# Patient Record
Sex: FEMALE | Race: WHITE | Employment: OTHER | URBAN - METROPOLITAN AREA
[De-identification: names, ages, dates, MRNs, and addresses within clinical notes are randomized per-mention and may not be internally consistent; named-entity substitution may affect disease eponyms.]

---

## 2020-02-24 NOTE — PROGRESS NOTES
Called pt very [place] asked me to call her daughter, Jenna Jett [phone]  To answer medical info. Left message for Patient to call pat department for interview.

## 2020-02-25 ENCOUNTER — ANESTHESIA EVENT (OUTPATIENT)
Dept: ENDOSCOPY | Age: 80
End: 2020-02-25
Payer: MEDICARE

## 2020-02-25 RX ORDER — OXYCODONE HYDROCHLORIDE 30 MG/1
30 TABLET ORAL EVERY 4 HOURS PRN
COMMUNITY

## 2020-02-25 RX ORDER — CLOTRIMAZOLE 1 %
CREAM (GRAM) TOPICAL 2 TIMES DAILY
COMMUNITY

## 2020-02-25 RX ORDER — FLUOXETINE HYDROCHLORIDE 20 MG/1
20 CAPSULE ORAL DAILY
COMMUNITY

## 2020-02-25 RX ORDER — ALPRAZOLAM 0.25 MG/1
0.25 TABLET ORAL 2 TIMES DAILY PRN
COMMUNITY

## 2020-02-25 RX ORDER — ALBUTEROL SULFATE 90 UG/1
2 AEROSOL, METERED RESPIRATORY (INHALATION) EVERY 4 HOURS PRN
COMMUNITY

## 2020-02-25 RX ORDER — FLUTICASONE PROPIONATE 50 MCG
2 SPRAY, SUSPENSION (ML) NASAL DAILY
COMMUNITY

## 2020-02-25 SDOH — HEALTH STABILITY: MENTAL HEALTH: HOW OFTEN DO YOU HAVE A DRINK CONTAINING ALCOHOL?: NEVER

## 2020-02-25 NOTE — PROGRESS NOTES
[address]  time_1115 per Rashid rasmussen___________        Surgery time____1245________    Take the following medications with a sip of water: Follow your MD/Surgeons pre-procedure instructions regarding your medications    Do not eat or drink anything after 12:00 midnight prior to your surgery. This includes water chewing gum, mints and ice chips. You may brush your teeth and gargle the morning of your surgery, but do not swallow the water     Please see your family doctor/pediatrician for a history and physical and/or concerning medications. Bring any test results/reports from your physicians office. If you are under the care of a heart doctor or specialist doctor, please be aware that you may be asked to them for clearance    You may be asked to stop blood thinners such as Coumadin, Plavix, Fragmin, Lovenox, etc., or any anti-inflammatories such as:  Aspirin, Ibuprofen, Advil, Naproxen prior to your surgery. We also ask that you stop any OTC medications such as fish oil, vitamin E, glucosamine, garlic, Multivitamins, COQ 10, etc.    We ask that you do not smoke 24 hours prior to surgery  We ask that you do not  drink any alcoholic beverages 24 hours prior to surgery     You must make arrangements for a responsible adult to take you home after your surgery. For your safety you will not be allowed to leave alone or drive yourself home. Your surgery will be cancelled if you do not have a ride home. Also for your safety, it is strongly suggested that someone stay with you the first 24 hours after your surgery. A parent or legal guardian must accompany a child scheduled for surgery and plan to stay at the hospital until the child is discharged. Please do not bring other children with you. For your comfort, please wear simple loose fitting clothing to the hospital.  Please do not bring valuables.     Do not wear any make-up or nail polish

## 2020-02-25 NOTE — PROGRESS NOTES
Phone message left with Yadira Zamorano as requested per patient as noted from yesterday, to call PAT dept at [phone]  for history review and surgery instructions.

## 2020-02-25 NOTE — PROGRESS NOTES
C-Difficile admission screening and protocol:     * Admitted with diarrhea? no     *Prior history of C-Diff. In last 3 months? no     *Antibiotic use in the past 6-8 weeks? no     *Prior hospitalization or nursing home in the last month?  no

## 2020-02-26 ENCOUNTER — HOSPITAL ENCOUNTER (OUTPATIENT)
Age: 80
Setting detail: OUTPATIENT SURGERY
Discharge: HOME | End: 2020-02-26
Attending: INTERNAL MEDICINE | Admitting: INTERNAL MEDICINE
Payer: MEDICARE

## 2020-02-26 ENCOUNTER — ANESTHESIA (OUTPATIENT)
Dept: ENDOSCOPY | Age: 80
End: 2020-02-26
Payer: MEDICARE

## 2020-02-26 VITALS
SYSTOLIC BLOOD PRESSURE: 126 MMHG | BODY MASS INDEX: 21.35 KG/M2 | OXYGEN SATURATION: 98 % | HEIGHT: 62 IN | TEMPERATURE: 97.1 F | RESPIRATION RATE: 14 BRPM | DIASTOLIC BLOOD PRESSURE: 74 MMHG | WEIGHT: 116 LBS | HEART RATE: 62 BPM

## 2020-02-26 VITALS
OXYGEN SATURATION: 99 % | SYSTOLIC BLOOD PRESSURE: 165 MMHG | RESPIRATION RATE: 24 BRPM | DIASTOLIC BLOOD PRESSURE: 74 MMHG

## 2020-02-26 LAB
GLUCOSE BLD-MCNC: 89 MG/DL (ref 70–99)
PERFORMED ON: NORMAL

## 2020-02-26 PROCEDURE — 2580000003 HC RX 258: Performed by: ANESTHESIOLOGY

## 2020-02-26 PROCEDURE — 7100000010 HC PHASE II RECOVERY - FIRST 15 MIN: Performed by: INTERNAL MEDICINE

## 2020-02-26 PROCEDURE — 2500000003 HC RX 250 WO HCPCS: Performed by: NURSE ANESTHETIST, CERTIFIED REGISTERED

## 2020-02-26 PROCEDURE — 3700000001 HC ADD 15 MINUTES (ANESTHESIA): Performed by: INTERNAL MEDICINE

## 2020-02-26 PROCEDURE — 3700000000 HC ANESTHESIA ATTENDED CARE: Performed by: INTERNAL MEDICINE

## 2020-02-26 PROCEDURE — 3609012400 HC EGD TRANSORAL BIOPSY SINGLE/MULTIPLE: Performed by: INTERNAL MEDICINE

## 2020-02-26 PROCEDURE — 7100000000 HC PACU RECOVERY - FIRST 15 MIN: Performed by: INTERNAL MEDICINE

## 2020-02-26 PROCEDURE — 3609018500 HC EGD US SCOPE W/ADJACENT STRUCTURES: Performed by: INTERNAL MEDICINE

## 2020-02-26 PROCEDURE — 2709999900 HC NON-CHARGEABLE SUPPLY: Performed by: INTERNAL MEDICINE

## 2020-02-26 PROCEDURE — 7100000011 HC PHASE II RECOVERY - ADDTL 15 MIN: Performed by: INTERNAL MEDICINE

## 2020-02-26 PROCEDURE — 88305 TISSUE EXAM BY PATHOLOGIST: CPT

## 2020-02-26 PROCEDURE — 7100000001 HC PACU RECOVERY - ADDTL 15 MIN: Performed by: INTERNAL MEDICINE

## 2020-02-26 PROCEDURE — 6360000002 HC RX W HCPCS: Performed by: NURSE ANESTHETIST, CERTIFIED REGISTERED

## 2020-02-26 RX ORDER — PROPOFOL 10 MG/ML
INJECTION, EMULSION INTRAVENOUS PRN
Status: DISCONTINUED | OUTPATIENT
Start: 2020-02-26 | End: 2020-02-26 | Stop reason: SDUPTHER

## 2020-02-26 RX ORDER — LIDOCAINE HYDROCHLORIDE 20 MG/ML
INJECTION, SOLUTION EPIDURAL; INFILTRATION; INTRACAUDAL; PERINEURAL PRN
Status: DISCONTINUED | OUTPATIENT
Start: 2020-02-26 | End: 2020-02-26 | Stop reason: SDUPTHER

## 2020-02-26 RX ORDER — SODIUM CHLORIDE 0.9 % (FLUSH) 0.9 %
10 SYRINGE (ML) INJECTION PRN
Status: DISCONTINUED | OUTPATIENT
Start: 2020-02-26 | End: 2020-02-26 | Stop reason: HOSPADM

## 2020-02-26 RX ORDER — SODIUM CHLORIDE 9 MG/ML
INJECTION, SOLUTION INTRAVENOUS CONTINUOUS
Status: DISCONTINUED | OUTPATIENT
Start: 2020-02-26 | End: 2020-02-26 | Stop reason: HOSPADM

## 2020-02-26 RX ORDER — SODIUM CHLORIDE 0.9 % (FLUSH) 0.9 %
10 SYRINGE (ML) INJECTION EVERY 12 HOURS SCHEDULED
Status: DISCONTINUED | OUTPATIENT
Start: 2020-02-26 | End: 2020-02-26 | Stop reason: HOSPADM

## 2020-02-26 RX ORDER — PROPOFOL 10 MG/ML
INJECTION, EMULSION INTRAVENOUS CONTINUOUS PRN
Status: DISCONTINUED | OUTPATIENT
Start: 2020-02-26 | End: 2020-02-26 | Stop reason: SDUPTHER

## 2020-02-26 RX ADMIN — PROPOFOL 70 MG: 10 INJECTION, EMULSION INTRAVENOUS at 12:39

## 2020-02-26 RX ADMIN — PROPOFOL 140 MCG/KG/MIN: 10 INJECTION, EMULSION INTRAVENOUS at 12:39

## 2020-02-26 RX ADMIN — LIDOCAINE HYDROCHLORIDE 80 MG: 20 INJECTION, SOLUTION EPIDURAL; INFILTRATION; INTRACAUDAL; PERINEURAL at 12:39

## 2020-02-26 RX ADMIN — SODIUM CHLORIDE: 9 INJECTION, SOLUTION INTRAVENOUS at 12:24

## 2020-02-26 ASSESSMENT — PAIN - FUNCTIONAL ASSESSMENT: PAIN_FUNCTIONAL_ASSESSMENT: 0-10

## 2020-02-26 ASSESSMENT — PULMONARY FUNCTION TESTS
PIF_VALUE: 0

## 2020-02-26 ASSESSMENT — PAIN SCALES - GENERAL
PAINLEVEL_OUTOF10: 0

## 2020-02-26 NOTE — OP NOTE
Endosonographically, the major papilla appeared normal  Pancreas: The pancreatic duct measured 1.7mm off the major papilla, 2.2mm in the head of the pancreas, 3.0mm in the body of the pancreas, and 1.1mm in the tail of the pancreas. The pancreatic parenchyma was normal without mass lesion or chronic pancreatitis. Bile duct: The bile duct was 5.3mm off the major papilla and 11.3mm in the mid bile duct. There were no shadowing stones or strictures. Gallbladder: The gallbladder was surgically absent. L adrenal: Normal.  Doppler evaluation of the celiac artery, splenic artery, hepatic artery, superior mesenteric artery, superior mesenteric vein, portal vein, and splenic vein was normal.    The duodenum and stomach were decompressed and the scope was withdrawn from the patient. The patient tolerated the procedure well and was taken to the post anesthesia care unit in good condition. Doppler Interpretation:  Doppler evaluation was performed and interpreted on the spot by the endoscopist without the presence of a radiologist.      Specimens taken: yes  Estimated blood loss: minimal      Impression:  1. 3cm sliding hiatal hernia  2. Otherwise normal EGD. Small bowel biopsies were obtained. 3. Mild biliary and pancreatic ductal dilation likely from extrinsic compression from the duodenal diverticulum. No stricture, stones, mass lesions identified. No chronic pancreatitis. Recommendations:  1. Clear liquid diet advance as tolerated. 2.  Office follow-up with Dr. Diamante Alberto and Dr. Wang Butcher  3. I took small bowel biopsies today to evaluate for the cause of weight loss. If these are negative, I did not find a cause today.     [place]

## 2020-02-26 NOTE — ANESTHESIA PRE PROCEDURE
tablet Take 30 mg by mouth every 4 hours as needed for Pain.  albuterol sulfate  (90 Base) MCG/ACT inhaler Inhale 2 puffs into the lungs every 4 hours as needed for Wheezing      ALPRAZolam (XANAX) 0.25 MG tablet Take 0.25 mg by mouth 2 times daily as needed for Sleep (to start March 4 , 2020). Current Facility-Administered Medications   Medication Dose Route Frequency Provider Last Rate Last Dose    0.9 % sodium chloride infusion   Intravenous Continuous Sushila Camarena MD        sodium chloride flush 0.9 % injection 10 mL  10 mL Intravenous 2 times per day Sushila Camarena MD        sodium chloride flush 0.9 % injection 10 mL  10 mL Intravenous PRN Sushila Camarena MD         Vital Signs (Current)   Vitals:    02/25/20 1534   Weight: 116 lb (52.6 kg)   Height: 5' 2\" (1.575 m)                                          BP Readings from Last 3 Encounters:   11/01/12 129/62     Vital Signs Statistics (for past 48 hrs)     No data recorded  BP Readings from Last 3 Encounters:   11/01/12 129/62       BMI  Body mass index is 21.22 kg/m². Estimated body mass index is 21.22 kg/m² as calculated from the following:    Height as of this encounter: 5' 2\" (1.575 m). Weight as of this encounter: 116 lb (52.6 kg). CBC No results found for: WBC, RBC, HGB, HCT, MCV, RDW, PLT  CMP  No results found for: NA, K, CL, CO2, BUN, CREATININE, GFRAA, AGRATIO, LABGLOM, GLUCOSE, PROT, CALCIUM, BILITOT, ALKPHOS, AST, ALT  BMP  No results found for: NA, K, CL, CO2, BUN, CREATININE, CALCIUM, GFRAA, LABGLOM, GLUCOSE  POCGlucose  No results for input(s): GLUCOSE in the last 72 hours.    Coags  No results found for: PROTIME, INR, APTT  HCG (If Applicable) No results found for: PREGTESTUR, PREGSERUM, HCG, HCGQUANT   ABGs No results found for: PHART, PO2ART, SUO7SXH, UGX1ACY, BEART, X3OKDTDE   Type & Screen (If Applicable)  No results found for: LABABO, LABRH                         BMI: Wt Readings from Last 3 Encounters: NPO Status:  >8h                          Anesthesia Evaluation  Patient summary reviewed no history of anesthetic complications:   Airway: Mallampati: II  TM distance: >3 FB   Neck ROM: full  Mouth opening: > = 3 FB Dental: normal exam         Pulmonary: breath sounds clear to auscultation  (+) COPD:      (-) asthma and sleep apnea                           Cardiovascular:  Exercise tolerance: good (>4 METS),   (+) hypertension:, hyperlipidemia    (-) past MI, CABG/stent and  angina      Rhythm: regular  Rate: normal                    Neuro/Psych:      (-) seizures, TIA and CVA           GI/Hepatic/Renal:        (-) GERD, liver disease, no renal disease and no morbid obesity       Endo/Other:        (-) hypothyroidism               Abdominal:           Vascular:     - DVT and PE. Anesthesia Plan      MAC     ASA 3       Induction: intravenous. Anesthetic plan and risks discussed with patient. Plan discussed with CRNA. This pre-anesthesia assessment may be used as a history and physical.    DOS STAFF ADDENDUM:    Pt seen and examined, chart reviewed (including anesthesia, drug and allergy history). No interval changes to history and physical examination. Anesthetic plan, risks, benefits, alternatives, and personnel involved discussed with patient. Patient verbalized an understanding and agrees to proceed.       Veronica Lubin MD  February 26, 2020  11:05 AM

## 2020-02-26 NOTE — H&P
status:      Spouse name: Not on file    Number of children: Not on file    Years of education: Not on file    Highest education level: Not on file   Occupational History    Not on file   Social Needs    Financial resource strain: Not on file    Food insecurity:     Worry: Not on file     Inability: Not on file    Transportation needs:     Medical: Not on file     Non-medical: Not on file   Tobacco Use    Smoking status: Current Every Day Smoker     Years: 30.00     Types: Cigarettes    Smokeless tobacco: Never Used    Tobacco comment: 5 cigarettes daily   Substance and Sexual Activity    Alcohol use: Never     Frequency: Never    Drug use: Never    Sexual activity: Not Currently   Lifestyle    Physical activity:     Days per week: Not on file     Minutes per session: Not on file    Stress: Not on file   Relationships    Social connections:     Talks on phone: Not on file     Gets together: Not on file     Attends Sabianism service: Not on file     Active member of club or organization: Not on file     Attends meetings of clubs or organizations: Not on file     Relationship status: Not on file    Intimate partner violence:     Fear of current or ex partner: Not on file     Emotionally abused: Not on file     Physically abused: Not on file     Forced sexual activity: Not on file   Other Topics Concern    Not on file   Social History Narrative    Not on file      Family History:       Problem Relation Age of Onset    Heart Disease Father         PHYSICAL EXAM:      /62   Pulse 72   Temp 98 °F (36.7 °C) (Temporal)   Resp 16   Ht 5' 2\" (1.575 m)   Wt 116 lb (52.6 kg)   SpO2 98%   BMI 21.22 kg/m²  I        Heart:  RRR    Lungs:  CTA b    Abdomen:  S/NT/ND/+BS      ASSESSMENT AND PLAN:  ASA: per anesthesia  Mallampati: per anesthesia  1. Patient is a 78 y.o. female here for EUS and EGD   2. Procedure options, risks and benefits reviewed with the patient.   The patient expresses

## 2024-01-28 ENCOUNTER — APPOINTMENT (OUTPATIENT)
Dept: GENERAL RADIOLOGY | Age: 84
DRG: 871 | End: 2024-01-28
Payer: MEDICARE

## 2024-01-28 ENCOUNTER — APPOINTMENT (OUTPATIENT)
Dept: CT IMAGING | Age: 84
DRG: 871 | End: 2024-01-28
Payer: MEDICARE

## 2024-01-28 ENCOUNTER — HOSPITAL ENCOUNTER (INPATIENT)
Age: 84
LOS: 11 days | Discharge: NURSING, REHAB OR LONG-TERM CARE | DRG: 871 | End: 2024-02-08
Attending: EMERGENCY MEDICINE | Admitting: INTERNAL MEDICINE
Payer: MEDICARE

## 2024-01-28 DIAGNOSIS — A41.9 SEPTICEMIA (HCC): Primary | ICD-10-CM

## 2024-01-28 DIAGNOSIS — J18.9 PNEUMONIA OF BOTH LOWER LOBES DUE TO INFECTIOUS ORGANISM: ICD-10-CM

## 2024-01-28 DIAGNOSIS — J10.1 INFLUENZA A: ICD-10-CM

## 2024-01-28 DIAGNOSIS — I48.91 ATRIAL FIBRILLATION WITH RVR (HCC): ICD-10-CM

## 2024-01-28 DIAGNOSIS — N17.9 ACUTE KIDNEY INJURY (HCC): ICD-10-CM

## 2024-01-28 DIAGNOSIS — J96.01 ACUTE HYPOXIC RESPIRATORY FAILURE (HCC): ICD-10-CM

## 2024-01-28 PROBLEM — R65.20 SEVERE SEPSIS (HCC): Status: ACTIVE | Noted: 2024-01-28

## 2024-01-28 LAB
ABO + RH BLD: NORMAL
ALBUMIN SERPL-MCNC: 2.3 G/DL (ref 3.4–5)
ALBUMIN SERPL-MCNC: 3.2 G/DL (ref 3.4–5)
ALBUMIN/GLOB SERPL: 0.8 {RATIO} (ref 1.1–2.2)
ALP SERPL-CCNC: 36 U/L (ref 40–129)
ALP SERPL-CCNC: 48 U/L (ref 40–129)
ALT SERPL-CCNC: 30 U/L (ref 10–40)
ALT SERPL-CCNC: 36 U/L (ref 10–40)
ANION GAP SERPL CALCULATED.3IONS-SCNC: 16 MMOL/L (ref 3–16)
ANION GAP SERPL CALCULATED.3IONS-SCNC: 20 MMOL/L (ref 3–16)
ANTI-XA UNFRAC HEPARIN: <0.1 IU/ML (ref 0.3–0.7)
APTT BLD: 29.2 SEC (ref 22.7–35.9)
APTT BLD: 39.7 SEC (ref 22.7–35.9)
AST SERPL-CCNC: 52 U/L (ref 15–37)
AST SERPL-CCNC: 54 U/L (ref 15–37)
BACTERIA URNS QL MICRO: ABNORMAL /HPF
BASE EXCESS BLDV CALC-SCNC: 1.7 MMOL/L (ref -2–3)
BASOPHILS # BLD: 0 K/UL (ref 0–0.2)
BASOPHILS NFR BLD: 0 %
BILIRUB DIRECT SERPL-MCNC: 0.6 MG/DL (ref 0–0.3)
BILIRUB DIRECT SERPL-MCNC: 0.6 MG/DL (ref 0–0.3)
BILIRUB INDIRECT SERPL-MCNC: 0.3 MG/DL (ref 0–1)
BILIRUB INDIRECT SERPL-MCNC: 0.4 MG/DL (ref 0–1)
BILIRUB SERPL-MCNC: 0.9 MG/DL (ref 0–1)
BILIRUB SERPL-MCNC: 1 MG/DL (ref 0–1)
BILIRUB UR QL STRIP.AUTO: ABNORMAL
BLD GP AB SCN SERPL QL: NORMAL
BUN SERPL-MCNC: 87 MG/DL (ref 7–20)
BUN SERPL-MCNC: 95 MG/DL (ref 7–20)
CALCIUM SERPL-MCNC: 10.3 MG/DL (ref 8.3–10.6)
CALCIUM SERPL-MCNC: 9.3 MG/DL (ref 8.3–10.6)
CHLORIDE SERPL-SCNC: 100 MMOL/L (ref 99–110)
CHLORIDE SERPL-SCNC: 104 MMOL/L (ref 99–110)
CLARITY UR: CLEAR
CO2 BLDV-SCNC: 29 MMOL/L
CO2 SERPL-SCNC: 21 MMOL/L (ref 21–32)
CO2 SERPL-SCNC: 23 MMOL/L (ref 21–32)
COHGB MFR BLDV: 1.1 % (ref 0–1.5)
COLOR UR: YELLOW
CREAT SERPL-MCNC: 1.4 MG/DL (ref 0.6–1.2)
CREAT SERPL-MCNC: 2 MG/DL (ref 0.6–1.2)
DEPRECATED RDW RBC AUTO: 13.6 % (ref 12.4–15.4)
DEPRECATED RDW RBC AUTO: 13.9 % (ref 12.4–15.4)
DO-HGB MFR BLDV: 74.9 %
EKG DIAGNOSIS: NORMAL
EKG Q-T INTERVAL: 238 MS
EKG QRS DURATION: 70 MS
EKG QTC CALCULATION (BAZETT): 406 MS
EKG R AXIS: 29 DEGREES
EKG T AXIS: 213 DEGREES
EKG VENTRICULAR RATE: 175 BPM
EOSINOPHIL # BLD: 0 K/UL (ref 0–0.6)
EOSINOPHIL NFR BLD: 0 %
FINE GRAN CASTS #/AREA URNS HPF: ABNORMAL /LPF (ref 0–2)
FLUAV RNA RESP QL NAA+PROBE: DETECTED
FLUBV RNA RESP QL NAA+PROBE: NOT DETECTED
GFR SERPLBLD CREATININE-BSD FMLA CKD-EPI: 24 ML/MIN/{1.73_M2}
GFR SERPLBLD CREATININE-BSD FMLA CKD-EPI: 37 ML/MIN/{1.73_M2}
GLUCOSE SERPL-MCNC: 102 MG/DL (ref 70–99)
GLUCOSE SERPL-MCNC: 119 MG/DL (ref 70–99)
GLUCOSE UR STRIP.AUTO-MCNC: NEGATIVE MG/DL
HCO3 BLDV-SCNC: 27.5 MMOL/L (ref 24–28)
HCT VFR BLD AUTO: 38.6 % (ref 36–48)
HCT VFR BLD AUTO: 41.9 % (ref 36–48)
HCT VFR BLD AUTO: 49.9 % (ref 36–48)
HEMOCCULT STL QL: ABNORMAL
HGB BLD-MCNC: 13.5 G/DL (ref 12–16)
HGB BLD-MCNC: 13.9 G/DL (ref 12–16)
HGB BLD-MCNC: 16.8 G/DL (ref 12–16)
HGB UR QL STRIP.AUTO: ABNORMAL
HYALINE CASTS #/AREA URNS LPF: ABNORMAL /LPF (ref 0–2)
INR PPP: 1.04 (ref 0.84–1.16)
INR PPP: 1.22 (ref 0.84–1.16)
KETONES UR STRIP.AUTO-MCNC: NEGATIVE MG/DL
LACTATE BLDV-SCNC: 12.7 MMOL/L (ref 0.4–1.9)
LACTATE BLDV-SCNC: 2.2 MMOL/L (ref 0.4–2)
LACTATE BLDV-SCNC: 2.4 MMOL/L (ref 0.4–2)
LACTATE BLDV-SCNC: 2.7 MMOL/L (ref 0.4–2)
LACTATE BLDV-SCNC: 3.1 MMOL/L (ref 0.4–2)
LACTATE BLDV-SCNC: 3.4 MMOL/L (ref 0.4–1.9)
LEUKOCYTE ESTERASE UR QL STRIP.AUTO: NEGATIVE
LYMPHOCYTES # BLD: 0.7 K/UL (ref 1–5.1)
LYMPHOCYTES NFR BLD: 3 %
MAGNESIUM SERPL-MCNC: 2.4 MG/DL (ref 1.8–2.4)
MCH RBC QN AUTO: 31.7 PG (ref 26–34)
MCH RBC QN AUTO: 31.9 PG (ref 26–34)
MCHC RBC AUTO-ENTMCNC: 33.1 G/DL (ref 31–36)
MCHC RBC AUTO-ENTMCNC: 33.8 G/DL (ref 31–36)
MCV RBC AUTO: 94.5 FL (ref 80–100)
MCV RBC AUTO: 95.7 FL (ref 80–100)
METAMYELOCYTES NFR BLD MANUAL: 2 %
METHGB MFR BLDV: 0.1 % (ref 0–1.5)
MONOCYTES # BLD: 0.3 K/UL (ref 0–1.3)
MONOCYTES NFR BLD: 3 %
NEUTROPHILS # BLD: 8.6 K/UL (ref 1.7–7.7)
NEUTROPHILS NFR BLD: 83 %
NEUTS BAND NFR BLD MANUAL: 5 % (ref 0–7)
NITRITE UR QL STRIP.AUTO: NEGATIVE
PATH INTERP BLD-IMP: NO
PCO2 BLDV: 45.8 MMHG (ref 41–51)
PH BLDV: 7.39 [PH] (ref 7.35–7.45)
PH UR STRIP.AUTO: 5.5 [PH] (ref 5–8)
PHOSPHATE SERPL-MCNC: 2.9 MG/DL (ref 2.5–4.9)
PLATELET # BLD AUTO: 113 K/UL (ref 135–450)
PLATELET # BLD AUTO: 175 K/UL (ref 135–450)
PLATELET BLD QL SMEAR: ADEQUATE
PMV BLD AUTO: 9.4 FL (ref 5–10.5)
PMV BLD AUTO: 9.7 FL (ref 5–10.5)
PO2 BLDV: <30 MMHG (ref 25–40)
POTASSIUM SERPL-SCNC: 3.3 MMOL/L (ref 3.5–5.1)
POTASSIUM SERPL-SCNC: 3.6 MMOL/L (ref 3.5–5.1)
PROCALCITONIN SERPL IA-MCNC: 8.64 NG/ML (ref 0–0.15)
PROT SERPL-MCNC: 6 G/DL (ref 6.4–8.2)
PROT SERPL-MCNC: 7.4 G/DL (ref 6.4–8.2)
PROT UR STRIP.AUTO-MCNC: 100 MG/DL
PROTHROMBIN TIME: 13.6 SEC (ref 11.5–14.8)
PROTHROMBIN TIME: 15.4 SEC (ref 11.5–14.8)
RBC # BLD AUTO: 4.38 M/UL (ref 4–5.2)
RBC # BLD AUTO: 5.27 M/UL (ref 4–5.2)
RBC #/AREA URNS HPF: ABNORMAL /HPF (ref 0–4)
RBC MORPH BLD: NORMAL
REASON FOR REJECTION: NORMAL
REJECTED TEST: NORMAL
SAO2 % BLDV: 24 %
SARS-COV-2 RNA RESP QL NAA+PROBE: NOT DETECTED
SLIDE REVIEW: ABNORMAL
SODIUM SERPL-SCNC: 141 MMOL/L (ref 136–145)
SODIUM SERPL-SCNC: 143 MMOL/L (ref 136–145)
SP GR UR STRIP.AUTO: >=1.03 (ref 1–1.03)
T4 FREE SERPL-MCNC: 1.2 NG/DL (ref 0.9–1.8)
TROPONIN, HIGH SENSITIVITY: 31 NG/L (ref 0–14)
TROPONIN, HIGH SENSITIVITY: 47 NG/L (ref 0–14)
TROPONIN, HIGH SENSITIVITY: 52 NG/L (ref 0–14)
TROPONIN, HIGH SENSITIVITY: 70 NG/L (ref 0–14)
TSH SERPL DL<=0.005 MIU/L-ACNC: 0.26 UIU/ML (ref 0.27–4.2)
UA COMPLETE W REFLEX CULTURE PNL UR: ABNORMAL
UA DIPSTICK W REFLEX MICRO PNL UR: YES
URN SPEC COLLECT METH UR: ABNORMAL
UROBILINOGEN UR STRIP-ACNC: 0.2 E.U./DL
VARIANT LYMPHS NFR BLD MANUAL: 4 % (ref 0–6)
WBC # BLD AUTO: 7.1 K/UL (ref 4–11)
WBC # BLD AUTO: 9.6 K/UL (ref 4–11)
WBC #/AREA URNS HPF: ABNORMAL /HPF (ref 0–5)
YEAST URNS QL MICRO: PRESENT /HPF

## 2024-01-28 PROCEDURE — 36415 COLL VENOUS BLD VENIPUNCTURE: CPT

## 2024-01-28 PROCEDURE — 71045 X-RAY EXAM CHEST 1 VIEW: CPT

## 2024-01-28 PROCEDURE — 6360000002 HC RX W HCPCS

## 2024-01-28 PROCEDURE — 82270 OCCULT BLOOD FECES: CPT

## 2024-01-28 PROCEDURE — 94761 N-INVAS EAR/PLS OXIMETRY MLT: CPT

## 2024-01-28 PROCEDURE — 2700000000 HC OXYGEN THERAPY PER DAY

## 2024-01-28 PROCEDURE — 96367 TX/PROPH/DG ADDL SEQ IV INF: CPT

## 2024-01-28 PROCEDURE — 2580000003 HC RX 258

## 2024-01-28 PROCEDURE — 84439 ASSAY OF FREE THYROXINE: CPT

## 2024-01-28 PROCEDURE — 96366 THER/PROPH/DIAG IV INF ADDON: CPT

## 2024-01-28 PROCEDURE — 85730 THROMBOPLASTIN TIME PARTIAL: CPT

## 2024-01-28 PROCEDURE — 83605 ASSAY OF LACTIC ACID: CPT

## 2024-01-28 PROCEDURE — 85520 HEPARIN ASSAY: CPT

## 2024-01-28 PROCEDURE — 6360000002 HC RX W HCPCS: Performed by: EMERGENCY MEDICINE

## 2024-01-28 PROCEDURE — 2500000003 HC RX 250 WO HCPCS

## 2024-01-28 PROCEDURE — 87150 DNA/RNA AMPLIFIED PROBE: CPT

## 2024-01-28 PROCEDURE — 84145 PROCALCITONIN (PCT): CPT

## 2024-01-28 PROCEDURE — 99285 EMERGENCY DEPT VISIT HI MDM: CPT

## 2024-01-28 PROCEDURE — 85018 HEMOGLOBIN: CPT

## 2024-01-28 PROCEDURE — 93005 ELECTROCARDIOGRAM TRACING: CPT

## 2024-01-28 PROCEDURE — 96365 THER/PROPH/DIAG IV INF INIT: CPT

## 2024-01-28 PROCEDURE — 6360000004 HC RX CONTRAST MEDICATION

## 2024-01-28 PROCEDURE — 85610 PROTHROMBIN TIME: CPT

## 2024-01-28 PROCEDURE — 87040 BLOOD CULTURE FOR BACTERIA: CPT

## 2024-01-28 PROCEDURE — 84443 ASSAY THYROID STIM HORMONE: CPT

## 2024-01-28 PROCEDURE — 86850 RBC ANTIBODY SCREEN: CPT

## 2024-01-28 PROCEDURE — 87449 NOS EACH ORGANISM AG IA: CPT

## 2024-01-28 PROCEDURE — 6360000002 HC RX W HCPCS: Performed by: INTERNAL MEDICINE

## 2024-01-28 PROCEDURE — 2580000003 HC RX 258: Performed by: EMERGENCY MEDICINE

## 2024-01-28 PROCEDURE — 85025 COMPLETE CBC W/AUTO DIFF WBC: CPT

## 2024-01-28 PROCEDURE — 81001 URINALYSIS AUTO W/SCOPE: CPT

## 2024-01-28 PROCEDURE — 87636 SARSCOV2 & INF A&B AMP PRB: CPT

## 2024-01-28 PROCEDURE — 85014 HEMATOCRIT: CPT

## 2024-01-28 PROCEDURE — 80053 COMPREHEN METABOLIC PANEL: CPT

## 2024-01-28 PROCEDURE — 99291 CRITICAL CARE FIRST HOUR: CPT | Performed by: INTERNAL MEDICINE

## 2024-01-28 PROCEDURE — 86901 BLOOD TYPING SEROLOGIC RH(D): CPT

## 2024-01-28 PROCEDURE — 83735 ASSAY OF MAGNESIUM: CPT

## 2024-01-28 PROCEDURE — 84484 ASSAY OF TROPONIN QUANT: CPT

## 2024-01-28 PROCEDURE — 6370000000 HC RX 637 (ALT 250 FOR IP)

## 2024-01-28 PROCEDURE — 93005 ELECTROCARDIOGRAM TRACING: CPT | Performed by: INTERNAL MEDICINE

## 2024-01-28 PROCEDURE — 74178 CT ABD&PLV WO CNTR FLWD CNTR: CPT

## 2024-01-28 PROCEDURE — 71260 CT THORAX DX C+: CPT

## 2024-01-28 PROCEDURE — 82803 BLOOD GASES ANY COMBINATION: CPT

## 2024-01-28 PROCEDURE — 86900 BLOOD TYPING SEROLOGIC ABO: CPT

## 2024-01-28 PROCEDURE — 2060000000 HC ICU INTERMEDIATE R&B

## 2024-01-28 PROCEDURE — C9113 INJ PANTOPRAZOLE SODIUM, VIA: HCPCS

## 2024-01-28 PROCEDURE — 85027 COMPLETE CBC AUTOMATED: CPT

## 2024-01-28 PROCEDURE — 70450 CT HEAD/BRAIN W/O DYE: CPT

## 2024-01-28 RX ORDER — ACETAMINOPHEN 650 MG/1
650 SUPPOSITORY RECTAL ONCE
Status: COMPLETED | OUTPATIENT
Start: 2024-01-28 | End: 2024-01-28

## 2024-01-28 RX ORDER — HEPARIN SODIUM 1000 [USP'U]/ML
30 INJECTION, SOLUTION INTRAVENOUS; SUBCUTANEOUS PRN
Status: DISCONTINUED | OUTPATIENT
Start: 2024-01-28 | End: 2024-02-02 | Stop reason: ALTCHOICE

## 2024-01-28 RX ORDER — SODIUM CHLORIDE, SODIUM LACTATE, POTASSIUM CHLORIDE, AND CALCIUM CHLORIDE .6; .31; .03; .02 G/100ML; G/100ML; G/100ML; G/100ML
500 INJECTION, SOLUTION INTRAVENOUS ONCE
Status: COMPLETED | OUTPATIENT
Start: 2024-01-28 | End: 2024-01-28

## 2024-01-28 RX ORDER — LEVALBUTEROL INHALATION SOLUTION 0.63 MG/3ML
0.63 SOLUTION RESPIRATORY (INHALATION) EVERY 8 HOURS PRN
Status: DISCONTINUED | OUTPATIENT
Start: 2024-01-28 | End: 2024-01-28

## 2024-01-28 RX ORDER — HEPARIN SODIUM 10000 [USP'U]/100ML
5-35 INJECTION, SOLUTION INTRAVENOUS CONTINUOUS
Status: DISCONTINUED | OUTPATIENT
Start: 2024-01-28 | End: 2024-02-02

## 2024-01-28 RX ORDER — POLYETHYLENE GLYCOL 3350 17 G/17G
17 POWDER, FOR SOLUTION ORAL DAILY PRN
Status: DISCONTINUED | OUTPATIENT
Start: 2024-01-28 | End: 2024-02-08 | Stop reason: HOSPADM

## 2024-01-28 RX ORDER — HEPARIN SODIUM 1000 [USP'U]/ML
60 INJECTION, SOLUTION INTRAVENOUS; SUBCUTANEOUS PRN
Status: DISCONTINUED | OUTPATIENT
Start: 2024-01-28 | End: 2024-02-02 | Stop reason: ALTCHOICE

## 2024-01-28 RX ORDER — POTASSIUM CHLORIDE 7.45 MG/ML
10 INJECTION INTRAVENOUS
Status: DISPENSED | OUTPATIENT
Start: 2024-01-28 | End: 2024-01-28

## 2024-01-28 RX ORDER — SODIUM CHLORIDE 0.9 % (FLUSH) 0.9 %
5-40 SYRINGE (ML) INJECTION EVERY 12 HOURS SCHEDULED
Status: DISCONTINUED | OUTPATIENT
Start: 2024-01-28 | End: 2024-02-08 | Stop reason: HOSPADM

## 2024-01-28 RX ORDER — BUDESONIDE 0.25 MG/2ML
0.25 INHALANT ORAL
Status: DISCONTINUED | OUTPATIENT
Start: 2024-01-28 | End: 2024-02-08 | Stop reason: HOSPADM

## 2024-01-28 RX ORDER — IPRATROPIUM BROMIDE AND ALBUTEROL SULFATE 2.5; .5 MG/3ML; MG/3ML
1 SOLUTION RESPIRATORY (INHALATION)
Status: DISCONTINUED | OUTPATIENT
Start: 2024-01-28 | End: 2024-01-28

## 2024-01-28 RX ORDER — ALPRAZOLAM 0.25 MG/1
0.25 TABLET ORAL 2 TIMES DAILY PRN
Status: DISCONTINUED | OUTPATIENT
Start: 2024-01-28 | End: 2024-02-08 | Stop reason: HOSPADM

## 2024-01-28 RX ORDER — ONDANSETRON 4 MG/1
4 TABLET, ORALLY DISINTEGRATING ORAL EVERY 8 HOURS PRN
Status: DISCONTINUED | OUTPATIENT
Start: 2024-01-28 | End: 2024-02-08 | Stop reason: HOSPADM

## 2024-01-28 RX ORDER — ACETAMINOPHEN 500 MG
1000 TABLET ORAL ONCE
Status: DISCONTINUED | OUTPATIENT
Start: 2024-01-28 | End: 2024-01-28

## 2024-01-28 RX ORDER — DIGOXIN 0.25 MG/ML
500 INJECTION INTRAMUSCULAR; INTRAVENOUS ONCE
Status: COMPLETED | OUTPATIENT
Start: 2024-01-28 | End: 2024-01-28

## 2024-01-28 RX ORDER — SODIUM CHLORIDE 0.9 % (FLUSH) 0.9 %
5-40 SYRINGE (ML) INJECTION PRN
Status: DISCONTINUED | OUTPATIENT
Start: 2024-01-28 | End: 2024-02-08 | Stop reason: HOSPADM

## 2024-01-28 RX ORDER — ACETAMINOPHEN 325 MG/1
650 TABLET ORAL EVERY 6 HOURS PRN
Status: DISCONTINUED | OUTPATIENT
Start: 2024-01-28 | End: 2024-02-08 | Stop reason: HOSPADM

## 2024-01-28 RX ORDER — SODIUM CHLORIDE, SODIUM LACTATE, POTASSIUM CHLORIDE, AND CALCIUM CHLORIDE .6; .31; .03; .02 G/100ML; G/100ML; G/100ML; G/100ML
30 INJECTION, SOLUTION INTRAVENOUS ONCE
Status: COMPLETED | OUTPATIENT
Start: 2024-01-28 | End: 2024-01-28

## 2024-01-28 RX ORDER — HEPARIN SODIUM 5000 [USP'U]/ML
5000 INJECTION, SOLUTION INTRAVENOUS; SUBCUTANEOUS 2 TIMES DAILY
Status: DISCONTINUED | OUTPATIENT
Start: 2024-01-28 | End: 2024-01-28 | Stop reason: SDUPTHER

## 2024-01-28 RX ORDER — HEPARIN SODIUM 1000 [USP'U]/ML
60 INJECTION, SOLUTION INTRAVENOUS; SUBCUTANEOUS ONCE
Status: COMPLETED | OUTPATIENT
Start: 2024-01-28 | End: 2024-01-28

## 2024-01-28 RX ORDER — LEVALBUTEROL 1.25 MG/.5ML
1.25 SOLUTION, CONCENTRATE RESPIRATORY (INHALATION)
Status: DISCONTINUED | OUTPATIENT
Start: 2024-01-28 | End: 2024-01-28

## 2024-01-28 RX ORDER — DILTIAZEM HYDROCHLORIDE 5 MG/ML
10 INJECTION INTRAVENOUS ONCE
Status: DISCONTINUED | OUTPATIENT
Start: 2024-01-28 | End: 2024-01-28

## 2024-01-28 RX ORDER — HYDROXYZINE HYDROCHLORIDE 50 MG/ML
25 INJECTION, SOLUTION INTRAMUSCULAR
Status: COMPLETED | OUTPATIENT
Start: 2024-01-28 | End: 2024-01-28

## 2024-01-28 RX ORDER — SODIUM CHLORIDE, SODIUM LACTATE, POTASSIUM CHLORIDE, AND CALCIUM CHLORIDE .6; .31; .03; .02 G/100ML; G/100ML; G/100ML; G/100ML
1000 INJECTION, SOLUTION INTRAVENOUS ONCE
Status: COMPLETED | OUTPATIENT
Start: 2024-01-28 | End: 2024-01-28

## 2024-01-28 RX ORDER — LEVALBUTEROL INHALATION SOLUTION 0.63 MG/3ML
0.63 SOLUTION RESPIRATORY (INHALATION)
Status: DISCONTINUED | OUTPATIENT
Start: 2024-01-28 | End: 2024-02-03

## 2024-01-28 RX ORDER — SODIUM CHLORIDE, SODIUM LACTATE, POTASSIUM CHLORIDE, CALCIUM CHLORIDE 600; 310; 30; 20 MG/100ML; MG/100ML; MG/100ML; MG/100ML
INJECTION, SOLUTION INTRAVENOUS CONTINUOUS
Status: ACTIVE | OUTPATIENT
Start: 2024-01-28 | End: 2024-01-28

## 2024-01-28 RX ORDER — SODIUM CHLORIDE 9 MG/ML
INJECTION, SOLUTION INTRAVENOUS PRN
Status: DISCONTINUED | OUTPATIENT
Start: 2024-01-28 | End: 2024-02-08 | Stop reason: HOSPADM

## 2024-01-28 RX ORDER — METOPROLOL TARTRATE 1 MG/ML
5 INJECTION, SOLUTION INTRAVENOUS EVERY 6 HOURS PRN
Status: DISCONTINUED | OUTPATIENT
Start: 2024-01-28 | End: 2024-01-29

## 2024-01-28 RX ORDER — HYDROXYZINE HYDROCHLORIDE 50 MG/ML
25 INJECTION, SOLUTION INTRAMUSCULAR ONCE
Status: DISCONTINUED | OUTPATIENT
Start: 2024-01-28 | End: 2024-01-28

## 2024-01-28 RX ORDER — POTASSIUM CHLORIDE 7.45 MG/ML
10 INJECTION INTRAVENOUS
Status: COMPLETED | OUTPATIENT
Start: 2024-01-28 | End: 2024-01-28

## 2024-01-28 RX ORDER — ACETAMINOPHEN 650 MG/1
SUPPOSITORY RECTAL
Status: COMPLETED
Start: 2024-01-28 | End: 2024-01-28

## 2024-01-28 RX ORDER — ACETAMINOPHEN 650 MG/1
650 SUPPOSITORY RECTAL EVERY 6 HOURS PRN
Status: DISCONTINUED | OUTPATIENT
Start: 2024-01-28 | End: 2024-02-08 | Stop reason: HOSPADM

## 2024-01-28 RX ORDER — ONDANSETRON 2 MG/ML
4 INJECTION INTRAMUSCULAR; INTRAVENOUS EVERY 6 HOURS PRN
Status: DISCONTINUED | OUTPATIENT
Start: 2024-01-28 | End: 2024-02-08 | Stop reason: HOSPADM

## 2024-01-28 RX ADMIN — MEROPENEM 1000 MG: 1 INJECTION INTRAVENOUS at 08:42

## 2024-01-28 RX ADMIN — DILTIAZEM HYDROCHLORIDE 12.5 MG/HR: 5 INJECTION, SOLUTION INTRAVENOUS at 10:03

## 2024-01-28 RX ADMIN — HEPARIN SODIUM 12 UNITS/KG/HR: 10000 INJECTION, SOLUTION INTRAVENOUS at 10:57

## 2024-01-28 RX ADMIN — VANCOMYCIN HYDROCHLORIDE 1000 MG: 1 INJECTION, POWDER, LYOPHILIZED, FOR SOLUTION INTRAVENOUS at 03:21

## 2024-01-28 RX ADMIN — DILTIAZEM HYDROCHLORIDE 5 MG/HR: 5 INJECTION, SOLUTION INTRAVENOUS at 03:23

## 2024-01-28 RX ADMIN — SODIUM CHLORIDE, POTASSIUM CHLORIDE, SODIUM LACTATE AND CALCIUM CHLORIDE 500 ML: 600; 310; 30; 20 INJECTION, SOLUTION INTRAVENOUS at 09:47

## 2024-01-28 RX ADMIN — SODIUM CHLORIDE, SODIUM LACTATE, POTASSIUM CHLORIDE, AND CALCIUM CHLORIDE 1320 ML: .6; .31; .03; .02 INJECTION, SOLUTION INTRAVENOUS at 03:03

## 2024-01-28 RX ADMIN — DILTIAZEM HYDROCHLORIDE 10 MG/HR: 5 INJECTION, SOLUTION INTRAVENOUS at 10:13

## 2024-01-28 RX ADMIN — POTASSIUM CHLORIDE 10 MEQ: 10 INJECTION, SOLUTION INTRAVENOUS at 17:09

## 2024-01-28 RX ADMIN — POTASSIUM CHLORIDE 10 MEQ: 10 INJECTION, SOLUTION INTRAVENOUS at 10:10

## 2024-01-28 RX ADMIN — HYDROXYZINE HYDROCHLORIDE 25 MG: 50 INJECTION, SOLUTION INTRAMUSCULAR at 15:52

## 2024-01-28 RX ADMIN — SODIUM CHLORIDE, PRESERVATIVE FREE 10 ML: 5 INJECTION INTRAVENOUS at 21:47

## 2024-01-28 RX ADMIN — HEPARIN SODIUM 5000 UNITS: 5000 INJECTION INTRAVENOUS; SUBCUTANEOUS at 08:44

## 2024-01-28 RX ADMIN — DIGOXIN 500 MCG: 0.25 INJECTION INTRAMUSCULAR; INTRAVENOUS at 08:03

## 2024-01-28 RX ADMIN — HEPARIN SODIUM 12 UNITS/KG/HR: 10000 INJECTION, SOLUTION INTRAVENOUS at 17:07

## 2024-01-28 RX ADMIN — DILTIAZEM HYDROCHLORIDE 5 MG/HR: 5 INJECTION, SOLUTION INTRAVENOUS at 10:38

## 2024-01-28 RX ADMIN — POTASSIUM CHLORIDE 10 MEQ: 10 INJECTION, SOLUTION INTRAVENOUS at 18:29

## 2024-01-28 RX ADMIN — ACETAMINOPHEN 650 MG: 650 SUPPOSITORY RECTAL at 03:02

## 2024-01-28 RX ADMIN — SODIUM CHLORIDE, SODIUM LACTATE, POTASSIUM CHLORIDE, AND CALCIUM CHLORIDE 1000 ML: .6; .31; .03; .02 INJECTION, SOLUTION INTRAVENOUS at 05:51

## 2024-01-28 RX ADMIN — SODIUM CHLORIDE, POTASSIUM CHLORIDE, SODIUM LACTATE AND CALCIUM CHLORIDE: 600; 310; 30; 20 INJECTION, SOLUTION INTRAVENOUS at 08:27

## 2024-01-28 RX ADMIN — AMIODARONE HYDROCHLORIDE 1 MG/MIN: 50 INJECTION, SOLUTION INTRAVENOUS at 11:05

## 2024-01-28 RX ADMIN — SODIUM CHLORIDE, PRESERVATIVE FREE 40 MG: 5 INJECTION INTRAVENOUS at 21:45

## 2024-01-28 RX ADMIN — AZITHROMYCIN MONOHYDRATE 500 MG: 500 INJECTION, POWDER, LYOPHILIZED, FOR SOLUTION INTRAVENOUS at 04:45

## 2024-01-28 RX ADMIN — AMIODARONE HYDROCHLORIDE 0.5 MG/MIN: 50 INJECTION, SOLUTION INTRAVENOUS at 21:03

## 2024-01-28 RX ADMIN — DILTIAZEM HYDROCHLORIDE 7.5 MG/HR: 5 INJECTION, SOLUTION INTRAVENOUS at 10:34

## 2024-01-28 RX ADMIN — MEROPENEM 500 MG: 500 INJECTION, POWDER, FOR SOLUTION INTRAVENOUS at 18:37

## 2024-01-28 RX ADMIN — HEPARIN SODIUM 2640 UNITS: 1000 INJECTION INTRAVENOUS; SUBCUTANEOUS at 10:53

## 2024-01-28 RX ADMIN — POTASSIUM CHLORIDE 10 MEQ: 10 INJECTION, SOLUTION INTRAVENOUS at 11:47

## 2024-01-28 RX ADMIN — CEFEPIME HYDROCHLORIDE 2000 MG: 2 INJECTION, POWDER, FOR SOLUTION INTRAVENOUS at 03:02

## 2024-01-28 RX ADMIN — IOPAMIDOL 75 ML: 755 INJECTION, SOLUTION INTRAVENOUS at 16:17

## 2024-01-28 RX ADMIN — POTASSIUM CHLORIDE 10 MEQ: 10 INJECTION, SOLUTION INTRAVENOUS at 19:52

## 2024-01-28 ASSESSMENT — PAIN SCALES - PAIN ASSESSMENT IN ADVANCED DEMENTIA (PAINAD)
BREATHING: 0
TOTALSCORE: 0
FACIALEXPRESSION: 0
NEGVOCALIZATION: 0
TOTALSCORE: 0
NEGVOCALIZATION: 0
BODYLANGUAGE: 0
CONSOLABILITY: 0
BODYLANGUAGE: 0
FACIALEXPRESSION: 0
CONSOLABILITY: 0
BREATHING: 0
BODYLANGUAGE: 0
TOTALSCORE: 0
NEGVOCALIZATION: 0
CONSOLABILITY: 0
FACIALEXPRESSION: 0
BODYLANGUAGE: 0
BREATHING: 0
NEGVOCALIZATION: 0
BODYLANGUAGE: 0
NEGVOCALIZATION: 0
NEGVOCALIZATION: 0
FACIALEXPRESSION: 0
BREATHING: 0
FACIALEXPRESSION: 0
FACIALEXPRESSION: 0
CONSOLABILITY: 0
BODYLANGUAGE: 0
BODYLANGUAGE: 0
NEGVOCALIZATION: 0
NEGVOCALIZATION: 0
BODYLANGUAGE: 0
TOTALSCORE: 0
BREATHING: 0
BREATHING: 0
BODYLANGUAGE: 0
BODYLANGUAGE: 0
TOTALSCORE: 0
TOTALSCORE: 0
FACIALEXPRESSION: 0
NEGVOCALIZATION: 0
TOTALSCORE: 0
NEGVOCALIZATION: 0
TOTALSCORE: 0
BREATHING: 0
TOTALSCORE: 0
BODYLANGUAGE: 0
TOTALSCORE: 0
BODYLANGUAGE: 0
BREATHING: 0
BODYLANGUAGE: 0
BREATHING: 0
FACIALEXPRESSION: 0
CONSOLABILITY: 0
FACIALEXPRESSION: 0
FACIALEXPRESSION: 0
NEGVOCALIZATION: 0
NEGVOCALIZATION: 0
BODYLANGUAGE: 0
BODYLANGUAGE: 0
NEGVOCALIZATION: 0
BREATHING: 0
BODYLANGUAGE: 0
CONSOLABILITY: 0
TOTALSCORE: 0
CONSOLABILITY: 0
TOTALSCORE: 0
FACIALEXPRESSION: 0
NEGVOCALIZATION: 0
BREATHING: 0
NEGVOCALIZATION: 0
BREATHING: 0
NEGVOCALIZATION: 0
BREATHING: 0
BODYLANGUAGE: 0
FACIALEXPRESSION: 0
TOTALSCORE: 0
FACIALEXPRESSION: 0
CONSOLABILITY: 0
FACIALEXPRESSION: 0
CONSOLABILITY: 0
FACIALEXPRESSION: 0
TOTALSCORE: 0
TOTALSCORE: 0
BREATHING: 0
FACIALEXPRESSION: 0
CONSOLABILITY: 0
BREATHING: 0
FACIALEXPRESSION: 0
NEGVOCALIZATION: 0
BODYLANGUAGE: 0
TOTALSCORE: 0
CONSOLABILITY: 0
FACIALEXPRESSION: 0
CONSOLABILITY: 0
FACIALEXPRESSION: 0
NEGVOCALIZATION: 0
BODYLANGUAGE: 0
BODYLANGUAGE: 0
NEGVOCALIZATION: 0
BREATHING: 0
BREATHING: 0
TOTALSCORE: 0
BODYLANGUAGE: 0
FACIALEXPRESSION: 0
CONSOLABILITY: 0
TOTALSCORE: 0
BREATHING: 0
CONSOLABILITY: 0
NEGVOCALIZATION: 0
CONSOLABILITY: 0
FACIALEXPRESSION: 0
CONSOLABILITY: 0
NEGVOCALIZATION: 0
TOTALSCORE: 0
BODYLANGUAGE: 0
BREATHING: 0
NEGVOCALIZATION: 0
CONSOLABILITY: 0
CONSOLABILITY: 0
TOTALSCORE: 0
CONSOLABILITY: 0
CONSOLABILITY: 0
FACIALEXPRESSION: 0

## 2024-01-28 ASSESSMENT — PAIN SCALES - GENERAL
PAINLEVEL_OUTOF10: 0

## 2024-01-28 ASSESSMENT — PAIN SCALES - WONG BAKER: WONGBAKER_NUMERICALRESPONSE: 0

## 2024-01-28 ASSESSMENT — PAIN - FUNCTIONAL ASSESSMENT: PAIN_FUNCTIONAL_ASSESSMENT: NONE - DENIES PAIN

## 2024-01-28 NOTE — H&P
Chart reviewed, patient seen and examined independently on 1/28/2024.  I discussed and agree with work-up evaluation, management and diagnosis of the IM resident Dr. Vogel.  My assessment reveals an 83-year-old female lifelong smoker with significant past history of COPD not on oxygen, diabetes type 2, hypertension hyperlipidemia, on chronic benzos and opioids who was brought to the emergency room unresponsive, hypoxic and tachycardic in the 170s.  In emergency room she had a low-grade temperature of 37.8, initial blood pressure was 102/52, heart rate was 180 irregular, respiratory rate 20 was 95% on 15 L nonrebreather.  Preliminary workup was significant for a BUN of 195 with a creatinine of 2 and anion gap of 25 mg CO2 23, lactic acid of 12.7, glucose 102, troponin 31 with an albumin of 3.2, hemoglobin of 16.8, influenza A positive, yeast in the urine but otherwise no evidence of infection.  Chest x-ray showed significant bilateral lower lobe consolidations.  An EKG was A-fib for RVR.  On my exam patient is disheveled, cachectic, acutely ill-appearing in no acute distress, lethargic and confused.  She is normocephalic/atraumatic with anicteric sclerae and dry oral mucosa.  Neck is supple without JVD or thyromegaly.  Lungs without rales, rhonchi or wheezes but tachypneic.  Abdomen was soft, flat nontender with normal bowel sounds.  No suprapubic tenderness.  Extremities with marked muscle wasting, no edema.  Skin is dry, sallow.  Neuro and psych unable to assess due to her encephalopathic state and unable to follow complex commands, however moving all extremities.

## 2024-01-28 NOTE — CONSULTS
Cardiology Consultation                                                                    Pt Name: Zuleima Mir  Age: 83 y.o.  Sex: female  : 1940  Location: [facility]/4452-01    Referring Physician: Nathalie Gaming MD  Primary cardiologist: claudia Mccall      Reason for Consult:     Reason for Consultation/Chief Complaint: PAFRVR    HPI:      Zuleima Mir is a 83 y.o. female with a past medical history of smoker, COPD not on oxygen, IBS, HTN, HLP, DM, on chronic benzodiazepines and opioids.    Patient presented on  with decreased p.o. intake, altered mental status and fatigue with cough over the last few weeks.  She was hypoxic, febrile (Tmax 100.4), severely tachycardic 180s with low normal BP, requiring 15 L of supplemental oxygen for adequate saturation.  Creatinine 2.0, chest x-ray consistent with multifocal pneumonia.  ECG consistent with  bpm, nonspecific changes.  She was admitted for sepsis due to community-acquired pneumonia, NYA, PAF RVR, influenza infection.  She was initially placed on diltiazem drip and was given digoxin 500 mcg IV x 1 along with IV antibiotics.  She received LR 2 L as a bolus and was placed on  mill per hour.  Cardiology was consulted.  Patient subsequently became hypotensive and diltiazem drip was changed to amiodarone drip after discussing the case with me.    Patient currently appears hemodynamically stable.  Telemetry personally reviewed, reveals normal sinus rhythm rates in the 80s.  She is now requiring only 5 L of supplemental oxygen.  Heparin drip was held by primary team due to positive Hemoccult and GI was consulted.  Hemoglobin down to 13.9 from 16.8 (I suspect hemodilution after receiving IV fluids).  Patient appears confused, does not report any complaints.      Histories     Past Medical History:   has a past medical history of Allergic rhinitis, Emphysema (subcutaneous) (surgical) resulting from a procedure, Hyperlipidemia, Hypertension, IBS (irritable  bowel syndrome), Pancreatic cyst, Type II or unspecified type diabetes mellitus without mention of complication, not stated as uncontrolled, Wears hearing aid in both ears, and Wears partial dentures.    Surgical History:   has a past surgical history that includes Cholecystectomy; Hysterectomy; Breast surgery; Colonoscopy; Upper gastrointestinal endoscopy (N/A, 2/26/2020); and Upper gastrointestinal endoscopy (2/26/2020).     Social History:   reports that she has been smoking cigarettes. She has never used smokeless tobacco. She reports that she does not drink alcohol and does not use drugs.     Family History:  No evidence for sudden cardiac death or premature CAD      Medications:       Home Medications  Were reviewed and are listed in nursing record. and/or listed below  Prior to Admission medications    Medication Sig Start Date End Date Taking? Authorizing Provider   ALPRAZolam (XANAX) 0.25 MG tablet Take 1 tablet by mouth 2 times daily as needed for Sleep (to start March 4 , 2020).   Yes Saniya Ly MD   fluticasone (FLONASE) 50 MCG/ACT nasal spray 2 sprays by Each Nostril route daily   Yes Saniya Ly MD   oxyCODONE (OXY-IR) 30 MG immediate release tablet Take 1 tablet by mouth every 4 hours as needed for Pain.   Yes Saniya Ly MD   albuterol sulfate  (90 Base) MCG/ACT inhaler Inhale 2 puffs into the lungs every 4 hours as needed for Wheezing   Yes Saniya Ly MD   clotrimazole (LOTRIMIN) 1 % cream Apply topically 2 times daily Apply topically 2 times daily.    ProviderSaniya MD          Inpatient Medications:   budesonide  0.25 mg Nebulization BID RT    sodium chloride flush  5-40 mL IntraVENous 2 times per day    meropenem  500 mg IntraVENous Q12H    [START ON 1/29/2024] vancomycin  500 mg IntraVENous Once    [Held by provider] apixaban  2.5 mg Oral BID    metoprolol tartrate  25 mg Oral BID    levalbuterol  0.63 mg Nebulization Q4H WA RT

## 2024-01-28 NOTE — PROGRESS NOTES
4 Eyes Skin Assessment     NAME:  Zuleima Mir  YOB: 1940  MEDICAL RECORD NUMBER:  [id number]    The patient is being assessed for  Admission    I agree that at least one RN has performed a thorough Head to Toe Skin Assessment on the patient. ALL assessment sites listed below have been assessed.      Areas assessed by both nurses:    Head, Face, Ears, Shoulders, Back, Chest, Arms, Elbows, Hands, Sacrum. Buttock, Coccyx, Ischium, Legs. Feet and Heels, and Under Medical Devices         Does the Patient have a Wound? Yes wound(s) were present on assessment. LDA wound assessment was Initiated and completed by RN       [place] Prevention initiated by RN: Yes  Wound Care Orders initiated by RN: Yes    Pressure Injury (Stage 3,4, Unstageable, DTI, NWPT, and Complex wounds) if present, place Wound referral order by RN under : Yes    New Ostomies, if present place, Ostomy referral order under : No     Nurse 1 eSignature: Electronically signed by Andrei Pérez RN on 1/28/24 at 7:05 PM EST    **SHARE this note so that the co-signing nurse can place an eSignature**    Nurse 2 eSignature: Electronically signed by Andrew Ward RN on 1/28/24 at 8:44 PM EST

## 2024-01-28 NOTE — PROGRESS NOTES
Progress Note    Admit Date: 1/28/2024  Day: 0  Diet: Diet NPO Exceptions are: Sips of Water with Meds    CC: AMS, fatigue    Interval history:   Pt with persistent atrial fibrillation with RVR in the 160-170s. Intermittently hypotensive to the 90s. Attempted fluid resuscitation with 500 mL bolus this am with insufficient improvement. Discussed case with Cardiology. Given soft blood pressures; unable to administer metoprolol. Emphasized rhythm control and started on amiodarine drip with heparin drip. Converted back to NSR; pt with dark stool but continued heparin gtt given thrombembolic risk of afib conversion. Pt remains encephalopathic although at times speaks clear sentences -- still not holding conversation or following commands however. Remains on 10 L O2. Discussed with family, POA (daughter) would like to keep full code right now and think about DNR status.     HPI:   Zuleima Mir is a 83 y.o. female, presented with AMS. Patient has a PMHx of   IBS, HTN, HLD, allergic rhinitis, DM2.  Patient is too altered to converse however her daughter was in the room to provide information.  She mentions that over the past few weeks her mother started becoming more fatigued, weak, altered, had been eating less, and developed a productive cough.  She is unsure if it is a thick or thin sputum or its color.  She said that she had become so weak that her legs gave out and she was not able to walk.  She wanted to bring her to the hospital sooner however her mother denied.     At the ED, , /52.  She was found to have atrial fibrillation on monitor and her daughter denies her ever having atrial fibrillation nor it is not seen on her chart.  She was started on a Dilt gtt which improved her heart rate to ~140-160s and was given a bolus of 1.3L of LR which improved her BP as well.  Her labs were pertinent for creatinine of 2.0 which is elevated from her baseline of around 0.8, LA 3.4-> 12.7, troponin 31.  She is influenza  heparin ggt if needed  - Continue continuous fluids  - Echo  -  ml/hr     ## Dark stool, single episode  Pt with recent worsening of abdominal pain. Noted to have single episode of dark stool after starting heparin drip today.   - Heparin drip held, but restarted given concern for thromboembolism  - Will trend Hgb with regular H&H, hold heparin gtt if significant drop     #NYA  Likely due to renal hypoperfusion with component of cardiogenic shock vs decreased PO intake  - Manage afib as described above. That should improve her renal perfusion  -  ml/hr     DVT PPx: Heparin  Diet: Diet NPO exceptions are sips of water with meds  Code status:  Full code  ELOS: 3 days  Barriers to discharge: severe sepsis  Disposition  - Preadmission: Home  - Current: IP  - Upon discharge: Home    Richard Tamayo MD, PGY-1  01/28/24  4:39 PM    This patient has been staffed and discussed with Nathalie Gaming MD.

## 2024-01-28 NOTE — PROGRESS NOTES
At 1352 alerted that pt appeared to have converted to sinus rhythm. Order for STAT EKG placed per protocol. EKG completed. MD notified of results. No new orders received. Electronically signed by Andrei Pérez RN on 1/28/2024 at 2:30 PM

## 2024-01-28 NOTE — ED PROVIDER NOTES
established %    Carboxyhemoglobin 1.1 0.0 - 1.5 %    MetHgb, Terrence 0.1 0.0 - 1.5 %    TC02 (Calc), Terrence 29 mmol/L    Hemoglobin, Terrence, Reduced 74.90 %     EKG   Interpreted in conjunction with emergencydepartment physician Matilde Peng MD  Rhythm: atrial fibrillation - rapid  Rate: tachycardia  Axis: normal  Ectopy: none  Conduction: normal  ST Segments: no acute change  T Waves:no acute change  Q Waves: none  Clinical Impression: atrial fibrillation (with RVR)    ED BEDSIDE ULTRASOUND:  No results found.    RECENT VITALS:  BP: 129/81, Temp: 100.4 °F (38 °C), Pulse: (!) 178,Respirations: (!) 33, SpO2: 100 %     Procedures     None    ED Course     Nursing Notes, Past Medical Hx, Past Surgical Hx, Social Hx, Allergies, and Family Hx were reviewed.         The patient was given the following medications:  Orders Placed This Encounter   Medications    ceFEPIme (MAXIPIME) 2,000 mg in sodium chloride 0.9 % 100 mL IVPB (mini-bag)     Order Specific Question:   Antimicrobial Indications     Answer:   Sepsis of Unknown Etiology     Order Specific Question:   Sepsis duration of therapy     Answer:   Other    vancomycin (VANCOCIN) 1,000 mg in sodium chloride 0.9 % 250 mL IVPB     Order Specific Question:   Antimicrobial Indications     Answer:   Sepsis of Unknown Etiology     Order Specific Question:   Sepsis duration of therapy     Answer:   Other    DISCONTD: dilTIAZem injection 10 mg    DISCONTD: dilTIAZem 125 mg in sodium chloride 0.9 % 125 mL infusion     Order Specific Question:   Titrate Infusion?     Answer:   Yes     Order Specific Question:   Initial Infusion Dose:     Answer:   5 mg/hr     Order Specific Question:   Goal of Therapy is:     Answer:   HR less than 120 bpm     Order Specific Question:   Contact Provider if:     Answer:   SBP less than 90 mmHg     Order Specific Question:   Contact Provider if:     Answer:   HR less than 60 bpm     Order Specific Question:   HOLD for     Answer:   SBP less than 90 mmHg  tobacco. She reports that she does not drink alcohol and does not use drugs.    Medications     Previous Medications    ALBUTEROL SULFATE  (90 BASE) MCG/ACT INHALER    Inhale 2 puffs into the lungs every 4 hours as needed for Wheezing    ALPRAZOLAM (XANAX) 0.25 MG TABLET    Take 0.25 mg by mouth 2 times daily as needed for Sleep (to start March 4 , 2020).    CLOTRIMAZOLE (LOTRIMIN) 1 % CREAM    Apply topically 2 times daily Apply topically 2 times daily.    FLUOXETINE (PROZAC) 20 MG CAPSULE    Take 20 mg by mouth daily    FLUTICASONE (FLONASE) 50 MCG/ACT NASAL SPRAY    2 sprays by Each Nostril route daily    MOMETASONE-FORMOTEROL (DULERA) 200-5 MCG/ACT INHALER    Inhale 2 puffs into the lungs every 12 hours    OXYCODONE (OXY-IR) 30 MG IMMEDIATE RELEASE TABLET    Take 30 mg by mouth every 4 hours as needed for Pain.       Allergies     She is allergic to pcn [penicillins].    Physical Exam     INITIAL VITALS: BP: (!) 102/52, Temp: 100 °F (37.8 °C), Pulse: (!) 180, Respirations: 20, SpO2: 95 %   Physical Exam  Constitutional:       General: She is in acute distress.      Appearance: She is ill-appearing and toxic-appearing. She is not diaphoretic.      Comments: Very disheveled on presentation and cachetic. Sacral ulcers present. Patient brought in [facility] is unkempt house sheets   Eyes:      Pupils:      Right eye: Pupil is reactive.      Left eye: Pupil is reactive.   Cardiovascular:      Rate and Rhythm: Tachycardia present.      Comments: Afib with RVR  Pulmonary:      Breath sounds: Normal breath sounds. No wheezing or rhonchi.      Comments: Increased work of breathing with pursing/puffing at the lips  Neurological:      Mental Status: She is lethargic and disoriented.      GCS: GCS eye subscore is 3. GCS verbal subscore is 1. GCS motor subscore is 6.      Cranial Nerves: No facial asymmetry.      Motor: Atrophy present. No seizure activity.                Idania Baldwin MD  Resident  01/28/24 [id number]

## 2024-01-28 NOTE — PLAN OF CARE
[facility] Hospitalist brief note  Consult received.  Case reviewed with ER physician  83-year-old female admitted for severe sepsis with acute organ dysfunction, influenza A and pneumonia.  Full note to follow.    Van Neal    Thanks  Olivia Medina MD

## 2024-01-28 NOTE — H&P
Internal Medicine H&P    Patient Name: Zuleima Mir   Admit Date: 1/28/2024   Code:No Order  PCP: Van Neal   Attending: Olivia Medina MD    Chief Complaint: Altered Mental Status       Subjective   HPI:   Zuleima Mir is a 83 y.o. female, presented with AMS. Patient has a PMHx of   IBS, HTN, HLD, allergic rhinitis, DM2.  Patient is too altered to converse however her daughter was in the room to provide information.  She mentions that over the past few weeks her mother started becoming more fatigued, weak, altered, had been eating less, and developed a productive cough.  She is unsure if it is a thick or thin sputum or its color.  She said that she had become so weak that her legs gave out and she was not able to walk.  She wanted to bring her to the hospital sooner however her mother denied.    At the ED, , /52.  She was found to have atrial fibrillation on monitor and her daughter denies her ever having atrial fibrillation nor it is not seen on her chart.  She was started on a Dilt gtt which improved her heart rate to ~140-160s and was given a bolus of 1.3L of LR which improved her BP as well.  Her labs were pertinent for creatinine of 2.0 which is elevated from her baseline of around 0.8, LA 3.4-> 12.7, troponin 31.  She is influenza A positive.  CXR showed extensive left lower lung and patchy right lower lung consolidation most compatible with multifocal pneumonia.    She will be admitted to [facility] to further manage her septic pneumonia and NYA.    Past Medical Hx:      Diagnosis Date    Allergic rhinitis     Emphysema (subcutaneous) (surgical) resulting from a procedure     Hyperlipidemia     Hypertension     IBS (irritable bowel syndrome)     Pancreatic cyst     Type II or unspecified type diabetes mellitus without mention of complication, not stated as uncontrolled     diet controlled    Wears hearing aid in both ears     Wears partial dentures     lower       Past Surgical Hx:

## 2024-01-28 NOTE — ED PROVIDER NOTES
Specimen: Urine   Result Value Ref Range    Color, UA Yellow Straw/Yellow    Clarity, UA Clear Clear    Glucose, Ur Negative Negative mg/dL    Bilirubin Urine SMALL (A) Negative    Ketones, Urine Negative Negative mg/dL    Specific Gravity, UA >=1.030 1.005 - 1.030    Blood, Urine TRACE (A) Negative    pH, UA 5.5 5.0 - 8.0    Protein,  (A) Negative mg/dL    Urobilinogen, Urine 0.2 <2.0 E.U./dL    Nitrite, Urine Negative Negative    Leukocyte Esterase, Urine Negative Negative    Microscopic Examination YES     Urine Type Voided     Urine Reflex to Culture Not Indicated    Blood gas, venous (Lab)   Result Value Ref Range    pH, Terrence 7.387 7.350 - 7.450    pCO2, Terrence 45.8 41.0 - 51.0 mmHg    pO2, Terrence <30.0 25.0 - 40.0 mmHg    HCO3, Venous 27.5 24.0 - 28.0 mmol/L    Base Excess, Terrence 1.7 -2.0 - 3.0 mmol/L    O2 Sat, Terrence 24 Not established %    Carboxyhemoglobin 1.1 0.0 - 1.5 %    MetHgb, Terrence 0.1 0.0 - 1.5 %    TC02 (Calc), Terrence 29 mmol/L    Hemoglobin, Terrence, Reduced 74.90 %   Hepatic Function Panel   Result Value Ref Range    Bilirubin, Direct 0.6 (H) 0.0 - 0.3 mg/dL    Bilirubin, Indirect 0.4 0.0 - 1.0 mg/dL   Microscopic Urinalysis   Result Value Ref Range    Hyaline Casts, UA 0-2 0 - 2 /LPF    Fine Casts, UA 0-2 0 - 2 /LPF    WBC, UA 3-5 0 - 5 /HPF    RBC, UA 0-2 0 - 4 /HPF    Bacteria, UA 2+ (A) None Seen /HPF    Yeast, UA Present (A) None Seen /HPF   TYPE AND SCREEN   Result Value Ref Range    ABO/Rh O POS     Antibody Screen NEG          MOST RECENT VITALS:  BP: 116/65, Temp: 100.4 °F (38 °C), Pulse: (!) 171, Respirations: (!) 32, SpO2: 94 %     Procedures         ED Course          The patient was given the following medications:  Orders Placed This Encounter   Medications    ceFEPIme (MAXIPIME) 2,000 mg in sodium chloride 0.9 % 100 mL IVPB (mini-bag)     Order Specific Question:   Antimicrobial Indications     Answer:   Sepsis of Unknown Etiology     Order Specific Question:   Sepsis duration of therapy

## 2024-01-28 NOTE — ED NOTES
ED TO INPATIENT SBAR HANDOFF    Patient Name: Zuleima Mir   :  1940  83 y.o.   MRN:  [id number]  Preferred Name  Zuleima  ED Room #:    Family/Caregiver Present no   Restraints no   Sitter no   Sepsis Risk Score Sepsis Risk Score: 6.32    Situation  Code Status: Full Code.    Allergies: Pcn [penicillins]  Weight: Patient Vitals for the past 96 hrs (Last 3 readings):   Weight   24 0247 44 kg (97 lb)     Arrived from: home  Chief Complaint:   Chief Complaint   Patient presents with    Altered Mental Status     Hospital Problem/Diagnosis:  Principal Problem:    Severe sepsis (HCC)  Resolved Problems:    * No resolved hospital problems. *    Imaging:   CT HEAD WO CONTRAST   Final Result      Left mastoid disease.      Age-related changes in the brain.      Electronically signed by Pankaj Andrade MD      XR CHEST PORTABLE   Final Result      Extensive left lower lung and patchy right lower lung consolidation most compatible with multifocal pneumonia.      Normal cardiomediastinal silhouette.      Electronically signed by Pankaj Andrade MD        Abnormal labs:   Abnormal Labs Reviewed   COVID-19 & INFLUENZA COMBO - Abnormal; Notable for the following components:       Result Value    INFLUENZA A DETECTED (*)     All other components within normal limits   LACTATE, SEPSIS - Abnormal; Notable for the following components:    Lactic Acid, Sepsis 3.4 (*)     All other components within normal limits   CBC WITH AUTO DIFFERENTIAL - Abnormal; Notable for the following components:    RBC 5.27 (*)     Hemoglobin 16.8 (*)     Hematocrit 49.9 (*)     Neutrophils Absolute 8.6 (*)     Lymphocytes Absolute 0.7 (*)     Metamyelocytes Relative 2 (*)     All other components within normal limits   COMPREHENSIVE METABOLIC PANEL W/ REFLEX TO MG FOR LOW K - Abnormal; Notable for the following components:    Anion Gap 20 (*)     Glucose 102 (*)     BUN 95 (*)     Creatinine 2.0 (*)     Est, Glom Filt Rate 24 (*)      Albumin 3.2 (*)     Albumin/Globulin Ratio 0.8 (*)     AST 54 (*)     All other components within normal limits    Narrative:     CALL  [facility] tel. [phone],  Chemistry results called to and read back by BRITTNEY Hunt, 01/28/2024 03:35, by  LOBRE   URINALYSIS WITH REFLEX TO CULTURE - Abnormal; Notable for the following components:    Bilirubin Urine SMALL (*)     Blood, Urine TRACE (*)     Protein,  (*)     All other components within normal limits   HEPATIC FUNCTION PANEL - Abnormal; Notable for the following components:    Bilirubin, Direct 0.6 (*)     All other components within normal limits    Narrative:     CALL  [facility] tel. [phone],  Chemistry results called to and read back by BRITTNEY Hunt, 01/28/2024 03:35, by  LOBRE   MICROSCOPIC URINALYSIS - Abnormal; Notable for the following components:    Bacteria, UA 2+ (*)     Yeast, UA Present (*)     All other components within normal limits     Critical values: yes     Abnormal Assessment Findings: BUN 90    Background  History:   Past Medical History:   Diagnosis Date    Allergic rhinitis     Emphysema (subcutaneous) (surgical) resulting from a procedure     Hyperlipidemia     Hypertension     IBS (irritable bowel syndrome)     Pancreatic cyst     Type II or unspecified type diabetes mellitus without mention of complication, not stated as uncontrolled     diet controlled    Wears hearing aid in both ears     Wears partial dentures     lower       Assessment    Vitals/MEWS:    Level of Consciousness: Responds to pain (2)   Vitals:    01/28/24 0434 01/28/24 0435 01/28/24 0447 01/28/24 0455   BP:  117/87  (!) 106/45   Pulse: (!) 160 (!) 154  (!) 152   Resp: 30 28  27   Temp:   98.6 °F (37 °C)    SpO2: 97% 97%  90%   Weight:       Height:         FiO2 (%):   O2 Flow Rate: O2 Device: High flow nasal cannula O2 Flow Rate (L/min): 10 L/min  Cardiac Rhythm:    Pain Assessment:  [] Verbal [] Brooks Baker Scale  Pain Scale: Pain Assessment  Pain Assessment: None -

## 2024-01-28 NOTE — ED NOTES
Patient's HR not at goal. Perfect Serve message sent to Dr. Gaming, awaiting further orders.      Yoly Moscoso RN  01/28/24 0711

## 2024-01-28 NOTE — ED NOTES
SBAR report given to Yoly LUGO   Questions answered to Marilee Martinez, BRITTNEY  01/28/24 0701

## 2024-01-28 NOTE — PROGRESS NOTES
Pt's fecal occult positive. MD notified. Electronically signed by Andrei Pérez RN on 1/28/2024 at 2:29 PM

## 2024-01-28 NOTE — CONSULTS
Clinical Pharmacy Progress Note    Vancomycin - Management by Pharmacy    Consult Date(s): 1/28/24  Consulting Provider(s): Ara Haley MD     Assessment / Plan  CAP - Vancomycin  Concurrent Antimicrobials: Meropenem  Day of Vanc Therapy / Ordered Duration: 1 of 7  Current Dosing Method: Intermittent Dosing by Levels  Therapeutic Goal: Trough ~ 15 mg/L  Current Dose / Plan:   SCr at admission = 2 --> 1.4  Pt with NYA, improved significantly overnight  Will dose intermittently based on levels  Received 1000 mg vanc load in ED (23 mg/kg)   Will plan to dose q24h at this time.   Ordered vanc 500 mg to be given tonight.  Expect that renal function will continue to improve.  Level ordered for tomorrow AM.  MRSA nares in process, consider de-escalation from vanc if negative given respiratory source.  Will continue to monitor clinical condition and make adjustments to regimen as appropriate.    Thank you for consulting pharmacy,    Lan Daly, PharmD  PGY1 Pharmacy Resident  Phone: [phone]  Main Pharmacy: [id number]  1/28/2024 8:39 AM      Interval update:  Initiation    Subjective/Objective:   Zuleima Mir is a 83 y.o. female with a PMHx significant for IBS, HTN, HLD, allergic rhinitis, DM2 who is admitted with septic pneumonia and NYA.     Pharmacy is consulted to manage vancomycin.    Ht Readings from Last 1 Encounters:   01/28/24 1.575 m (5' 2\")     Wt Readings from Last 1 Encounters:   01/28/24 44 kg (97 lb)     Current & Prior Antimicrobial Regimen(s):  Meropenem 1000 mg q12h (1/28 - current)  Vancomycin  Date Vanc Level Vanc Dose   1/28 -- 1000 mg  (@ 0321)                 Vancomycin Level(s) / Doses:    Date Time Dose Type of Level / Level Interpretation                 Note: Serum levels collected for AUC-based dosing may be high if collected in close proximity to the dose administered. This is not necessarily indicative of toxicity.    Cultures & Sensitivities:    Date Site Micro Susceptibility / Result   1/28 MRSA  nares Sent    1/28 Blood x 2 Sent    1/28 Covid/Flu Influenza A    1/28 Resp Cx Sent    1/28 Strep/Legionella Sent    1/28 PNA panel Sent      Recent Labs     01/28/24  0250 01/28/24  0752   CREATININE 2.0* 1.4*   BUN 95* 87*   WBC 9.6  --        Estimated Creatinine Clearance: 20 mL/min (A) (based on SCr of 1.4 mg/dL (H)).    Additional Lab Values / Findings of Note:    No results for input(s): \"PROCAL\" in the last 72 hours.

## 2024-01-29 PROBLEM — A41.9 SEPTICEMIA (HCC): Status: ACTIVE | Noted: 2024-01-29

## 2024-01-29 PROBLEM — I26.99 ACUTE PULMONARY EMBOLISM WITHOUT ACUTE COR PULMONALE (HCC): Status: ACTIVE | Noted: 2024-01-29

## 2024-01-29 PROBLEM — I48.91 ATRIAL FIBRILLATION WITH RVR (HCC): Status: ACTIVE | Noted: 2024-01-29

## 2024-01-29 LAB
ALBUMIN SERPL-MCNC: 2.1 G/DL (ref 3.4–5)
ALP SERPL-CCNC: 43 U/L (ref 40–129)
ALT SERPL-CCNC: 36 U/L (ref 10–40)
ANION GAP SERPL CALCULATED.3IONS-SCNC: 13 MMOL/L (ref 3–16)
ANTI-XA UNFRAC HEPARIN: 0.13 IU/ML (ref 0.3–0.7)
ANTI-XA UNFRAC HEPARIN: <0.1 IU/ML (ref 0.3–0.7)
AST SERPL-CCNC: 76 U/L (ref 15–37)
BASOPHILS # BLD: 0 K/UL (ref 0–0.2)
BASOPHILS NFR BLD: 0 %
BILIRUB DIRECT SERPL-MCNC: 0.3 MG/DL (ref 0–0.3)
BILIRUB INDIRECT SERPL-MCNC: 0.4 MG/DL (ref 0–1)
BILIRUB SERPL-MCNC: 0.7 MG/DL (ref 0–1)
BUN SERPL-MCNC: 47 MG/DL (ref 7–20)
CALCIUM SERPL-MCNC: 9.3 MG/DL (ref 8.3–10.6)
CHLORIDE SERPL-SCNC: 111 MMOL/L (ref 99–110)
CO2 SERPL-SCNC: 21 MMOL/L (ref 21–32)
CREAT SERPL-MCNC: 0.6 MG/DL (ref 0.6–1.2)
DEPRECATED RDW RBC AUTO: 13.8 % (ref 12.4–15.4)
EKG ATRIAL RATE: 166 BPM
EKG ATRIAL RATE: 90 BPM
EKG DIAGNOSIS: NORMAL
EKG DIAGNOSIS: NORMAL
EKG P AXIS: 54 DEGREES
EKG P AXIS: 85 DEGREES
EKG P-R INTERVAL: 118 MS
EKG P-R INTERVAL: 124 MS
EKG Q-T INTERVAL: 216 MS
EKG Q-T INTERVAL: 338 MS
EKG QRS DURATION: 78 MS
EKG QRS DURATION: 98 MS
EKG QTC CALCULATION (BAZETT): 344 MS
EKG QTC CALCULATION (BAZETT): 413 MS
EKG R AXIS: 2 DEGREES
EKG R AXIS: 38 DEGREES
EKG T AXIS: 264 DEGREES
EKG T AXIS: 63 DEGREES
EKG VENTRICULAR RATE: 153 BPM
EKG VENTRICULAR RATE: 90 BPM
EOSINOPHIL # BLD: 0 K/UL (ref 0–0.6)
EOSINOPHIL NFR BLD: 0 %
GFR SERPLBLD CREATININE-BSD FMLA CKD-EPI: >60 ML/MIN/{1.73_M2}
GLUCOSE SERPL-MCNC: 77 MG/DL (ref 70–99)
HCT VFR BLD AUTO: 39.5 % (ref 36–48)
HCT VFR BLD AUTO: 40.6 % (ref 36–48)
HCT VFR BLD AUTO: 40.6 % (ref 36–48)
HGB BLD-MCNC: 12.8 G/DL (ref 12–16)
HGB BLD-MCNC: 13.7 G/DL (ref 12–16)
HGB BLD-MCNC: 14 G/DL (ref 12–16)
LACTATE BLDV-SCNC: 1.6 MMOL/L (ref 0.4–2)
LACTATE BLDV-SCNC: 1.7 MMOL/L (ref 0.4–2)
LACTATE BLDV-SCNC: 2.1 MMOL/L (ref 0.4–2)
LACTATE BLDV-SCNC: 2.1 MMOL/L (ref 0.4–2)
LACTATE BLDV-SCNC: 2.8 MMOL/L (ref 0.4–2)
LEGIONELLA AG UR QL: NORMAL
LYMPHOCYTES # BLD: 0.4 K/UL (ref 1–5.1)
LYMPHOCYTES NFR BLD: 5 %
MAGNESIUM SERPL-MCNC: 2.1 MG/DL (ref 1.8–2.4)
MCH RBC QN AUTO: 32.2 PG (ref 26–34)
MCHC RBC AUTO-ENTMCNC: 33.8 G/DL (ref 31–36)
MCV RBC AUTO: 95.4 FL (ref 80–100)
MONOCYTES # BLD: 0.5 K/UL (ref 0–1.3)
MONOCYTES NFR BLD: 7 %
NEUTROPHILS # BLD: 6.5 K/UL (ref 1.7–7.7)
NEUTROPHILS NFR BLD: 88 %
PHOSPHATE SERPL-MCNC: 1.7 MG/DL (ref 2.5–4.9)
PLATELET # BLD AUTO: 124 K/UL (ref 135–450)
PLATELET BLD QL SMEAR: ADEQUATE
PMV BLD AUTO: 8.9 FL (ref 5–10.5)
POTASSIUM SERPL-SCNC: 4.2 MMOL/L (ref 3.5–5.1)
PROT SERPL-MCNC: 5.8 G/DL (ref 6.4–8.2)
RBC # BLD AUTO: 4.26 M/UL (ref 4–5.2)
RBC MORPH BLD: NORMAL
REPORT: NORMAL
S PNEUM AG UR QL: NORMAL
SLIDE REVIEW: ABNORMAL
SODIUM SERPL-SCNC: 145 MMOL/L (ref 136–145)
T3 SERPL-MCNC: 0.71 NG/ML (ref 0.8–2)
TOXIC GRANULES BLD QL SMEAR: PRESENT
VANCOMYCIN SERPL-MCNC: 13.6 UG/ML
WBC # BLD AUTO: 7.4 K/UL (ref 4–11)

## 2024-01-29 PROCEDURE — 92610 EVALUATE SWALLOWING FUNCTION: CPT

## 2024-01-29 PROCEDURE — 6360000002 HC RX W HCPCS

## 2024-01-29 PROCEDURE — 83605 ASSAY OF LACTIC ACID: CPT

## 2024-01-29 PROCEDURE — 2580000003 HC RX 258

## 2024-01-29 PROCEDURE — 80076 HEPATIC FUNCTION PANEL: CPT

## 2024-01-29 PROCEDURE — 94761 N-INVAS EAR/PLS OXIMETRY MLT: CPT

## 2024-01-29 PROCEDURE — 85520 HEPARIN ASSAY: CPT

## 2024-01-29 PROCEDURE — 2700000000 HC OXYGEN THERAPY PER DAY

## 2024-01-29 PROCEDURE — 80202 ASSAY OF VANCOMYCIN: CPT

## 2024-01-29 PROCEDURE — 93010 ELECTROCARDIOGRAM REPORT: CPT | Performed by: INTERNAL MEDICINE

## 2024-01-29 PROCEDURE — A4216 STERILE WATER/SALINE, 10 ML: HCPCS

## 2024-01-29 PROCEDURE — 2060000000 HC ICU INTERMEDIATE R&B

## 2024-01-29 PROCEDURE — 85014 HEMATOCRIT: CPT

## 2024-01-29 PROCEDURE — 99291 CRITICAL CARE FIRST HOUR: CPT | Performed by: INTERNAL MEDICINE

## 2024-01-29 PROCEDURE — 36415 COLL VENOUS BLD VENIPUNCTURE: CPT

## 2024-01-29 PROCEDURE — 80069 RENAL FUNCTION PANEL: CPT

## 2024-01-29 PROCEDURE — 99223 1ST HOSP IP/OBS HIGH 75: CPT | Performed by: INTERNAL MEDICINE

## 2024-01-29 PROCEDURE — C9113 INJ PANTOPRAZOLE SODIUM, VIA: HCPCS

## 2024-01-29 PROCEDURE — 6360000002 HC RX W HCPCS: Performed by: INTERNAL MEDICINE

## 2024-01-29 PROCEDURE — 84480 ASSAY TRIIODOTHYRONINE (T3): CPT

## 2024-01-29 PROCEDURE — 85025 COMPLETE CBC W/AUTO DIFF WBC: CPT

## 2024-01-29 PROCEDURE — 2500000003 HC RX 250 WO HCPCS

## 2024-01-29 PROCEDURE — 83735 ASSAY OF MAGNESIUM: CPT

## 2024-01-29 PROCEDURE — 85018 HEMOGLOBIN: CPT

## 2024-01-29 PROCEDURE — 94640 AIRWAY INHALATION TREATMENT: CPT

## 2024-01-29 RX ORDER — SODIUM CHLORIDE FOR INHALATION 3 %
4 VIAL, NEBULIZER (ML) INHALATION 4 TIMES DAILY
Status: DISCONTINUED | OUTPATIENT
Start: 2024-01-29 | End: 2024-02-01

## 2024-01-29 RX ORDER — CASTOR OIL AND BALSAM, PERU 788; 87 MG/G; MG/G
OINTMENT TOPICAL 2 TIMES DAILY
Status: DISCONTINUED | OUTPATIENT
Start: 2024-01-29 | End: 2024-02-08 | Stop reason: HOSPADM

## 2024-01-29 RX ORDER — SODIUM CHLORIDE, SODIUM LACTATE, POTASSIUM CHLORIDE, AND CALCIUM CHLORIDE .6; .31; .03; .02 G/100ML; G/100ML; G/100ML; G/100ML
500 INJECTION, SOLUTION INTRAVENOUS ONCE
Status: COMPLETED | OUTPATIENT
Start: 2024-01-29 | End: 2024-01-29

## 2024-01-29 RX ORDER — SODIUM CHLORIDE FOR INHALATION 3 %
4 VIAL, NEBULIZER (ML) INHALATION PRN
Status: DISCONTINUED | OUTPATIENT
Start: 2024-01-29 | End: 2024-01-29

## 2024-01-29 RX ORDER — METOPROLOL TARTRATE 1 MG/ML
5 INJECTION, SOLUTION INTRAVENOUS EVERY 6 HOURS PRN
Status: DISCONTINUED | OUTPATIENT
Start: 2024-01-29 | End: 2024-02-01

## 2024-01-29 RX ADMIN — SODIUM CHLORIDE 25 ML: 9 INJECTION, SOLUTION INTRAVENOUS at 07:11

## 2024-01-29 RX ADMIN — HEPARIN SODIUM 1320 UNITS: 1000 INJECTION INTRAVENOUS; SUBCUTANEOUS at 07:22

## 2024-01-29 RX ADMIN — BUDESONIDE 250 MCG: 0.25 INHALANT RESPIRATORY (INHALATION) at 22:00

## 2024-01-29 RX ADMIN — LEVALBUTEROL HYDROCHLORIDE 0.63 MG: 0.63 SOLUTION RESPIRATORY (INHALATION) at 12:20

## 2024-01-29 RX ADMIN — BUDESONIDE 250 MCG: 0.25 INHALANT RESPIRATORY (INHALATION) at 07:41

## 2024-01-29 RX ADMIN — MEROPENEM 500 MG: 500 INJECTION, POWDER, FOR SOLUTION INTRAVENOUS at 07:13

## 2024-01-29 RX ADMIN — VANCOMYCIN HYDROCHLORIDE 750 MG: 10 INJECTION, POWDER, LYOPHILIZED, FOR SOLUTION INTRAVENOUS at 12:14

## 2024-01-29 RX ADMIN — SODIUM PHOSPHATE, MONOBASIC, MONOHYDRATE AND SODIUM PHOSPHATE, DIBASIC, ANHYDROUS 15 MMOL: 142; 276 INJECTION, SOLUTION INTRAVENOUS at 13:42

## 2024-01-29 RX ADMIN — MEROPENEM 1000 MG: 1 INJECTION INTRAVENOUS at 18:12

## 2024-01-29 RX ADMIN — DEXTROSE AND SODIUM CHLORIDE: 5; 900 INJECTION, SOLUTION INTRAVENOUS at 22:04

## 2024-01-29 RX ADMIN — SODIUM CHLORIDE 30 MG/ML INHALATION SOLUTION 4 ML: 30 SOLUTION INHALANT at 22:00

## 2024-01-29 RX ADMIN — SODIUM CHLORIDE, POTASSIUM CHLORIDE, SODIUM LACTATE AND CALCIUM CHLORIDE 500 ML: 600; 310; 30; 20 INJECTION, SOLUTION INTRAVENOUS at 08:22

## 2024-01-29 RX ADMIN — SODIUM CHLORIDE, PRESERVATIVE FREE 40 MG: 5 INJECTION INTRAVENOUS at 22:12

## 2024-01-29 RX ADMIN — AMIODARONE HYDROCHLORIDE 0.5 MG/MIN: 50 INJECTION, SOLUTION INTRAVENOUS at 12:18

## 2024-01-29 RX ADMIN — HEPARIN SODIUM 2640 UNITS: 1000 INJECTION INTRAVENOUS; SUBCUTANEOUS at 15:50

## 2024-01-29 RX ADMIN — SODIUM CHLORIDE 25 ML: 9 INJECTION, SOLUTION INTRAVENOUS at 02:34

## 2024-01-29 RX ADMIN — LEVALBUTEROL HYDROCHLORIDE 0.63 MG: 0.63 SOLUTION RESPIRATORY (INHALATION) at 22:00

## 2024-01-29 RX ADMIN — SODIUM CHLORIDE, PRESERVATIVE FREE 40 MG: 5 INJECTION INTRAVENOUS at 08:16

## 2024-01-29 RX ADMIN — IPRATROPIUM BROMIDE 0.5 MG: 0.5 SOLUTION RESPIRATORY (INHALATION) at 07:41

## 2024-01-29 RX ADMIN — SODIUM CHLORIDE, PRESERVATIVE FREE 10 ML: 5 INJECTION INTRAVENOUS at 08:17

## 2024-01-29 RX ADMIN — SODIUM CHLORIDE 30 MG/ML INHALATION SOLUTION 4 ML: 30 SOLUTION INHALANT at 12:19

## 2024-01-29 RX ADMIN — IPRATROPIUM BROMIDE 0.5 MG: 0.5 SOLUTION RESPIRATORY (INHALATION) at 12:19

## 2024-01-29 RX ADMIN — IPRATROPIUM BROMIDE 0.5 MG: 0.5 SOLUTION RESPIRATORY (INHALATION) at 21:59

## 2024-01-29 RX ADMIN — LEVALBUTEROL HYDROCHLORIDE 0.63 MG: 0.63 SOLUTION RESPIRATORY (INHALATION) at 07:41

## 2024-01-29 RX ADMIN — VANCOMYCIN HYDROCHLORIDE 500 MG: 10 INJECTION, POWDER, LYOPHILIZED, FOR SOLUTION INTRAVENOUS at 02:37

## 2024-01-29 ASSESSMENT — PAIN SCALES - PAIN ASSESSMENT IN ADVANCED DEMENTIA (PAINAD)
BREATHING: 0
BREATHING: 0
TOTALSCORE: 0
NEGVOCALIZATION: 0
TOTALSCORE: 0
BREATHING: 0
NEGVOCALIZATION: 0
BREATHING: 0
BODYLANGUAGE: 0
TOTALSCORE: 0
BREATHING: 0
FACIALEXPRESSION: 0
NEGVOCALIZATION: 0
NEGVOCALIZATION: 0
CONSOLABILITY: 0
CONSOLABILITY: 0
TOTALSCORE: 0
CONSOLABILITY: 0
BODYLANGUAGE: 0
TOTALSCORE: 0
FACIALEXPRESSION: 0
BREATHING: 0
FACIALEXPRESSION: 0
CONSOLABILITY: 0
TOTALSCORE: 0
FACIALEXPRESSION: 0
BODYLANGUAGE: 0
TOTALSCORE: 0
NEGVOCALIZATION: 0
BODYLANGUAGE: 0
CONSOLABILITY: 0
BREATHING: 0
FACIALEXPRESSION: 0
BODYLANGUAGE: 0
FACIALEXPRESSION: 0
FACIALEXPRESSION: 0
TOTALSCORE: 0
FACIALEXPRESSION: 0
CONSOLABILITY: 0
BODYLANGUAGE: 0
BODYLANGUAGE: 0
NEGVOCALIZATION: 0
FACIALEXPRESSION: 0
FACIALEXPRESSION: 0
BODYLANGUAGE: 0
TOTALSCORE: 0
BREATHING: 0
TOTALSCORE: 0
BREATHING: 0
BREATHING: 0
BODYLANGUAGE: 0
NEGVOCALIZATION: 0
BODYLANGUAGE: 0
BODYLANGUAGE: 0
CONSOLABILITY: 0
TOTALSCORE: 0
NEGVOCALIZATION: 0
FACIALEXPRESSION: 0
BREATHING: 0
NEGVOCALIZATION: 0
BODYLANGUAGE: 0
FACIALEXPRESSION: 0
NEGVOCALIZATION: 0
NEGVOCALIZATION: 0
BREATHING: 0
CONSOLABILITY: 0
CONSOLABILITY: 0
NEGVOCALIZATION: 0

## 2024-01-29 ASSESSMENT — PAIN SCALES - WONG BAKER
WONGBAKER_NUMERICALRESPONSE: 4
WONGBAKER_NUMERICALRESPONSE: 0

## 2024-01-29 ASSESSMENT — PAIN SCALES - GENERAL: PAINLEVEL_OUTOF10: 0

## 2024-01-29 NOTE — PLAN OF CARE
Problem: Discharge Planning  Goal: Discharge to home or other facility with appropriate resources  Outcome: Progressing     Problem: Pain  Goal: Verbalizes/displays adequate comfort level or baseline comfort level  Outcome: Progressing     Problem: Safety - Adult  Goal: Free from fall injury  Outcome: Progressing     Problem: Neurosensory - Adult  Goal: Achieves stable or improved neurological status  1/29/2024 0537 by Andrew Ward RN  Outcome: Progressing  1/28/2024 2055 by Andrei Pérez RN  Outcome: Not Progressing  Flowsheets (Taken 1/28/2024 2055)  Achieves stable or improved neurological status:   Assess for and report changes in neurological status   Initiate measures to prevent increased intracranial pressure   Maintain blood pressure and fluid volume within ordered parameters to optimize cerebral perfusion and minimize risk of hemorrhage   Monitor temperature, glucose, and sodium. Initiate appropriate interventions as ordered  Note: Pt has remained disoriented x4 with inability to follow commands. Pt withdraws from pain. Pt tracks staff and family appropriately and is interactive but disoriented.   Goal: Absence of seizures  Outcome: Progressing  Goal: Remains free of injury related to seizures activity  Outcome: Progressing  Goal: Achieves maximal functionality and self care  Outcome: Progressing     Problem: Cardiovascular - Adult  Goal: Maintains optimal cardiac output and hemodynamic stability  1/29/2024 0537 by Andrew Ward RN  Outcome: Progressing  1/28/2024 2055 by Andrei Pérez RN  Outcome: Progressing  Flowsheets (Taken 1/28/2024 2055)  Maintains optimal cardiac output and hemodynamic stability:   Monitor blood pressure and heart rate   Monitor urine output and notify Licensed Independent Practitioner for values outside of normal range   Assess for signs of decreased cardiac output   Administer fluid and/or volume expanders as ordered  Note: Pt converted to normals sinus

## 2024-01-29 NOTE — PROGRESS NOTES
Pt placed on non-rebreather by nightshift RN at start of shift. Rt called to bedside and was placed on 15 L highflow NC.   Pt weaned to 10L. Pt lethargic and not following commands, not appropriate for PO meds at this time, MD notified at bedside.

## 2024-01-29 NOTE — PROGRESS NOTES
01/29/24 1048   Encounter Summary   Encounter Overview/Reason  Spiritual/Emotional Needs   Service Provided For: Patient and family together  (Dtr at bedside)   Referral/Consult From: Nurse  (Andrei Pérez, RN)   Support System Children;Family members   Last Encounter  01/29/24  (Samaritan, prayer, support, ke 1/29/24)   Complexity of Encounter Moderate   Begin Time 1035   End Time  1100   Total Time Calculated 25 min   Spiritual/Emotional needs   Type Spiritual Support   Rituals, Rites and Sacraments   Type Samaritan;Blessings   Assessment/Intervention/Outcome   Assessment Calm;Compromised coping;Peaceful   Intervention Active listening;Discussed belief system/Cheondoism practices/eleanor;Discussed relationship with God;Explored/Affirmed feelings, thoughts, concerns;Sustaining Presence/Ministry of presence   Outcome Acceptance;Encouraged;Engaged in conversation;Expressed feelings of Veronica, Peace and/or Love;Expressed Gratitude     Ace Mckee Chaplain

## 2024-01-29 NOTE — CARE COORDINATION
Cm attempted to speak with daughter at bedside. Pt and daughter sleeping soundly.    5:18 PM   Cm attempted to meet with daughter at bedside. Daughter not in room. Pt unable to answer questions. Will Complete assessment at another time.

## 2024-01-29 NOTE — PROGRESS NOTES
Cardiology Consult Service  Daily Progress Note        Admit Date:  1/28/2024  Primary cardiologist: Dr Rosales, claudia     Reason for Consultation/Chief Complaint: PAFRVR    Subjective:      Zuleima Mir is a 83 y.o. female with a past medical history of smoker, COPD not on oxygen, IBS, HTN, HLP, DM, on chronic benzodiazepines and opioids.     Patient presented on 1/28 with decreased p.o. intake, altered mental status and fatigue with cough over the last few weeks.  She was hypoxic, febrile (Tmax 100.4), severely tachycardic 180s with low normal BP, requiring 15 L of supplemental oxygen for adequate saturation.  Creatinine 2.0, chest x-ray consistent with multifocal pneumonia.  ECG consistent with  bpm, nonspecific changes.  She was admitted for sepsis due to community-acquired pneumonia, NYA, PAF RVR, influenza infection.  She was initially placed on diltiazem drip and was given digoxin 500 mcg IV x 1 along with IV antibiotics.  She received LR 2 L as a bolus and was placed on  mill per hour.  Cardiology was consulted.  Patient subsequently became hypotensive and diltiazem drip was changed to amiodarone drip after discussing the case with me.     Patient currently appears hemodynamically stable.  Telemetry personally reviewed, reveals normal sinus rhythm rates in the 80s.  She is now requiring only 5 L of supplemental oxygen.  Heparin drip was held by primary team due to positive Hemoccult and GI was consulted.  Hemoglobin down to 13.9 from 16.8 (I suspect hemodilution after receiving IV fluids).  Patient appears confused, does not report any complaints.    Interval history:  Patient now requiring increased supplemental oxygen currently on 12 L.  Creatinine recovered now at 0.6 from 2.0 on admission.  She remains on amiodarone and heparin drip.    Objective:     Medications:   vancomycin  750 mg IntraVENous Q18H    meropenem  1,000 mg IntraVENous Q12H    sodium phosphate IVPB (PERIPHERAL line)  15 mmol

## 2024-01-29 NOTE — CONSULTS
(Last 24 hours) at 2024 1005  Last data filed at 2024 0904  Gross per 24 hour   Intake 0 ml   Output 1300 ml   Net -1300 ml     CURRENT PULSE OXIMETRY:  SpO2: 97 %  24HR PULSE OXIMETRY RANGE:  SpO2  Av.3 %  Min: 92 %  Max: 100 %    CONSTITUTIONAL:  awake  NECK:  Supplel  LUNGS:  no increased work of breathing, bilateral rhonchi. No accessory muscle use  CARDIOVASCULAR:  normal S1 and S2, no edema and no JVD  ABDOMEN:  normal bowel sounds, non-distended.  LYMPHADENOPATHY:  no axillary or supraclavicular adenopathy. No cervical adnenopathy  PSYCHIATRIC: Non interactive  MUSCULOSKELETAL: No obvious misalignment or effusion of the joints. No clubbing, cyanosis of the digits.  SKIN:  pallor    DATA:    Old records have been reviewed  CBC with Differential:    Lab Results   Component Value Date/Time    WBC 7.4 2024 05:48 AM    RBC 4.26 2024 05:48 AM    HGB 13.7 2024 05:48 AM    HCT 40.6 2024 05:48 AM     2024 05:48 AM    MCV 95.4 2024 05:48 AM    MCH 32.2 2024 05:48 AM    MCHC 33.8 2024 05:48 AM    RDW 13.8 2024 05:48 AM    BANDSPCT 5 2024 02:50 AM    METASPCT 2 2024 02:50 AM    LYMPHOPCT 5.0 2024 05:48 AM    MONOPCT 7.0 2024 05:48 AM    BASOPCT 0.0 2024 05:48 AM    MONOSABS 0.5 2024 05:48 AM    LYMPHSABS 0.4 2024 05:48 AM    EOSABS 0.0 2024 05:48 AM    BASOSABS 0.0 2024 05:48 AM     BMP:    Lab Results   Component Value Date/Time     2024 05:48 AM    K 4.2 2024 05:48 AM    K 3.6 2024 02:50 AM     2024 05:48 AM    CO2 21 2024 05:48 AM    BUN 47 2024 05:48 AM    CREATININE 0.6 2024 05:48 AM    CALCIUM 9.3 2024 05:48 AM    LABGLOM >60 2024 05:48 AM    GLUCOSE 77 2024 05:48 AM     Hepatic Function Panel:    Lab Results   Component Value Date/Time    ALKPHOS 43 2024 05:48 AM    ALT 36 2024 05:48 AM    AST 76  01/29/2024 05:48 AM    PROT 5.8 01/29/2024 05:48 AM    BILITOT 0.7 01/29/2024 05:48 AM    BILIDIR 0.3 01/29/2024 05:48 AM    IBILI 0.4 01/29/2024 05:48 AM     ABG:  No results found for: \"SQT3OGX\", \"BEART\", \"V4GWRYGK\", \"PHART\", \"THGBART\", \"EKK4ZIN\", \"PO2ART\", \"BVE0XPF\"    Cultures:   Blood Culture:  NGTD  Sputum Culture:    Strep pneumo, legionella negative.   Influenza A positive   Influenza B Negative  COVID Negative.     Radiology Review:  All pertinent images / reports were reviewed as a part of this visit.  CXR reveals the following:    Extensive left lower lung and patchy right lower lung consolidation most compatible with multifocal pneumonia.     CTPE 1/28/24  \"1. No evidence of any pulmonary thromboembolus, aneurysm or dissection.      2. Mucous plugging and bronchial thickening and bronchiectasis noted most severe in the lower lobes, left greater than right but also visualized in the right inferior upper lobe.\"     PFTs:  2/17/20 spirometry: Within normal limits. FEV1 1.91 L which is 105% predicted. FVC 2.32 L which is 97% of predicted. FEV1/FVC 86%    Echo:  Pending at the time.     Doppler Studies:    Assessment/Plan     Acute hypoxic respiratory failure likely 2/2 superimposed bacterial on viral infection.  Mucous plugging of the BL lower lobes likely showing bronchiectasis.   Subsegmental Pulmonary emboli on CTPE. Patient already on heparin gtt for A fib RVR.   Active smoker with 1 PPD smoking history.   History of COPD.   Malnutritioned.       83 y.o F with Pmhx of COPD and an active smoker, admitted to the hospital for lethargy and weakness likely secondary to superimposed bacterial infection on the viral PNA. She is on broad spectrum abx with Vancomycin and Meropenem.     CT Scan of the chest also shows subsegmental pulmonary emboli that could be contributing to the hypoxia. Mucous plugging noted with appearance of bronchiectasis at the bases. Her oxygen requirement has overall improved from 10 L to

## 2024-01-29 NOTE — PLAN OF CARE
Problem: SLP Adult - Impaired Swallowing  Goal: By Discharge: Advance to least restrictive diet without signs or symptoms of aspiration for planned discharge setting.  See evaluation for individualized goals.  1/29/2024 0938 by Kiley Shankar, SLP  Outcome: Progressing  1/29/2024 0938 by Kiley Shankar, SLP  Outcome: Progressing       Clinical swallow evaluation completed this date.   Please see EMR for full report.        Kiley Shankar MA, CCC-SLP  [id number]  Speech-Language Pathologist

## 2024-01-29 NOTE — CONSULTS
Consultation Note    Patient Name: Zuleima Mir  : 1940  Age: 83 y.o.     Admitting Physician: Candelario Awad MD   Date of Admission: 2024  2:29 AM   Primary Care Physician: Van Neal        Zuleima Mir is being seen at the request of Candelario Awad MD for possible GI bleed.    History of Present Illness:  83-year-old female with history of COPD, hypertension, diabetes chronic benzodiazepine and opioids admitted with altered mental status, fatigue cough and shortness of breath.  On admission patient was hypoxic febrile tachycardic requiring 15 L of supplemental oxygen.    CT abdomen did not show any evidence of GI bleeding.  Extensive atherosclerosis, prior cholecystectomy and extensive bilateral pulmonary consolidations concerning for pneumonia  We are consulted for drop in hemoglobin, guaiac positive stool      GI History:  EGD/EUS     Past Medical History:  Past Medical History:   Diagnosis Date    Allergic rhinitis     Emphysema (subcutaneous) (surgical) resulting from a procedure     Hyperlipidemia     Hypertension     IBS (irritable bowel syndrome)     Pancreatic cyst     Type II or unspecified type diabetes mellitus without mention of complication, not stated as uncontrolled     diet controlled    Wears hearing aid in both ears     Wears partial dentures     lower        Past Surgical History:  Past Surgical History:   Procedure Laterality Date    BREAST SURGERY      biopsy    CHOLECYSTECTOMY      COLONOSCOPY      HYSTERECTOMY (CERVIX STATUS UNKNOWN)      UPPER GASTROINTESTINAL ENDOSCOPY N/A 2020    ESOPHAGOGASTRODUODENOSCOPY WITH ENDOSCOPIC ULTRASOUND AND MONITORED ANESTHESIA CARE performed by Fawad Cabrera MD at [facility] ENDOSCOPY    UPPER GASTROINTESTINAL ENDOSCOPY  2020    EGD BIOPSY performed by Fawad Cabrera MD at [facility] ENDOSCOPY        Historical Medications:  Prior to Visit Medications    Medication Sig Taking? Authorizing Provider   ALPRAZolam (XANAX) 0.25 MG  GI bleeding.  3.  Extensive atherosclerosis of the abdominal aorta with infrarenal abdominal aortic aneurysm measuring up to 4.3 cm.  4.  Prior cholecystectomy. Dilated intra and extrahepatic bile ducts and prominent pancreatic duct. Correlate with enzyme markers and ERCP if clinically indicated.  5.  Extensive bilateral pulmonary consolidations concerning for pneumonia. She separately dictated CT chest for full findings.    Electronically signed by Javier Browne MD  CT CHEST PULMONARY EMBOLISM W CONTRAST  Narrative: CT PULMONARY ANGIOGRAPHY OF THE CHEST on 1/28/2024    History: Chest pain, hypoxia and tachycardia, 83-year-old female    PROCEDURE: 100 ml of Omnipaque 350 contrast material injected as a bolus with subsequent thin 1.25 mm sections with MIP rendered reconstructions, reviewed in 3 dimensions on a separate computerized workstation. Radiation reducing techniques and iterative   reconstruction utilized    FINDINGS:     Noncontrast views initially demonstrate bibasilar lung consolidations, greater in the left lung base than the right representing consolidation and/or atelectasis    CT pulmonary angiography demonstrates normal widely patent flow in the pulmonary arteries without evidence of pulmonary thromboemboli.     There is no sign of the aortic aneurysm or dissection. There is atherosclerotic disease of the aorta as well as the coronary arteries    There is patchy groundglass changes noted in the upper lobes inferiorly perihilar regions of the right middle lobe and bilateral lower lobes with bronchial thickening and bronchial dilation with areas of mucous plugging in the right medial lung base as   seen on image 148 series 4 and mucous plugging seen in the left lower lobe image 154.    More dense consolidation noted left lower lobe and right medial lower lobe regions..  Impression: 1. No evidence of any pulmonary thromboembolus, aneurysm or dissection.     2. Mucous plugging and bronchial thickening and

## 2024-01-29 NOTE — RT PROTOCOL NOTE
RT Nebulizer Bronchodilator Protocol Note    There is a bronchodilator order in the chart from a provider indicating to follow the RT Bronchodilator Protocol and there is an “Initiate RT Bronchodilator Protocol” order as well (see protocol at bottom of note).    CXR Findings:  XR CHEST PORTABLE    Result Date: 1/28/2024  Extensive left lower lung and patchy right lower lung consolidation most compatible with multifocal pneumonia. Normal cardiomediastinal silhouette. Electronically signed by Pankaj Andrade MD      The findings from the last RT Protocol Assessment were as follows:  Smoking: Chronic pulmonary disease  Respiratory Pattern: Dyspnea on exertion or RR 21-25 bpm  Breath Sounds: Severe inspiratory and expiratory wheezing or severely diminished  Cough: Strong, productive  Indication for Bronchodilator Therapy:    Bronchodilator Assessment Score: 13    Aerosolized bronchodilator medication orders have been revised according to the RT Nebulizer Bronchodilator Protocol below.    Respiratory Therapist to perform RT Therapy Protocol Assessment initially then follow the protocol.  Repeat RT Therapy Protocol Assessment PRN for score 0-3 or on second treatment, BID, and PRN for scores above 3.    No Indications - adjust the frequency to every 6 hours PRN wheezing or bronchospasm, if no treatments needed after 48 hours then discontinue using Per Protocol order mode.     If indication present, adjust the RT bronchodilator orders based on the Bronchodilator Assessment Score as indicated below.  If a patient is on this medication at home then do not decrease Frequency below that used at home.    0-3 - enter or revise RT bronchodilator order(s) to equivalent RT Bronchodilator order with Frequency of every 4 hours PRN for wheezing or increased work of breathing using Per Protocol order mode.       4-6 - enter or revise RT Bronchodilator order(s) to two equivalent RT bronchodilator orders with one order with BID Frequency

## 2024-01-29 NOTE — PROGRESS NOTES
Attempted to give pt breathing treatment. Pt immediately ripped off mask and refused to wear mask for the treatment. Pt also refused mouthpiece. I explained how the nebs would help her and she still refused. Nurse informed.

## 2024-01-29 NOTE — PROGRESS NOTES
Pharmacy Note - Extended Infusion Beta-Lactam Adjustment    Meropenem ordered for treatment of sepsis, PNA. Per [facility] Extended Infusion Beta-Lactam Policy, Meropenem will be changed to 1000 mg IV EI q12h.     Estimated Creatinine Clearance: Estimated Creatinine Clearance: 47 mL/min (based on SCr of 0.6 mg/dL).  Dialysis Status, NYA, CKD: NYA, improving  BMI: Body mass index is 17.58 kg/m².    Rationale for Adjustment: Agent is renally eliminated and demonstrates time-dependent effect on bacterial eradication. Extended-infusion dosing strategy aims to enhance microbiologic and clinical efficacy.    Pharmacy will continue to monitor renal function and adjust dose as necessary.      Please call with questions--  Annemarie Orozco PharmD, BCPS  Wireless: [phone]   1/29/2024 9:44 AM

## 2024-01-29 NOTE — PROGRESS NOTES
Speech Language Pathology  Facility/Department:[facility]  Clinical Swallow  Evaluation  Name: Zuleima Mir  : 1940  MRN: [id number]                                                         Patient Diagnosis(es):   Patient Active Problem List    Diagnosis Date Noted    Severe sepsis (HCC) 2024       Past Medical History:   Diagnosis Date    Allergic rhinitis     Emphysema (subcutaneous) (surgical) resulting from a procedure     Hyperlipidemia     Hypertension     IBS (irritable bowel syndrome)     Pancreatic cyst     Type II or unspecified type diabetes mellitus without mention of complication, not stated as uncontrolled     diet controlled    Wears hearing aid in both ears     Wears partial dentures     lower     Past Surgical History:   Procedure Laterality Date    BREAST SURGERY      biopsy    CHOLECYSTECTOMY      COLONOSCOPY      HYSTERECTOMY (CERVIX STATUS UNKNOWN)      UPPER GASTROINTESTINAL ENDOSCOPY N/A 2020    ESOPHAGOGASTRODUODENOSCOPY WITH ENDOSCOPIC ULTRASOUND AND MONITORED ANESTHESIA CARE performed by Fawad Cabrera MD at [facility] ENDOSCOPY    UPPER GASTROINTESTINAL ENDOSCOPY  2020    EGD BIOPSY performed by Fawad Cabrera MD at [facility] ENDOSCOPY       Reason for Referral:  Zuleima Mir  was referred for a Speech Therapy evaluation to assess swallow function and/or communication.    History of Present Illness  Per MD notes:  \"My assessment reveals an 83-year-old female lifelong smoker with significant past history of COPD not on oxygen, diabetes type 2, hypertension hyperlipidemia, on chronic benzos and opioids who was brought to the emergency room unresponsive, hypoxic and tachycardic in the 170s.  In emergency room she had a low-grade temperature of 37.8, initial blood pressure was 102/52, heart rate was 180 irregular, respiratory rate 20 was 95% on 15 L nonrebreather.  Preliminary workup was significant for a BUN of 195 with a creatinine of 2 and anion gap of 25

## 2024-01-29 NOTE — PLAN OF CARE
Problem: Neurosensory - Adult  Goal: Achieves stable or improved neurological status  Outcome: Not Progressing  Flowsheets (Taken 1/28/2024 2055)  Achieves stable or improved neurological status:   Assess for and report changes in neurological status   Initiate measures to prevent increased intracranial pressure   Maintain blood pressure and fluid volume within ordered parameters to optimize cerebral perfusion and minimize risk of hemorrhage   Monitor temperature, glucose, and sodium. Initiate appropriate interventions as ordered  Note: Pt has remained disoriented x4 with inability to follow commands. Pt withdraws from pain. Pt tracks staff and family appropriately and is interactive but disoriented.      Problem: Cardiovascular - Adult  Goal: Maintains optimal cardiac output and hemodynamic stability  Outcome: Progressing  Flowsheets (Taken 1/28/2024 2055)  Maintains optimal cardiac output and hemodynamic stability:   Monitor blood pressure and heart rate   Monitor urine output and notify Licensed Independent Practitioner for values outside of normal range   Assess for signs of decreased cardiac output   Administer fluid and/or volume expanders as ordered  Note: Pt converted to normals sinus rhythm this shift. Pt remains on amiodarone gtt per MD order.      Problem: Respiratory - Adult  Goal: Achieves optimal ventilation and oxygenation  Outcome: Progressing  Flowsheets (Taken 1/28/2024 2055)  Achieves optimal ventilation and oxygenation:   Assess for changes in respiratory status   Assess for changes in mentation and behavior   Position to facilitate oxygenation and minimize respiratory effort   Oxygen supplementation based on oxygen saturation or arterial blood gases  Note: Pt's O2 requirements titrated from 10L HFNC to 7L HFNC, pt has maintained SpO2 > 92% on 7L HFNC. Pt was intially tachypneic and dyspneic at rest. However pt has progressively become unlabored with no signs of shortness of breath and

## 2024-01-29 NOTE — PROGRESS NOTES
Clinical Pharmacy Progress Note    Vancomycin - Management by Pharmacy    Consult Date(s): 1/28/24  Consulting Provider(s): Ara Haley MD     Assessment / Plan  Severe sepsis, PNA - Vancomycin  Concurrent Antimicrobials: Meropenem  Day of Vanc Therapy / Ordered Duration: 2 of 7  Current Dosing Method: Intermittent Dosing by Levels?Bayesian AUC Dosing   Therapeutic Goal: Trough ~ 15 mg/L?-600   Current Dose / Plan:   Admitted with NYA which has improved. SCr 2?1.4?0.6 overnight. UOP 1.6 mg/kg/hr.  Given improvement in renal function - will start on scheduled dosing today.  Last dose given was 500mg IV x1 overnight.  Level this AM = 13.6 mg/L - drawn ~3h after prior dose. Difficult to interpret level as likely still in distributive phase.  Will schedule 750mg IV q18h  Regimen predicts AUC of 466 mg/L*h  Level ordered for tomorrow AM - Tues 1/30 to confirm kinetic estimates.  Will continue to monitor clinical condition and make adjustments to regimen as appropriate.    Please call with questions--  Annemarie Orozco, PharmD, [facility]  Wireless: [phone]   1/29/2024 9:05 AM        Interval update:  SCr has improved from yesterday (SCr 2?1.4?0.6). UOP 1.6 mg/kg/hr since admission.    Subjective/Objective:   Zuleima Mir is a 83 y.o. female with a PMHx significant for COPD, T2DM, HTN, who presented to ED with AMS. On presentation, pt was noted to be in AFib, CXR showed extensive LLL and patchy RLL consolidation consistent with PNA. Admitted with sepsis, PNA, influenza and NYA.    Pharmacy is consulted to manage vancomycin.    Ht Readings from Last 1 Encounters:   01/28/24 1.575 m (5' 2\")     Wt Readings from Last 1 Encounters:   01/29/24 43.6 kg (96 lb 1.9 oz)     Current & Prior Antimicrobial Regimen(s):  Meropenem (1/28-current)  Vancomycin - Pharmacy to dose  750mg IV q18h (1/29-current)    Vancomycin Level(s) / Doses:  Date Time Dose Type of Level / Level Interpretation   1/29 05:48 500mg IV q24h Random = 13.6 mg/L

## 2024-01-29 NOTE — PROGRESS NOTES
Pt has removed nasal cannula multiple times, desatting to mid 70s-low 80s, requiring increased o2 to recover. Pt placed on 10 L HF satting 90%. Pt is not redirectable at this time.

## 2024-01-29 NOTE — PLAN OF CARE
Problem: Safety - Adult  Goal: Free from fall injury  1/29/2024 1046 by Jody Silva RN  Outcome: Progressing  Flowsheets (Taken 1/29/2024 1046)  Free From Fall Injury:   Based on caregiver fall risk screen, instruct family/caregiver to ask for assistance with transferring infant if caregiver noted to have fall risk factors   Instruct family/caregiver on patient safety  Note: Call light within reach, bed alarm on, bed in lowest position     Problem: Neurosensory - Adult  Goal: Achieves stable or improved neurological status  1/29/2024 1046 by Jody Silva RN  Outcome: Progressing  Flowsheets (Taken 1/29/2024 1046)  Achieves stable or improved neurological status:   Assess for and report changes in neurological status   Initiate measures to prevent increased intracranial pressure   Maintain blood pressure and fluid volume within ordered parameters to optimize cerebral perfusion and minimize risk of hemorrhage     Problem: Cardiovascular - Adult  Goal: Maintains optimal cardiac output and hemodynamic stability  1/29/2024 1046 by Jody Silva RN  Outcome: Progressing  Flowsheets (Taken 1/29/2024 1046)  Maintains optimal cardiac output and hemodynamic stability:   Monitor blood pressure and heart rate   Monitor urine output and notify Licensed Independent Practitioner for values outside of normal range   Assess for signs of decreased cardiac output  Note: Amio gtt infusing at this time per cardiology     Problem: Respiratory - Adult  Goal: Achieves optimal ventilation and oxygenation  1/29/2024 1046 by Jody Silva RN  Outcome: Progressing  Flowsheets (Taken 1/29/2024 1046)  Achieves optimal ventilation and oxygenation:   Assess for changes in respiratory status   Assess for changes in mentation and behavior   Position to facilitate oxygenation and minimize respiratory effort  Note: Pt on 8 L HF NC

## 2024-01-30 ENCOUNTER — APPOINTMENT (OUTPATIENT)
Dept: VASCULAR LAB | Age: 84
DRG: 871 | End: 2024-01-30
Payer: MEDICARE

## 2024-01-30 ENCOUNTER — APPOINTMENT (OUTPATIENT)
Dept: GENERAL RADIOLOGY | Age: 84
DRG: 871 | End: 2024-01-30
Payer: MEDICARE

## 2024-01-30 LAB
ALBUMIN SERPL-MCNC: 1.9 G/DL (ref 3.4–5)
ALBUMIN SERPL-MCNC: 1.9 G/DL (ref 3.4–5)
ALP SERPL-CCNC: 44 U/L (ref 40–129)
ALT SERPL-CCNC: 37 U/L (ref 10–40)
ANION GAP SERPL CALCULATED.3IONS-SCNC: 12 MMOL/L (ref 3–16)
ANION GAP SERPL CALCULATED.3IONS-SCNC: 12 MMOL/L (ref 3–16)
ANTI-XA UNFRAC HEPARIN: 0.35 IU/ML (ref 0.3–0.7)
ANTI-XA UNFRAC HEPARIN: <0.1 IU/ML (ref 0.3–0.7)
ANTI-XA UNFRAC HEPARIN: >1.1 IU/ML (ref 0.3–0.7)
AST SERPL-CCNC: 58 U/L (ref 15–37)
BILIRUB DIRECT SERPL-MCNC: 0.3 MG/DL (ref 0–0.3)
BILIRUB INDIRECT SERPL-MCNC: 0.4 MG/DL (ref 0–1)
BILIRUB SERPL-MCNC: 0.7 MG/DL (ref 0–1)
BUN SERPL-MCNC: 19 MG/DL (ref 7–20)
BUN SERPL-MCNC: 23 MG/DL (ref 7–20)
CALCIUM SERPL-MCNC: 8.3 MG/DL (ref 8.3–10.6)
CALCIUM SERPL-MCNC: 8.7 MG/DL (ref 8.3–10.6)
CHLORIDE SERPL-SCNC: 110 MMOL/L (ref 99–110)
CHLORIDE SERPL-SCNC: 113 MMOL/L (ref 99–110)
CO2 SERPL-SCNC: 23 MMOL/L (ref 21–32)
CO2 SERPL-SCNC: 25 MMOL/L (ref 21–32)
CREAT SERPL-MCNC: 0.5 MG/DL (ref 0.6–1.2)
CREAT SERPL-MCNC: <0.5 MG/DL (ref 0.6–1.2)
GFR SERPLBLD CREATININE-BSD FMLA CKD-EPI: >60 ML/MIN/{1.73_M2}
GFR SERPLBLD CREATININE-BSD FMLA CKD-EPI: >60 ML/MIN/{1.73_M2}
GLUCOSE SERPL-MCNC: 132 MG/DL (ref 70–99)
GLUCOSE SERPL-MCNC: 158 MG/DL (ref 70–99)
HCT VFR BLD AUTO: 35.9 % (ref 36–48)
HCT VFR BLD AUTO: 38.6 % (ref 36–48)
HCT VFR BLD AUTO: 53.3 % (ref 36–48)
HGB BLD-MCNC: 12.2 G/DL (ref 12–16)
HGB BLD-MCNC: 12.7 G/DL (ref 12–16)
HGB BLD-MCNC: 17.5 G/DL (ref 12–16)
LACTATE BLDV-SCNC: 1.9 MMOL/L (ref 0.4–2)
LACTATE BLDV-SCNC: 2 MMOL/L (ref 0.4–2)
MAGNESIUM SERPL-MCNC: 1.6 MG/DL (ref 1.8–2.4)
MAGNESIUM SERPL-MCNC: 1.7 MG/DL (ref 1.8–2.4)
NT-PROBNP SERPL-MCNC: 4942 PG/ML (ref 0–449)
PHOSPHATE SERPL-MCNC: 1.8 MG/DL (ref 2.5–4.9)
PHOSPHATE SERPL-MCNC: 3.1 MG/DL (ref 2.5–4.9)
POTASSIUM SERPL-SCNC: 2.8 MMOL/L (ref 3.5–5.1)
POTASSIUM SERPL-SCNC: 3.1 MMOL/L (ref 3.5–5.1)
PROT SERPL-MCNC: 5.4 G/DL (ref 6.4–8.2)
SODIUM SERPL-SCNC: 147 MMOL/L (ref 136–145)
SODIUM SERPL-SCNC: 148 MMOL/L (ref 136–145)
VANCOMYCIN SERPL-MCNC: 20.5 UG/ML

## 2024-01-30 PROCEDURE — 99233 SBSQ HOSP IP/OBS HIGH 50: CPT | Performed by: INTERNAL MEDICINE

## 2024-01-30 PROCEDURE — 83735 ASSAY OF MAGNESIUM: CPT

## 2024-01-30 PROCEDURE — 6360000002 HC RX W HCPCS

## 2024-01-30 PROCEDURE — 2580000003 HC RX 258

## 2024-01-30 PROCEDURE — 99291 CRITICAL CARE FIRST HOUR: CPT | Performed by: INTERNAL MEDICINE

## 2024-01-30 PROCEDURE — 83880 ASSAY OF NATRIURETIC PEPTIDE: CPT

## 2024-01-30 PROCEDURE — 36415 COLL VENOUS BLD VENIPUNCTURE: CPT

## 2024-01-30 PROCEDURE — 85014 HEMATOCRIT: CPT

## 2024-01-30 PROCEDURE — 2500000003 HC RX 250 WO HCPCS

## 2024-01-30 PROCEDURE — 2700000000 HC OXYGEN THERAPY PER DAY

## 2024-01-30 PROCEDURE — 93970 EXTREMITY STUDY: CPT

## 2024-01-30 PROCEDURE — 80069 RENAL FUNCTION PANEL: CPT

## 2024-01-30 PROCEDURE — C9113 INJ PANTOPRAZOLE SODIUM, VIA: HCPCS

## 2024-01-30 PROCEDURE — 83605 ASSAY OF LACTIC ACID: CPT

## 2024-01-30 PROCEDURE — 2060000000 HC ICU INTERMEDIATE R&B

## 2024-01-30 PROCEDURE — 6370000000 HC RX 637 (ALT 250 FOR IP)

## 2024-01-30 PROCEDURE — 6360000002 HC RX W HCPCS: Performed by: INTERNAL MEDICINE

## 2024-01-30 PROCEDURE — 85018 HEMOGLOBIN: CPT

## 2024-01-30 PROCEDURE — 93306 TTE W/DOPPLER COMPLETE: CPT

## 2024-01-30 PROCEDURE — 94761 N-INVAS EAR/PLS OXIMETRY MLT: CPT

## 2024-01-30 PROCEDURE — 94640 AIRWAY INHALATION TREATMENT: CPT

## 2024-01-30 PROCEDURE — A4216 STERILE WATER/SALINE, 10 ML: HCPCS

## 2024-01-30 PROCEDURE — 85520 HEPARIN ASSAY: CPT

## 2024-01-30 PROCEDURE — 71045 X-RAY EXAM CHEST 1 VIEW: CPT

## 2024-01-30 PROCEDURE — 80202 ASSAY OF VANCOMYCIN: CPT

## 2024-01-30 PROCEDURE — 80076 HEPATIC FUNCTION PANEL: CPT

## 2024-01-30 RX ORDER — SODIUM CHLORIDE, SODIUM LACTATE, POTASSIUM CHLORIDE, CALCIUM CHLORIDE 600; 310; 30; 20 MG/100ML; MG/100ML; MG/100ML; MG/100ML
INJECTION, SOLUTION INTRAVENOUS CONTINUOUS
Status: DISCONTINUED | OUTPATIENT
Start: 2024-01-30 | End: 2024-01-30

## 2024-01-30 RX ORDER — POTASSIUM CHLORIDE 20 MEQ/1
20 TABLET, EXTENDED RELEASE ORAL ONCE
Status: DISCONTINUED | OUTPATIENT
Start: 2024-01-30 | End: 2024-01-30

## 2024-01-30 RX ORDER — MAGNESIUM SULFATE IN WATER 40 MG/ML
4000 INJECTION, SOLUTION INTRAVENOUS ONCE
Status: COMPLETED | OUTPATIENT
Start: 2024-01-30 | End: 2024-01-31

## 2024-01-30 RX ORDER — POTASSIUM CHLORIDE 7.45 MG/ML
10 INJECTION INTRAVENOUS
Status: DISPENSED | OUTPATIENT
Start: 2024-01-30 | End: 2024-01-31

## 2024-01-30 RX ORDER — POTASSIUM CHLORIDE 7.45 MG/ML
10 INJECTION INTRAVENOUS
Status: COMPLETED | OUTPATIENT
Start: 2024-01-30 | End: 2024-01-30

## 2024-01-30 RX ORDER — FUROSEMIDE 10 MG/ML
40 INJECTION INTRAMUSCULAR; INTRAVENOUS ONCE
Status: COMPLETED | OUTPATIENT
Start: 2024-01-30 | End: 2024-01-30

## 2024-01-30 RX ORDER — DEXTROSE MONOHYDRATE 50 MG/ML
INJECTION, SOLUTION INTRAVENOUS CONTINUOUS
Status: DISCONTINUED | OUTPATIENT
Start: 2024-01-30 | End: 2024-01-30

## 2024-01-30 RX ORDER — ACETYLCYSTEINE 200 MG/ML
600 SOLUTION ORAL; RESPIRATORY (INHALATION)
Status: DISCONTINUED | OUTPATIENT
Start: 2024-01-30 | End: 2024-01-31

## 2024-01-30 RX ADMIN — SODIUM CHLORIDE, PRESERVATIVE FREE 40 MG: 5 INJECTION INTRAVENOUS at 20:21

## 2024-01-30 RX ADMIN — METOPROLOL TARTRATE 5 MG: 1 INJECTION, SOLUTION INTRAVENOUS at 23:37

## 2024-01-30 RX ADMIN — BUDESONIDE 250 MCG: 0.25 INHALANT RESPIRATORY (INHALATION) at 21:37

## 2024-01-30 RX ADMIN — LEVALBUTEROL HYDROCHLORIDE 0.63 MG: 0.63 SOLUTION RESPIRATORY (INHALATION) at 09:55

## 2024-01-30 RX ADMIN — IPRATROPIUM BROMIDE 0.5 MG: 0.5 SOLUTION RESPIRATORY (INHALATION) at 09:53

## 2024-01-30 RX ADMIN — POTASSIUM CHLORIDE 10 MEQ: 10 INJECTION, SOLUTION INTRAVENOUS at 12:31

## 2024-01-30 RX ADMIN — SODIUM CHLORIDE 25 ML: 9 INJECTION, SOLUTION INTRAVENOUS at 21:01

## 2024-01-30 RX ADMIN — POTASSIUM CHLORIDE 10 MEQ: 10 INJECTION, SOLUTION INTRAVENOUS at 13:34

## 2024-01-30 RX ADMIN — POTASSIUM CHLORIDE: 149 INJECTION, SOLUTION, CONCENTRATE INTRAVENOUS at 10:30

## 2024-01-30 RX ADMIN — Medication: at 08:20

## 2024-01-30 RX ADMIN — ACETYLCYSTEINE 600 MG: 200 INHALANT RESPIRATORY (INHALATION) at 21:36

## 2024-01-30 RX ADMIN — IPRATROPIUM BROMIDE 0.5 MG: 0.5 SOLUTION RESPIRATORY (INHALATION) at 21:36

## 2024-01-30 RX ADMIN — SODIUM CHLORIDE 30 MG/ML INHALATION SOLUTION 4 ML: 30 SOLUTION INHALANT at 12:43

## 2024-01-30 RX ADMIN — Medication: at 04:38

## 2024-01-30 RX ADMIN — VANCOMYCIN HYDROCHLORIDE 750 MG: 10 INJECTION, POWDER, LYOPHILIZED, FOR SOLUTION INTRAVENOUS at 18:41

## 2024-01-30 RX ADMIN — BUDESONIDE 250 MCG: 0.25 INHALANT RESPIRATORY (INHALATION) at 09:53

## 2024-01-30 RX ADMIN — LEVALBUTEROL HYDROCHLORIDE 0.63 MG: 0.63 SOLUTION RESPIRATORY (INHALATION) at 12:40

## 2024-01-30 RX ADMIN — SODIUM CHLORIDE 25 ML: 9 INJECTION, SOLUTION INTRAVENOUS at 05:57

## 2024-01-30 RX ADMIN — LEVALBUTEROL HYDROCHLORIDE 0.63 MG: 0.63 SOLUTION RESPIRATORY (INHALATION) at 21:37

## 2024-01-30 RX ADMIN — MAGNESIUM SULFATE IN WATER FOR 4000 MG: 40 INJECTION INTRAVENOUS at 20:35

## 2024-01-30 RX ADMIN — SODIUM CHLORIDE, PRESERVATIVE FREE 10 ML: 5 INJECTION INTRAVENOUS at 20:24

## 2024-01-30 RX ADMIN — HEPARIN SODIUM 15 UNITS/KG/HR: 10000 INJECTION, SOLUTION INTRAVENOUS at 16:18

## 2024-01-30 RX ADMIN — POTASSIUM CHLORIDE 10 MEQ: 10 INJECTION, SOLUTION INTRAVENOUS at 23:29

## 2024-01-30 RX ADMIN — FUROSEMIDE 40 MG: 10 INJECTION, SOLUTION INTRAMUSCULAR; INTRAVENOUS at 16:00

## 2024-01-30 RX ADMIN — SODIUM CHLORIDE 30 MG/ML INHALATION SOLUTION 4 ML: 30 SOLUTION INHALANT at 09:53

## 2024-01-30 RX ADMIN — AMIODARONE HYDROCHLORIDE 0.5 MG/MIN: 50 INJECTION, SOLUTION INTRAVENOUS at 04:54

## 2024-01-30 RX ADMIN — HEPARIN SODIUM 18 UNITS/KG/HR: 10000 INJECTION, SOLUTION INTRAVENOUS at 04:28

## 2024-01-30 RX ADMIN — MEROPENEM 1000 MG: 1 INJECTION INTRAVENOUS at 04:33

## 2024-01-30 RX ADMIN — SODIUM CHLORIDE 25 ML: 9 INJECTION, SOLUTION INTRAVENOUS at 04:29

## 2024-01-30 RX ADMIN — MEROPENEM 1000 MG: 1 INJECTION INTRAVENOUS at 21:02

## 2024-01-30 RX ADMIN — SODIUM CHLORIDE 30 MG/ML INHALATION SOLUTION 4 ML: 30 SOLUTION INHALANT at 16:42

## 2024-01-30 RX ADMIN — POTASSIUM CHLORIDE: 2 INJECTION, SOLUTION, CONCENTRATE INTRAVENOUS at 22:25

## 2024-01-30 RX ADMIN — LEVALBUTEROL HYDROCHLORIDE 0.63 MG: 0.63 SOLUTION RESPIRATORY (INHALATION) at 16:42

## 2024-01-30 RX ADMIN — SODIUM CHLORIDE 25 ML: 9 INJECTION, SOLUTION INTRAVENOUS at 23:28

## 2024-01-30 RX ADMIN — MEROPENEM 1000 MG: 1 INJECTION INTRAVENOUS at 12:27

## 2024-01-30 RX ADMIN — AMIODARONE HYDROCHLORIDE 0.5 MG/MIN: 50 INJECTION, SOLUTION INTRAVENOUS at 20:28

## 2024-01-30 RX ADMIN — VANCOMYCIN HYDROCHLORIDE 750 MG: 10 INJECTION, POWDER, LYOPHILIZED, FOR SOLUTION INTRAVENOUS at 05:59

## 2024-01-30 RX ADMIN — IPRATROPIUM BROMIDE 0.5 MG: 0.5 SOLUTION RESPIRATORY (INHALATION) at 16:42

## 2024-01-30 RX ADMIN — SODIUM CHLORIDE, PRESERVATIVE FREE 40 MG: 5 INJECTION INTRAVENOUS at 08:25

## 2024-01-30 RX ADMIN — IPRATROPIUM BROMIDE 0.5 MG: 0.5 SOLUTION RESPIRATORY (INHALATION) at 12:41

## 2024-01-30 RX ADMIN — POTASSIUM PHOSPHATE, MONOBASIC AND POTASSIUM PHOSPHATE, DIBASIC 20 MMOL: 224; 236 INJECTION, SOLUTION, CONCENTRATE INTRAVENOUS at 14:45

## 2024-01-30 ASSESSMENT — PAIN SCALES - PAIN ASSESSMENT IN ADVANCED DEMENTIA (PAINAD)
FACIALEXPRESSION: 0
NEGVOCALIZATION: 0
BODYLANGUAGE: 0
BREATHING: 0
NEGVOCALIZATION: 0
CONSOLABILITY: 0
BREATHING: 0
BODYLANGUAGE: 0
BODYLANGUAGE: 0
BREATHING: 0
BREATHING: 0
FACIALEXPRESSION: 0
BREATHING: 0
FACIALEXPRESSION: 0
BREATHING: 0
NEGVOCALIZATION: 0
BREATHING: 0
BODYLANGUAGE: 0
BREATHING: 0
CONSOLABILITY: 0
TOTALSCORE: 0
TOTALSCORE: 0
FACIALEXPRESSION: 0
TOTALSCORE: 0
BODYLANGUAGE: 0
CONSOLABILITY: 0
FACIALEXPRESSION: 0
FACIALEXPRESSION: 0
TOTALSCORE: 0
FACIALEXPRESSION: 0
BREATHING: 0
TOTALSCORE: 0
BODYLANGUAGE: 0
TOTALSCORE: 0
NEGVOCALIZATION: 0
FACIALEXPRESSION: 0
CONSOLABILITY: 0
NEGVOCALIZATION: 0
CONSOLABILITY: 0
FACIALEXPRESSION: 0
NEGVOCALIZATION: 0
BREATHING: 0
NEGVOCALIZATION: 0
NEGVOCALIZATION: 0
CONSOLABILITY: 0
BREATHING: 0
BODYLANGUAGE: 0
BODYLANGUAGE: 0
NEGVOCALIZATION: 0
CONSOLABILITY: 0
BODYLANGUAGE: 0
FACIALEXPRESSION: 0
NEGVOCALIZATION: 0
CONSOLABILITY: 0
CONSOLABILITY: 0
BODYLANGUAGE: 0
BODYLANGUAGE: 0
BREATHING: 0
BODYLANGUAGE: 0
FACIALEXPRESSION: 0
TOTALSCORE: 0
BREATHING: 0
FACIALEXPRESSION: 0
TOTALSCORE: 0
TOTALSCORE: 0
FACIALEXPRESSION: 0
NEGVOCALIZATION: 0
NEGVOCALIZATION: 0
CONSOLABILITY: 0
TOTALSCORE: 0
CONSOLABILITY: 0
TOTALSCORE: 0
NEGVOCALIZATION: 0
BODYLANGUAGE: 0
TOTALSCORE: 0
CONSOLABILITY: 0
CONSOLABILITY: 0
TOTALSCORE: 0

## 2024-01-30 ASSESSMENT — PAIN SCALES - WONG BAKER
WONGBAKER_NUMERICALRESPONSE: 0

## 2024-01-30 ASSESSMENT — PAIN SCALES - GENERAL
PAINLEVEL_OUTOF10: 0
PAINLEVEL_OUTOF10: 0

## 2024-01-30 NOTE — PLAN OF CARE
Problem: Discharge Planning  Goal: Discharge to home or other facility with appropriate resources  Outcome: Progressing     Problem: Pain  Goal: Verbalizes/displays adequate comfort level or baseline comfort level  Outcome: Progressing     Problem: Safety - Adult  Goal: Free from fall injury  Outcome: Progressing     Problem: Neurosensory - Adult  Goal: Achieves stable or improved neurological status  Outcome: Progressing  Goal: Absence of seizures  Outcome: Progressing  Goal: Remains free of injury related to seizures activity  Outcome: Progressing  Goal: Achieves maximal functionality and self care  Outcome: Progressing     Problem: Cardiovascular - Adult  Goal: Maintains optimal cardiac output and hemodynamic stability  Outcome: Progressing  Goal: Absence of cardiac dysrhythmias or at baseline  Outcome: Progressing     Problem: Respiratory - Adult  Goal: Achieves optimal ventilation and oxygenation  Outcome: Progressing     Problem: Gastrointestinal - Adult  Goal: Minimal or absence of nausea and vomiting  Outcome: Progressing  Goal: Maintains or returns to baseline bowel function  Outcome: Progressing  Goal: Maintains adequate nutritional intake  Outcome: Progressing  Goal: Establish and maintain optimal ostomy function  Outcome: Progressing     Problem: Genitourinary - Adult  Goal: Absence of urinary retention  Outcome: Progressing  Goal: Urinary catheter remains patent  Outcome: Progressing     Problem: Skin/Tissue Integrity - Adult  Goal: Skin integrity remains intact  Outcome: Progressing  Goal: Incisions, wounds, or drain sites healing without S/S of infection  Outcome: Progressing  Goal: Oral mucous membranes remain intact  Outcome: Progressing     Problem: Hematologic - Adult  Goal: Maintains hematologic stability  Outcome: Progressing     Problem: Musculoskeletal - Adult  Goal: Return mobility to safest level of function  Outcome: Progressing  Goal: Maintain proper alignment of affected body

## 2024-01-30 NOTE — PROGRESS NOTES
Comprehensive Nutrition Assessment    RECOMMENDATIONS:  PO Diet: NPO- monitor ability to initiate PO diet  ONS: When PO diet resumes, add Ensure High protein BID  Nutrition Education: Education not appropriate   Monitor labs, fluids    NUTRITION ASSESSMENT:   Nutritional summary & status: Positive screen for poor PO, wt loss and BMI. Pt currently in droplet precautions for flu. Pt admitted with AMSr/t pnuemonia and sepsis. Currently NPO and placed in restraints today. Fluids running, Na+ elevated and abnormal lytes. Pt with wounds to sacrum, buttocks, thigh. RD will monitor ability to initiate PO diet and add ONS.   Admission // PMH: Pneumonia/Sepsis // IBS, HTN, HLD, DM2, Afib    MALNUTRITION ASSESSMENT  Context of Malnutrition: Acute Illness   Malnutrition Status: Severe malnutrition  Findings of the 6 clinical characteristics of malnutrition (Minimum of 2 out of 6 clinical characteristics is required to make the diagnosis of moderate or severe Protein Calorie Malnutrition based on AND/ASPEN Guidelines):  Energy Intake:  Mild decrease in energy intake (Comment)  Weight Loss:  No significant weight loss     Body Fat Loss:  Moderate body fat loss Orbital, Triceps, Fat Overlying Ribs   Muscle Mass Loss:  Moderate muscle mass loss Temples (temporalis)  Fluid Accumulation:  No significant fluid accumulation     Strength:  Not Performed    NUTRITION DIAGNOSIS   Inadequate oral intake related to cognitive or neurological impairment as evidenced by NPO or clear liquid status due to medical condition    Nutrition Monitoring and Evaluation:   Food/Nutrient Intake Outcomes:  Diet Advancement/Tolerance  Physical Signs/Symptoms Outcomes:  Biochemical Data, Chewing or Swallowing, Skin     OBJECTIVE DATA: Significant to nutrition assessment  Nutrition Related Findings: Na+ 148, K+ 2.8, Mg 1.7, phos 1.8, Kcl in D5 @100  Wounds: Multiple  Nutrition Goals: Initiate PO diet     CURRENT NUTRITION THERAPIES  Diet NPO Exceptions

## 2024-01-30 NOTE — PROGRESS NOTES
Pt's Diltiazem gtt titrated from 12.5mg/hr at 1003 to off at 1106 per verbal orders from MD at bedside. Electronically signed by Andrei Pérez RN on 1/30/2024 at 5:26 PM

## 2024-01-30 NOTE — PROGRESS NOTES
As required by CMS, I notified the attending physician restraints were ordered on 1/29/24 for Zuleima iMr. Acknowledgement of this order by the attending physician was confirmed.

## 2024-01-30 NOTE — PROGRESS NOTES
[facility] New Patient Note    CC: Patient presents with:  Test Results: Patient is here today to review MRI test results for left shoulder.        HPI: The patient is a 37year old male who presents today at the request of Dr. Lacie brower Speech-Language Pathology  Attempt Note/HOLD      0840: SLP attempted to see pt this AM for dysphagia f/u. Pt current receiving care from staff members. SLP will re-attempt as schedule allows and as pt able.   Kiley Shankar MA, CCC-SLP  [id number]  Speech-Language Pathologist  Pg. #[phone]          Second Attempt  0930:  Spoke with RN, who indicates that pt is receiving care at this time.  Will re-attempt at a later time/date.  Please page ST department with any concerns.  Electronically Signed by:  Yvonne Oneill M.A., CCC-SLP  Speech-Language Pathologist  SP. [id number]  Pager #[phone]        Third Attempt/HOLD    1024: SLP re-attempted to see pt, but per discussion with internal medicine resident, he requested that SLP HOLD for now due to worsening medical status. Discussed with resident that SLP recommends instrumental assessment for initiating diet due to concern for aspiration and risk for aspiration pneumonia. Pt not able to be taken down to radiology for MBSS and per discussion with pt's daughter yesterday, pt would not be able to tolerate a FEES. SLP will re-attempt as pt able/appropriate. Recommend continuing NPO at this time with oral care 2-3x/day.        Kiley Shankar MA, CCC-SLP  [id number]  Speech-Language Pathologist  Pg. #[phone]       negative bilaterally. He has no significant swelling or discoloration about the left shoulder. AC joint is nontender with excellent full active flexion abduction and rotation without complaints of pain through the impingement zone.   5 out of 5 motor stre software.

## 2024-01-30 NOTE — PROGRESS NOTES
Cardiology Consult Service  Daily Progress Note        Admit Date:  1/28/2024  Primary cardiologist: Dr Rosales, new     Reason for Consultation/Chief Complaint: PAFRVR    Subjective:      Zuleima Mir is a 83 y.o. female with a past medical history of smoker, COPD not on oxygen, IBS, HTN, HLP, DM, on chronic benzodiazepines and opioids.     Patient presented on 1/28 with decreased p.o. intake, altered mental status and fatigue with cough over the last few weeks.  She was hypoxic, febrile (Tmax 100.4), severely tachycardic 180s with low normal BP, requiring 15 L of supplemental oxygen for adequate saturation.  Creatinine 2.0, chest x-ray consistent with multifocal pneumonia.  ECG consistent with  bpm, nonspecific changes.  She was admitted for sepsis due to community-acquired pneumonia, NYA, PAF RVR, influenza infection.  She was initially placed on diltiazem drip and was given digoxin 500 mcg IV x 1 along with IV antibiotics.  She received LR 2 L as a bolus and was placed on  mill per hour.  Cardiology was consulted.  Patient subsequently became hypotensive and diltiazem drip was changed to amiodarone drip after discussing the case with me.     Patient currently appears hemodynamically stable.  Telemetry personally reviewed, reveals normal sinus rhythm rates in the 80s.  She is now requiring only 5 L of supplemental oxygen.  Heparin drip was held by primary team due to positive Hemoccult and GI was consulted.  Hemoglobin down to 13.9 from 16.8 (I suspect hemodilution after receiving IV fluids).  Patient appears confused, does not report any complaints.    CTA chest 1/20/2024 negative for PE, mucous plugging and bronchial thickening and bronchiectasis mostly in the lower lobes noted.    Interval history:  Patient continues on 8 L of supplemental oxygen.  Hemoglobin up at 17, sodium up at 148, K2.8.  Patient has been n.p.o.  Creatinine improved to 0.5 from 2.0 on admission.  Telemetry consistent with normal

## 2024-01-30 NOTE — PROGRESS NOTES
Pharmacy Note - Extended Infusion Beta-Lactam Adjustment    Meropenem ordered for treatment of sepsis / PNA. Per [facility] Extended Infusion Beta-Lactam Policy, Meropenem will be changed to 1000 mg IV EI q8h.     Estimated Creatinine Clearance: Estimated Creatinine Clearance: 57 mL/min (based on SCr of 0.5 mg/dL).  Dialysis Status, NYA, CKD: NYA resolved  BMI: Body mass index is 17.58 kg/m².    Rationale for Adjustment: Agent is renally eliminated and demonstrates time-dependent effect on bacterial eradication. Extended-infusion dosing strategy aims to enhance microbiologic and clinical efficacy.    Pharmacy will continue to monitor renal function and adjust dose as necessary.      Please call with questions--  Kiara NelsonD, BCPS  Wireless: [phone]   1/30/2024 9:19 AM

## 2024-01-30 NOTE — RT PROTOCOL NOTE
RT Nebulizer Bronchodilator Protocol Note    There is a bronchodilator order in the chart from a provider indicating to follow the RT Bronchodilator Protocol and there is an “Initiate RT Bronchodilator Protocol” order as well (see protocol at bottom of note).    CXR Findings:  No results found.    The findings from the last RT Protocol Assessment were as follows:  Smoking: Chronic pulmonary disease  Respiratory Pattern: Mild dyspnea at rest, irregular pattern, or RR 21-25 bpm  Breath Sounds: Inspiratory and expiratory or bilateral wheezing and/or rhonchi  Cough: Strong, spontaneous, non-productive  Indication for Bronchodilator Therapy: Decreased or absent breath sounds  Bronchodilator Assessment Score: 12    Aerosolized bronchodilator medication orders have been revised according to the RT Nebulizer Bronchodilator Protocol below.    Respiratory Therapist to perform RT Therapy Protocol Assessment initially then follow the protocol.  Repeat RT Therapy Protocol Assessment PRN for score 0-3 or on second treatment, BID, and PRN for scores above 3.    No Indications - adjust the frequency to every 6 hours PRN wheezing or bronchospasm, if no treatments needed after 48 hours then discontinue using Per Protocol order mode.     If indication present, adjust the RT bronchodilator orders based on the Bronchodilator Assessment Score as indicated below.  If a patient is on this medication at home then do not decrease Frequency below that used at home.    0-3 - enter or revise RT bronchodilator order(s) to equivalent RT Bronchodilator order with Frequency of every 4 hours PRN for wheezing or increased work of breathing using Per Protocol order mode.       4-6 - enter or revise RT Bronchodilator order(s) to two equivalent RT bronchodilator orders with one order with BID Frequency and one order with Frequency of every 4 hours PRN wheezing or increased work of breathing using Per Protocol order mode.         7-10 - enter or revise  RT Bronchodilator order(s) to two equivalent RT bronchodilator orders with one order with TID Frequency and one order with Frequency of every 4 hours PRN wheezing or increased work of breathing using Per Protocol order mode.       11-13 - enter or revise RT Bronchodilator order(s) to one equivalent RT bronchodilator order with QID Frequency and an Albuterol order with Frequency of every 4 hours PRN wheezing or increased work of breathing using Per Protocol order mode.      Greater than 13 - enter or revise RT Bronchodilator order(s) to one equivalent RT bronchodilator order with every 4 hours Frequency and an Albuterol order with Frequency of every 2 hours PRN wheezing or increased work of breathing using Per Protocol order mode.     RT to enter RT Home Evaluation for COPD & MDI Assessment order using Per Protocol order mode.    Electronically signed by Violetta Dailey RCP on 1/30/2024 at 10:01 AM

## 2024-01-30 NOTE — PROGRESS NOTES
Pt's AntiXA level came back critical. Heparin gtt paused for 60 mins, per order algorithm. Rate decreased by 3 units/kg/hr and restarted. Additional AntiXA lab ordered for 2200, 6 hours from rate change.

## 2024-01-30 NOTE — PROGRESS NOTES
Clinical Pharmacy Progress Note    Vancomycin - Management by Pharmacy    Consult Date(s): 1/28/24  Consulting Provider(s): Ara Haley MD     Assessment / Plan  Severe sepsis, PNA - Vancomycin  Concurrent Antimicrobials: Meropenem  Day of Vanc Therapy / Ordered Duration: 4 of 7  Current Dosing Method: Bayesian AUC Dosing   Therapeutic Goal: -600   Current Dose / Plan:   Admitted with NYA which has improved. SCr <0.5 this Am.  Currently on 750mg IV q18h.  Level this AM was 20.1 mg/L - but drawn while dose was infusing - inaccurate level.  Given further improvement in renal function, will increase dose to 750mg IV q12h.  Regimen predicts AUC of 481 mg/L*h  Level ordered for tomorrow AM, Wed 1/31 to confirm kinetic estimates.  Will continue to monitor clinical condition and make adjustments to regimen as appropriate.    Please call with questions--  Annemarie Orozco, PharmD, [facility]  Wireless: [phone]   1/30/2024 9:12 AM        Interval update:  SCr improved to <0.5 today. Blood Cx growing Staph epi x 1 bottle. Removed O2 muliple times overnight which caused desatting 70-80s; eventually placed in restraints.    Subjective/Objective:   Zuleima Mir is a 83 y.o. female with a PMHx significant for COPD, T2DM, HTN, who presented to ED with AMS. On presentation, pt was noted to be in AFib, CXR showed extensive LLL and patchy RLL consolidation consistent with PNA. Admitted with sepsis, PNA, influenza and NYA.    Pharmacy is consulted to manage vancomycin.    Ht Readings from Last 1 Encounters:   01/28/24 1.575 m (5' 2\")     Wt Readings from Last 1 Encounters:   01/29/24 43.6 kg (96 lb 1.9 oz)     Current & Prior Antimicrobial Regimen(s):  Meropenem (1/28-current)  Vancomycin - Pharmacy to dose  750mg IV q18h (1/29-1/30)  750mg IV q12h (1/30-current)    Vancomycin Level(s) / Doses:  Date Time Dose Type of Level / Level Interpretation   1/29 05:48 500mg IV q24h Random = 13.6 mg/L Drawn ~3h after prior dose  Difficult to  interpret level - likely still in distributive phase   1/30 06:25 750mg IV q18h Random = 20.5 mg/L Drawn ~30 min after prior dose hung (while dose infusing) -- inaccurate level    Note: Serum levels collected for AUC-based dosing may be high if collected in close proximity to the dose administered. This is not necessarily indicative of toxicity.    Cultures & Sensitivities:    Date Site Micro Susceptibility / Result   1/28 MRSA nares Sent    1/28 Blood x 2 Staph Epi     1/28 Covid/Flu Influenza A    1/28 Resp Cx Sent    1/28 Strep/Legionella Sent    1/28 PNA panel Sent      Recent Labs     01/28/24  0250 01/28/24  0752 01/28/24  1049 01/29/24  0548 01/30/24  0625   CREATININE 2.0* 1.4*  --  0.6 <0.5*   BUN 95* 87*  --  47* 23*   WBC 9.6  --  7.1 7.4  --        Estimated Creatinine Clearance: 57 mL/min (based on SCr of 0.5 mg/dL).    Additional Lab Values / Findings of Note:    Recent Labs     01/28/24  1049   PROCAL 8.64*

## 2024-01-30 NOTE — PROGRESS NOTES
Department of Pharmacy    Notification received from laboratory of positive blood culture results.    Organism(s) detected: Staphylococcus epidermidis DNA     Currently only one bottle resulted, unclear if possible contaminant. Will be covered by Vancomycin.     Recommendations reviewed with NOELLE Champion

## 2024-01-30 NOTE — PROGRESS NOTES
Progress Note    Admit Date: 1/28/2024  Day: 2  Diet: Diet NPO Exceptions are: Sips of Water with Meds    CC: AMS, fatigue    Interval history:   Pt with increasing O2 requirements this AM, now on 12-15 L. CXR obtained showing overall stable pulmonary findings to recently. Remains encephalopathic. Still in NSR with sustained blood control on amiodarone drip. On heparin drip, possible single dark bowel movement noted overnight but Hgb overall remaining stable. Re-discussed GOC with daughter, remains full code at this time but understands that prognosis with resuscitation would be poor. Continues to think about possible switch to DNR-CCA.    HPI:   Zuleima Mir is a 83 y.o. female, presented with AMS. Patient has a PMHx of   IBS, HTN, HLD, allergic rhinitis, DM2.  Patient is too altered to converse however her daughter was in the room to provide information.  She mentions that over the past few weeks her mother started becoming more fatigued, weak, altered, had been eating less, and developed a productive cough.  She is unsure if it is a thick or thin sputum or its color.  She said that she had become so weak that her legs gave out and she was not able to walk.  She wanted to bring her to the hospital sooner however her mother denied.     At the ED, , /52.  She was found to have atrial fibrillation on monitor and her daughter denies her ever having atrial fibrillation nor it is not seen on her chart.  She was started on a Dilt gtt which improved her heart rate to ~140-160s and was given a bolus of 1.3L of LR which improved her BP as well.  Her labs were pertinent for creatinine of 2.0 which is elevated from her baseline of around 0.8, LA 3.4-> 12.7, troponin 31.  She is influenza A positive.  CXR showed extensive left lower lung and patchy right lower lung consolidation most compatible with multifocal pneumonia.     She will be admitted to [facility] to further manage her septic pneumonia and NYA.    Medications:      Scheduled Meds:   vancomycin  750 mg IntraVENous Q12H    meropenem  1,000 mg IntraVENous Q8H    acetylcysteine  600 mg Inhalation BID RT    potassium phosphate IVPB (PERIPHERAL LINE)  20 mmol IntraVENous Once    potassium chloride  10 mEq IntraVENous Q1H    sodium chloride (Inhalant)  4 mL Nebulization 4x Daily    balsum peru-castor oil   Topical BID    budesonide  0.25 mg Nebulization BID RT    sodium chloride flush  5-40 mL IntraVENous 2 times per day    levalbuterol  0.63 mg Nebulization Q4H WA RT    ipratropium  0.5 mg Nebulization 4x Daily RT    pantoprazole (PROTONIX) 40 mg in sodium chloride (PF) 0.9 % 10 mL injection  40 mg IntraVENous BID     Continuous Infusions:   potassium chloride 20 mEq in dextrose 5 % 1,000 mL infusion 100 mL/hr at 01/30/24 1030    sodium chloride 25 mL (01/30/24 0557)    amiodarone 0.5 mg/min (01/30/24 5544)    heparin (PORCINE) Infusion 18 Units/kg/hr (01/30/24 6428)     PRN Meds:metoprolol, sodium chloride flush, sodium chloride, ondansetron **OR** ondansetron, polyethylene glycol, acetaminophen **OR** acetaminophen, [Held by provider] ALPRAZolam, perflutren lipid microspheres, heparin (porcine), heparin (porcine)    Objective:   Vitals:   T-max:  Patient Vitals for the past 8 hrs:   BP Temp Temp src Pulse Resp SpO2   01/30/24 1024 (!) 163/63 -- -- 89 -- 93 %   01/30/24 1003 -- -- -- 93 -- 96 %   01/30/24 0959 -- -- -- 95 -- 93 %   01/30/24 0958 -- -- -- 93 20 93 %   01/30/24 0956 -- -- -- 95 -- 93 %   01/30/24 0953 -- -- -- 96 -- 90 %   01/30/24 0904 -- -- -- -- -- 90 %   01/30/24 0818 (!) 153/56 99.6 °F (37.6 °C) Bladder 88 22 91 %   01/30/24 0600 (!) 148/52 -- -- 91 -- --   01/30/24 0350 (!) 154/56 99.9 °F (37.7 °C) Bladder 88 22 --         Intake/Output Summary (Last 24 hours) at 1/30/2024 1122  Last data filed at 1/30/2024 1024  Gross per 24 hour   Intake 0 ml   Output 1175 ml   Net -1175 ml         Review of Systems  Pertinent positives and negatives as listed in the

## 2024-01-30 NOTE — PROGRESS NOTES
Progress Note    Patient Zuleima Mir  MRN: [id number]  YOB: 1940 Age: 83 y.o. Sex: female  Room: [address] [place] [address]       Admitting Physician: Olivia Medina MD   Date of Admission: 1/28/2024  2:29 AM   Primary Care Physician: Van Neal     Subjective:  Zuleima Mir was seen and examined.     Patient continues to remain short of breath requiring high oxygen  Hemoglobin stable  No evidence of any overt GI bleeding      Objective:  Vital Signs:   Vitals:    01/30/24 1024   BP: (!) 163/63   Pulse: 89   Resp:    Temp:    SpO2: 93%         Physical Exam:  Constitutional: Patient remains short of breath requiring high oxygen  acute distress.   HEENT: Sclera anicteric, mucosal membranes moist  Cardiovascular: IRRegular rate and rhythm.  No murmurs.  Respiratory: Tachypneic, bilateral wheezing   GI: Abdomen nondistended, soft, and nontender.    Rectal: Deferred  Musculoskeletal:  No pitting edema of the lower legs.      Intake/Output:    Intake/Output Summary (Last 24 hours) at 1/30/2024 1050  Last data filed at 1/30/2024 0602  Gross per 24 hour   Intake 0 ml   Output 850 ml   Net -850 ml        Current Medications:  Current Facility-Administered Medications   Medication Dose Route Frequency Provider Last Rate Last Admin    dextrose 5 % solution   IntraVENous Continuous Jesus Parikh MD        potassium chloride 20 mEq in dextrose 5 % 1,000 mL infusion   IntraVENous Continuous Jesus Parikh  mL/hr at 01/30/24 1030 New Bag at 01/30/24 1030    vancomycin (VANCOCIN) 750 mg in sodium chloride 0.9 % 250 mL IVPB  750 mg IntraVENous Q12H Ara Haley MD        meropenem (MERREM) 1,000 mg in sodium chloride 0.9 % 100 mL IVPB (mini-bag)  1,000 mg IntraVENous Q8H Gurinder Vogel DO        acetylcysteine (MUCOMYST) 20 % solution 600 mg  600 mg Inhalation BID RT Stew Garcia MD        sodium chloride (Inhalant) 3 % nebulizer solution 4 mL  4 mL Nebulization 4x Daily Stew Garcia MD   4 mL at

## 2024-01-30 NOTE — CARE COORDINATION
CM attempted to call dgtr to complete admission assessment- no answer and VM box full. CM will continue to follow.    Thank you  Rebeca Weston RN, BSN, [facility]   [phone]

## 2024-01-30 NOTE — PROGRESS NOTES
Pt placed in RACHELLE soft wrist restraints at this time. MD notified and order placed. Alanna, pt's daughter called to update of restraints but did not answer at this time.

## 2024-01-30 NOTE — PROGRESS NOTES
Pulmonary Followup Note    CC: PNA, respiratory failure  Subjective:  No acute overnight events. Currently on 12 L, saturating in mid 90%. Her mentation is clearer compared to yesterday. Though the words are jumbled, she is interactive.     ROS:  Denies headache, nausea or chest pain.    24HR INTAKE/OUTPUT:    Intake/Output Summary (Last 24 hours) at 2024 0930  Last data filed at 2024 0602  Gross per 24 hour   Intake 0 ml   Output 850 ml   Net -850 ml        vancomycin  750 mg IntraVENous Q12H    meropenem  1,000 mg IntraVENous Q8H    sodium chloride (Inhalant)  4 mL Nebulization 4x Daily    balsum peru-castor oil   Topical BID    budesonide  0.25 mg Nebulization BID RT    sodium chloride flush  5-40 mL IntraVENous 2 times per day    metoprolol tartrate  25 mg Oral BID    levalbuterol  0.63 mg Nebulization Q4H WA RT    ipratropium  0.5 mg Nebulization 4x Daily RT    pantoprazole (PROTONIX) 40 mg in sodium chloride (PF) 0.9 % 10 mL injection  40 mg IntraVENous BID           PHYSICAL EXAMINATION:  /83   Pulse 77   Temp 97.4 °F (36.3 °C) (Axillary)   Resp 20   Ht 1.575 m (5' 2\")   Wt 43.6 kg (96 lb 1.9 oz)   SpO2 90%   BMI 17.58 kg/m²   CURRENT PULSE OXIMETRY:  SpO2: 90 %  24HR PULSE OXIMETRY RANGE:  SpO2  Av.5 %  Min: 90 %  Max: 98 %       Gen: No acute distress. Speaking without the use of accessory muscles.   HEENT: PERRL, EOMI, OP nl  Lung:  Bilateral rhonchi  CV: RRR without M/R/R  Abd: +BS, soft, NT/ND  Ext: No edema.    DATA  CBC:   Recent Labs     24  0250 24  1049 24  1916 24  0548 24  1322 24  2358 24  0625   WBC 9.6 7.1  --  7.4  --   --   --    HGB 16.8* 13.9   < > 13.7 12.8 12.7 17.5*   HCT 49.9* 41.9   < > 40.6 39.5 38.6 53.3*   MCV 94.5 95.7  --  95.4  --   --   --     113*  --  124*  --   --   --     < > = values in this interval not displayed.     BMP:   Recent Labs     24  0752  systems is as noted above. My physical exam confirms the findings listed above.   Chart was reviewed including Labs, CXR, CT scan, EKG, and Medical records confirm the findings noted above.   I edited the note where appropriate.    Briefly, this is a 83 y.o. female admitted to the hospital for evaluation of unresponsiveness and decline in functional status, pulmonary medicine consulted for evaluation of acute hypoxic respiratory failure.  Patient is not really answering questions although she is alert, family at bedside was able to give us information about progressive decline in functioning for the past week but likely going longer than that.  There is a report of patient developing melena prior to admission as well, occult blood test positive.    INTERVAL HISTORY:  Oxygen requirements have increased today, patient is more alert but not following commands.  Family at bedside was informed of current worsening oxygenation requirements as well as ongoing problems, extensive conversation was held regarding goals of care.  Palliative already on board      Intake/Output Summary (Last 24 hours) at 2024 1722  Last data filed at 2024 1500  Gross per 24 hour   Intake 0 ml   Output 1650 ml   Net -1650 ml    SpO2: 97 %  SpO2  Av.6 %  Min: 87 %  Max: 98 %   - O2 Flow Rate (L/min): 10 L/min     ASSESSMENT:  Acute hypoxic respiratory failure   Bilateral LL PNA -suspect chronic aspiration  Subsegmental PE  A-fib, rate controlled  History of COPD  Cachexia    PLAN:  Provide O2 supplementation to keep SpO2 between 88-92% if needed.    Bronchodilators  Mucolytics: 3% and Mucomyst nebs twice daily  Empiric antibiotic therapy  Continue heparin drip  Aspiration precautions  Palliative medicine on board, appreciate recommendations     Kurt Bourne \"Ace\" Tracy Hardy MD  Pulmonary and Critical Care Medicine

## 2024-01-30 NOTE — PLAN OF CARE
Problem: Safety - Adult  Goal: Free from fall injury  1/30/2024 1259 by Jody Silva RN  Outcome: Progressing  Flowsheets (Taken 1/30/2024 1259)  Free From Fall Injury:   Based on caregiver fall risk screen, instruct family/caregiver to ask for assistance with transferring infant if caregiver noted to have fall risk factors   Instruct family/caregiver on patient safety  Note: Video monitoring for safety, soft RACHELLE wrist restraints in place.      Problem: Neurosensory - Adult  Goal: Achieves stable or improved neurological status  1/30/2024 1259 by Jody Silva RN  Outcome: Progressing  Flowsheets (Taken 1/30/2024 1259)  Achieves stable or improved neurological status:   Assess for and report changes in neurological status   Initiate measures to prevent increased intracranial pressure   Maintain blood pressure and fluid volume within ordered parameters to optimize cerebral perfusion and minimize risk of hemorrhage     Problem: Cardiovascular - Adult  Goal: Maintains optimal cardiac output and hemodynamic stability  1/30/2024 1259 by Jody Silva RN  Outcome: Progressing  Flowsheets (Taken 1/30/2024 1259)  Maintains optimal cardiac output and hemodynamic stability:   Monitor blood pressure and heart rate   Monitor urine output and notify Licensed Independent Practitioner for values outside of normal range   Assess for signs of decreased cardiac output  Note: Amio gtt infusing     Problem: Genitourinary - Adult  Goal: Urinary catheter remains patent  1/30/2024 1259 by Jody Silva RN  Outcome: Progressing  Flowsheets (Taken 1/30/2024 1259)  Urinary catheter remains patent:   Assess patency of urinary catheter   Irrigate catheter per Licensed Independent Practitioner order if indicated and notify Licensed Independent Practitioner if unable to irrigate   Assess need for a larger catheter size or a 3-way catheter for continuous bladder irrigation     Problem: Skin/Tissue Integrity  Goal: Absence of new skin

## 2024-01-31 ENCOUNTER — APPOINTMENT (OUTPATIENT)
Dept: GENERAL RADIOLOGY | Age: 84
DRG: 871 | End: 2024-01-31
Payer: MEDICARE

## 2024-01-31 PROBLEM — Z51.5 ENCOUNTER FOR PALLIATIVE CARE: Status: ACTIVE | Noted: 2024-01-31

## 2024-01-31 LAB
ALBUMIN SERPL-MCNC: 1.8 G/DL (ref 3.4–5)
ALP SERPL-CCNC: 62 U/L (ref 40–129)
ALT SERPL-CCNC: 39 U/L (ref 10–40)
ANION GAP SERPL CALCULATED.3IONS-SCNC: 15 MMOL/L (ref 3–16)
ANTI-XA UNFRAC HEPARIN: <0.1 IU/ML (ref 0.3–0.7)
ANTI-XA UNFRAC HEPARIN: <0.1 IU/ML (ref 0.3–0.7)
AST SERPL-CCNC: 75 U/L (ref 15–37)
BACTERIA BLD CULT: ABNORMAL
BACTERIA BLD CULT: ABNORMAL
BASOPHILS # BLD: 0 K/UL (ref 0–0.2)
BASOPHILS NFR BLD: 0.1 %
BILIRUB DIRECT SERPL-MCNC: <0.2 MG/DL (ref 0–0.3)
BILIRUB INDIRECT SERPL-MCNC: ABNORMAL MG/DL (ref 0–1)
BILIRUB SERPL-MCNC: 0.9 MG/DL (ref 0–1)
BUN SERPL-MCNC: 16 MG/DL (ref 7–20)
CALCIUM SERPL-MCNC: 8.3 MG/DL (ref 8.3–10.6)
CHLORIDE SERPL-SCNC: 105 MMOL/L (ref 99–110)
CO2 SERPL-SCNC: 21 MMOL/L (ref 21–32)
CREAT SERPL-MCNC: <0.5 MG/DL (ref 0.6–1.2)
DEPRECATED RDW RBC AUTO: 13.7 % (ref 12.4–15.4)
EOSINOPHIL # BLD: 0 K/UL (ref 0–0.6)
EOSINOPHIL NFR BLD: 0 %
GFR SERPLBLD CREATININE-BSD FMLA CKD-EPI: >60 ML/MIN/{1.73_M2}
GLUCOSE SERPL-MCNC: 147 MG/DL (ref 70–99)
HCT VFR BLD AUTO: 35.8 % (ref 36–48)
HCT VFR BLD AUTO: 39.8 % (ref 36–48)
HGB BLD-MCNC: 12.1 G/DL (ref 12–16)
HGB BLD-MCNC: 13.5 G/DL (ref 12–16)
LYMPHOCYTES # BLD: 0.4 K/UL (ref 1–5.1)
LYMPHOCYTES NFR BLD: 3.3 %
MAGNESIUM SERPL-MCNC: 2.2 MG/DL (ref 1.8–2.4)
MCH RBC QN AUTO: 31.7 PG (ref 26–34)
MCHC RBC AUTO-ENTMCNC: 33.7 G/DL (ref 31–36)
MCV RBC AUTO: 94.1 FL (ref 80–100)
MONOCYTES # BLD: 0.3 K/UL (ref 0–1.3)
MONOCYTES NFR BLD: 2.8 %
NEUTROPHILS # BLD: 10.8 K/UL (ref 1.7–7.7)
NEUTROPHILS NFR BLD: 93.8 %
ORGANISM: ABNORMAL
ORGANISM: ABNORMAL
PHOSPHATE SERPL-MCNC: 2.2 MG/DL (ref 2.5–4.9)
PLATELET # BLD AUTO: 109 K/UL (ref 135–450)
PMV BLD AUTO: 9 FL (ref 5–10.5)
POTASSIUM SERPL-SCNC: 4.1 MMOL/L (ref 3.5–5.1)
PROT SERPL-MCNC: 6 G/DL (ref 6.4–8.2)
RBC # BLD AUTO: 3.81 M/UL (ref 4–5.2)
SODIUM SERPL-SCNC: 141 MMOL/L (ref 136–145)
VANCOMYCIN SERPL-MCNC: 13 UG/ML
WBC # BLD AUTO: 11.6 K/UL (ref 4–11)

## 2024-01-31 PROCEDURE — 99221 1ST HOSP IP/OBS SF/LOW 40: CPT | Performed by: NURSE PRACTITIONER

## 2024-01-31 PROCEDURE — C9113 INJ PANTOPRAZOLE SODIUM, VIA: HCPCS

## 2024-01-31 PROCEDURE — A4216 STERILE WATER/SALINE, 10 ML: HCPCS

## 2024-01-31 PROCEDURE — 6360000002 HC RX W HCPCS

## 2024-01-31 PROCEDURE — 99291 CRITICAL CARE FIRST HOUR: CPT | Performed by: INTERNAL MEDICINE

## 2024-01-31 PROCEDURE — 93005 ELECTROCARDIOGRAM TRACING: CPT | Performed by: INTERNAL MEDICINE

## 2024-01-31 PROCEDURE — 80076 HEPATIC FUNCTION PANEL: CPT

## 2024-01-31 PROCEDURE — 36415 COLL VENOUS BLD VENIPUNCTURE: CPT

## 2024-01-31 PROCEDURE — 85025 COMPLETE CBC W/AUTO DIFF WBC: CPT

## 2024-01-31 PROCEDURE — 94761 N-INVAS EAR/PLS OXIMETRY MLT: CPT

## 2024-01-31 PROCEDURE — 94640 AIRWAY INHALATION TREATMENT: CPT

## 2024-01-31 PROCEDURE — 2580000003 HC RX 258

## 2024-01-31 PROCEDURE — 6360000002 HC RX W HCPCS: Performed by: INTERNAL MEDICINE

## 2024-01-31 PROCEDURE — 80069 RENAL FUNCTION PANEL: CPT

## 2024-01-31 PROCEDURE — 85520 HEPARIN ASSAY: CPT

## 2024-01-31 PROCEDURE — 99233 SBSQ HOSP IP/OBS HIGH 50: CPT | Performed by: INTERNAL MEDICINE

## 2024-01-31 PROCEDURE — 71045 X-RAY EXAM CHEST 1 VIEW: CPT

## 2024-01-31 PROCEDURE — 2060000000 HC ICU INTERMEDIATE R&B

## 2024-01-31 PROCEDURE — 85018 HEMOGLOBIN: CPT

## 2024-01-31 PROCEDURE — 80202 ASSAY OF VANCOMYCIN: CPT

## 2024-01-31 PROCEDURE — 2500000003 HC RX 250 WO HCPCS

## 2024-01-31 PROCEDURE — 85014 HEMATOCRIT: CPT

## 2024-01-31 PROCEDURE — 2700000000 HC OXYGEN THERAPY PER DAY

## 2024-01-31 PROCEDURE — 83735 ASSAY OF MAGNESIUM: CPT

## 2024-01-31 RX ORDER — FUROSEMIDE 10 MG/ML
40 INJECTION INTRAMUSCULAR; INTRAVENOUS ONCE
Status: COMPLETED | OUTPATIENT
Start: 2024-01-31 | End: 2024-01-31

## 2024-01-31 RX ADMIN — SODIUM CHLORIDE 30 MG/ML INHALATION SOLUTION 4 ML: 30 SOLUTION INHALANT at 08:06

## 2024-01-31 RX ADMIN — IPRATROPIUM BROMIDE 0.5 MG: 0.5 SOLUTION RESPIRATORY (INHALATION) at 23:15

## 2024-01-31 RX ADMIN — DEXTROSE MONOHYDRATE 150 MG: 50 INJECTION, SOLUTION INTRAVENOUS at 15:40

## 2024-01-31 RX ADMIN — BUDESONIDE 250 MCG: 0.25 INHALANT RESPIRATORY (INHALATION) at 08:05

## 2024-01-31 RX ADMIN — HEPARIN SODIUM 19 UNITS/KG/HR: 10000 INJECTION, SOLUTION INTRAVENOUS at 09:02

## 2024-01-31 RX ADMIN — SODIUM CHLORIDE, PRESERVATIVE FREE 10 ML: 5 INJECTION INTRAVENOUS at 20:03

## 2024-01-31 RX ADMIN — SODIUM CHLORIDE, PRESERVATIVE FREE 40 MG: 5 INJECTION INTRAVENOUS at 09:16

## 2024-01-31 RX ADMIN — VANCOMYCIN HYDROCHLORIDE 750 MG: 10 INJECTION, POWDER, LYOPHILIZED, FOR SOLUTION INTRAVENOUS at 18:14

## 2024-01-31 RX ADMIN — IPRATROPIUM BROMIDE 0.5 MG: 0.5 SOLUTION RESPIRATORY (INHALATION) at 08:05

## 2024-01-31 RX ADMIN — FUROSEMIDE 40 MG: 10 INJECTION, SOLUTION INTRAMUSCULAR; INTRAVENOUS at 11:42

## 2024-01-31 RX ADMIN — SODIUM CHLORIDE, PRESERVATIVE FREE 40 MG: 5 INJECTION INTRAVENOUS at 20:02

## 2024-01-31 RX ADMIN — HEPARIN SODIUM 2640 UNITS: 1000 INJECTION INTRAVENOUS; SUBCUTANEOUS at 18:51

## 2024-01-31 RX ADMIN — IPRATROPIUM BROMIDE 0.5 MG: 0.5 SOLUTION RESPIRATORY (INHALATION) at 11:18

## 2024-01-31 RX ADMIN — AMIODARONE HYDROCHLORIDE 0.5 MG/MIN: 50 INJECTION, SOLUTION INTRAVENOUS at 14:02

## 2024-01-31 RX ADMIN — HEPARIN SODIUM 23 UNITS/KG/HR: 10000 INJECTION, SOLUTION INTRAVENOUS at 18:58

## 2024-01-31 RX ADMIN — VANCOMYCIN HYDROCHLORIDE 750 MG: 10 INJECTION, POWDER, LYOPHILIZED, FOR SOLUTION INTRAVENOUS at 06:30

## 2024-01-31 RX ADMIN — SODIUM CHLORIDE 25 ML: 9 INJECTION, SOLUTION INTRAVENOUS at 05:34

## 2024-01-31 RX ADMIN — LEVALBUTEROL HYDROCHLORIDE 0.63 MG: 0.63 SOLUTION RESPIRATORY (INHALATION) at 08:05

## 2024-01-31 RX ADMIN — MEROPENEM 1000 MG: 1 INJECTION INTRAVENOUS at 20:03

## 2024-01-31 RX ADMIN — LEVALBUTEROL HYDROCHLORIDE 0.63 MG: 0.63 SOLUTION RESPIRATORY (INHALATION) at 23:16

## 2024-01-31 RX ADMIN — BUDESONIDE 250 MCG: 0.25 INHALANT RESPIRATORY (INHALATION) at 23:16

## 2024-01-31 RX ADMIN — SODIUM CHLORIDE 25 ML: 9 INJECTION, SOLUTION INTRAVENOUS at 06:29

## 2024-01-31 RX ADMIN — POTASSIUM CHLORIDE: 2 INJECTION, SOLUTION, CONCENTRATE INTRAVENOUS at 02:55

## 2024-01-31 RX ADMIN — SODIUM CHLORIDE 30 MG/ML INHALATION SOLUTION 4 ML: 30 SOLUTION INHALANT at 23:16

## 2024-01-31 RX ADMIN — Medication: at 09:24

## 2024-01-31 RX ADMIN — HEPARIN SODIUM 2640 UNITS: 1000 INJECTION INTRAVENOUS; SUBCUTANEOUS at 09:00

## 2024-01-31 RX ADMIN — Medication: at 20:02

## 2024-01-31 RX ADMIN — MEROPENEM 1000 MG: 1 INJECTION INTRAVENOUS at 11:45

## 2024-01-31 RX ADMIN — MEROPENEM 1000 MG: 1 INJECTION INTRAVENOUS at 05:36

## 2024-01-31 RX ADMIN — LEVALBUTEROL HYDROCHLORIDE 0.63 MG: 0.63 SOLUTION RESPIRATORY (INHALATION) at 11:19

## 2024-01-31 RX ADMIN — SODIUM CHLORIDE, PRESERVATIVE FREE 10 ML: 5 INJECTION INTRAVENOUS at 09:22

## 2024-01-31 RX ADMIN — SODIUM PHOSPHATE, MONOBASIC, MONOHYDRATE AND SODIUM PHOSPHATE, DIBASIC, ANHYDROUS 10 MMOL: 142; 276 INJECTION, SOLUTION INTRAVENOUS at 09:17

## 2024-01-31 RX ADMIN — SODIUM CHLORIDE 30 MG/ML INHALATION SOLUTION 4 ML: 30 SOLUTION INHALANT at 11:19

## 2024-01-31 ASSESSMENT — PAIN SCALES - PAIN ASSESSMENT IN ADVANCED DEMENTIA (PAINAD)
BREATHING: 0
NEGVOCALIZATION: 0
BREATHING: 0
BREATHING: 0
CONSOLABILITY: 0
NEGVOCALIZATION: 0
BODYLANGUAGE: 0
BODYLANGUAGE: 0
CONSOLABILITY: 0
BREATHING: 0
NEGVOCALIZATION: 0
BREATHING: 0
FACIALEXPRESSION: 0
CONSOLABILITY: 0
BREATHING: 0
TOTALSCORE: 0
BODYLANGUAGE: 0
NEGVOCALIZATION: 0
FACIALEXPRESSION: 0
CONSOLABILITY: 0
BREATHING: 0
BREATHING: 0
FACIALEXPRESSION: 0
NEGVOCALIZATION: 0
BODYLANGUAGE: 0
BREATHING: 0
FACIALEXPRESSION: 0
FACIALEXPRESSION: 0
BODYLANGUAGE: 0
CONSOLABILITY: 0
FACIALEXPRESSION: 0
BODYLANGUAGE: 0
NEGVOCALIZATION: 0
TOTALSCORE: 0
CONSOLABILITY: 0
BREATHING: 0
CONSOLABILITY: 0
BODYLANGUAGE: 0
BODYLANGUAGE: 0
TOTALSCORE: 0
CONSOLABILITY: 0
NEGVOCALIZATION: 0
NEGVOCALIZATION: 0
TOTALSCORE: 0
BREATHING: 0
TOTALSCORE: 0
TOTALSCORE: 0
CONSOLABILITY: 0
TOTALSCORE: 0
FACIALEXPRESSION: 0
TOTALSCORE: 0
FACIALEXPRESSION: 0
FACIALEXPRESSION: 0
CONSOLABILITY: 0
TOTALSCORE: 0
TOTALSCORE: 0
FACIALEXPRESSION: 0
NEGVOCALIZATION: 0
BODYLANGUAGE: 0
BREATHING: 0
NEGVOCALIZATION: 0
FACIALEXPRESSION: 0
TOTALSCORE: 0
TOTALSCORE: 0
NEGVOCALIZATION: 0
FACIALEXPRESSION: 0
CONSOLABILITY: 0
NEGVOCALIZATION: 0
BODYLANGUAGE: 0
CONSOLABILITY: 0
NEGVOCALIZATION: 0
BREATHING: 0
CONSOLABILITY: 0
BODYLANGUAGE: 0
TOTALSCORE: 0
FACIALEXPRESSION: 0
BODYLANGUAGE: 0
BODYLANGUAGE: 0

## 2024-01-31 ASSESSMENT — PAIN SCALES - GENERAL
PAINLEVEL_OUTOF10: 0
PAINLEVEL_OUTOF10: 2
PAINLEVEL_OUTOF10: 0

## 2024-01-31 ASSESSMENT — PAIN SCALES - WONG BAKER
WONGBAKER_NUMERICALRESPONSE: 0

## 2024-01-31 NOTE — PLAN OF CARE
Discussed with daughter and other family at bedside about deterioration in condition. Family understanding of situation. Explained to family that re-occurrence of RVR is likely the culprit for worsening respiratory status. I estrella explained that we will try some more medications to help convert patient into normal rhythym/NSR. However, an option if hemodynamic compromise occurs in setting of RVR that DCCV/cardioversion is an option, to which family/daughter is amenable.     Discussed with pulmonology resident as well regarding their conversation of goals of care in which family decided to pursue Limited x3 status. Addendum to that is daughter/family woud like to avoid resuscitative medications as well, thus patient now Limited x4.    Plan:  - DNR/DNI (Limited x4)    RVR  - re-bolus amio and then resume drip  - consider Digoxin load if worsening  - avoid Lopressor/BB if possible to avoid drops in pressure  - if hemodynamic compromise, will consider cardioversion (family agreeable to DCCV)    Respiratory status  - continue NRB mask - pt is mouth breathing significantly  - vapotherm on standby for persistent sats < 88-90%  - goal is convert back into NSR.  - Pt would need ICU level care IF refractory low pressures were to occur for vasopressor support, NOT for intubation (pt is DNR/DNI)    Discussed with BRITTNEY Mc, Respiratory Debora, on all resident wards team and ICU team.     Jesus Parikh MD  PGY-2

## 2024-01-31 NOTE — PROGRESS NOTES
Clinical Pharmacy Progress Note    Vancomycin - Management by Pharmacy    Consult Date(s): 1/28/24  Consulting Provider(s): Ara Haley MD     Assessment / Plan  Severe sepsis, PNA - Vancomycin  Concurrent Antimicrobials: Meropenem  Day of Vanc Therapy / Ordered Duration: 5 of 7  Current Dosing Method: Bayesian AUC Dosing   Therapeutic Goal: -600   Current Dose / Plan:   Admitted with NYA which has improved. SCr stable at <0.5  Currently on 750mg IV q12h.  Level this AM = 10.8 mg/L - drawn ~10h after prior dose  Calculated AUC = 538 mg/L*h  Continue current dose as AUC within goal AUC  Repeat levels will be ordered as appropriate.  Will continue to monitor clinical condition and make adjustments to regimen as appropriate.    Please call with questions--  Annemarie Orozco, PharmD, [facility]  Wireless: [phone]   1/31/2024 8:44 AM        Interval update:  Remains on amiodarone and heparin gtts. Weaned to 7L HFNC this AM.     Subjective/Objective:   Zuleima Mir is a 83 y.o. female with a PMHx significant for COPD, T2DM, HTN, who presented to ED with AMS. On presentation, pt was noted to be in AFib, CXR showed extensive LLL and patchy RLL consolidation consistent with PNA. Admitted with sepsis, PNA, influenza and NYA.    Pharmacy is consulted to manage vancomycin.    Ht Readings from Last 1 Encounters:   01/28/24 1.575 m (5' 2\")     Wt Readings from Last 1 Encounters:   01/31/24 44.3 kg (97 lb 11.2 oz)     Current & Prior Antimicrobial Regimen(s):  Meropenem (1/28-current)  Vancomycin - Pharmacy to dose   (1/29-1/30)  750mg IV q12h (1/30-current)    Vancomycin Level(s) / Doses:  Date Time Dose Type of Level / Level Interpretation   1/29 05:48 500mg IV q24h Random = 13.6 mg/L Drawn ~3h after prior dose  Difficult to interpret level - likely still in distributive phase   1/30 06:25 750mg IV q18h Random = 20.5 mg/L Drawn ~30 min after prior dose hung (while dose infusing) -- inaccurate level    1/31 04:49 750mg IV q12h  Random = 13 mg/L Drawn ~10h after prior dose  AUC = 538 mg/L*h  Continue current dose   Note: Serum levels collected for AUC-based dosing may be high if collected in close proximity to the dose administered. This is not necessarily indicative of toxicity.    Cultures & Sensitivities:    Date Site Micro Susceptibility / Result   1/28 MRSA nares Sent    1/28 Blood  Staph Epi x 1 bottle    1/28 Covid/Flu Influenza A    1/28 Resp Cx Sent    1/28 Strep/Legionella Sent    1/28 PNA panel Sent      Recent Labs     01/28/24  1049 01/29/24  0548 01/29/24  0548 01/30/24  0625 01/30/24  1834 01/31/24  0449 01/31/24  0539   CREATININE  --  0.6   < > <0.5* 0.5* <0.5*  --    BUN  --  47*   < > 23* 19 16  --    WBC 7.1 7.4  --   --   --   --  11.6*    < > = values in this interval not displayed.         Estimated Creatinine Clearance: 58 mL/min (based on SCr of 0.5 mg/dL).    Additional Lab Values / Findings of Note:    Recent Labs     01/28/24  1049   PROCAL 8.64*

## 2024-01-31 NOTE — PROGRESS NOTES
Speech pathology  Attempt    Chart reviewed, d/w RN. Attempted to re-assess pt, however pt declined all PO trials. Offered other options, pt stating she did not want anything, and to leave her alone. Family member present, d/w her after attempts. Will follow up as pt participates and schedule allows      ABE SANDOVAL M.S./CCC-SLP #[id number]  Pg. # [phone]

## 2024-01-31 NOTE — PLAN OF CARE
Discussed goals of care with daughter at bedside. Discussed patients condition, overall clinical picture and concern for multiple respiratory pathologies occurring at same time (influenza, likely bacterial pna, subsegmental PE, mucus plugging).    Daughter aware of patient's critical condition and endorses understanding. Daughter at this time states full code but is continuously thinking about code status, but is understandably struggling to make any confirmed decisions.     Pt reports understanding options including DNR with option to intubate before arrest (CCA), DNR/DNI, and is aware of hospice as she previously experienced this field of medicine when involved in the care of her grandmother who went to hospice in the past.     - will involve and discuss case with palliative care.     Jesus Parikh MD  PGY-2

## 2024-01-31 NOTE — SIGNIFICANT EVENT
Patient seen at bedside with Dr. Molina. She was on 15 L Supplementation and was noted to be using accessory muscles with tracheal tugging. Dr. Molina discussed the code status with patient's daughter Alanna at bedside who reports to be the POA. She stated that she would not want her mother to get intubated, or to have chest compressions or shocks but was agreeable to using resuscitative medications like vasopressor support and getting central access if it came to that point. We did discuss that sometimes there is an inter-dependency of the aggressive measures and will have the primary team keep her posted regarding the patient's clinical condition. Palliative care is consulted already. Will update the team.       Stew Garcia MD  PGY-3, Internal Medicine

## 2024-01-31 NOTE — PROGRESS NOTES
Progress Note    Admit Date: 1/28/2024  Day: 3  Diet: Diet NPO Exceptions are: Sips of Water with Meds    CC: AMS, fatigue    Interval history:   NAEO. O2 requirements have reduced, on 7 L this am. Had 5 L out yesterday with lasix push. Interactions slightly improved today, able to answer simple questions with frequent reinforcement.     HPI:   Zuleima Mir is a 83 y.o. female, presented with AMS. Patient has a PMHx of   IBS, HTN, HLD, allergic rhinitis, DM2.  Patient is too altered to converse however her daughter was in the room to provide information.  She mentions that over the past few weeks her mother started becoming more fatigued, weak, altered, had been eating less, and developed a productive cough.  She is unsure if it is a thick or thin sputum or its color.  She said that she had become so weak that her legs gave out and she was not able to walk.  She wanted to bring her to the hospital sooner however her mother denied.     At the ED, , /52.  She was found to have atrial fibrillation on monitor and her daughter denies her ever having atrial fibrillation nor it is not seen on her chart.  She was started on a Dilt gtt which improved her heart rate to ~140-160s and was given a bolus of 1.3L of LR which improved her BP as well.  Her labs were pertinent for creatinine of 2.0 which is elevated from her baseline of around 0.8, LA 3.4-> 12.7, troponin 31.  She is influenza A positive.  CXR showed extensive left lower lung and patchy right lower lung consolidation most compatible with multifocal pneumonia.     She will be admitted to [facility] to further manage her septic pneumonia and NYA.    Medications:     Scheduled Meds:   vancomycin  750 mg IntraVENous Q12H    meropenem  1,000 mg IntraVENous Q8H    sodium chloride (Inhalant)  4 mL Nebulization 4x Daily    balsum peru-castor oil   Topical BID    budesonide  0.25 mg Nebulization BID RT    sodium chloride flush  5-40 mL IntraVENous 2 times per day     status:  Full code  ELOS: 3 days  Barriers to discharge: severe sepsis  Disposition  - Preadmission: Home  - Current: IP  - Upon discharge: Home    Richard Tamayo MD, PGY-1  01/31/24  12:24 PM    This patient has been staffed and discussed with Candelario Awad MD.    Patient seen and examined, labs and imaging studies reviewed, agree with assessment and plan as outlined above.  Continue with current care and plan.  Discussed case with patients nurse, discussed case with care team, discussed plan.      Candelario Awad MD FACP

## 2024-01-31 NOTE — PLAN OF CARE
Problem: Discharge Planning  Goal: Discharge to home or other facility with appropriate resources  Outcome: Progressing  Flowsheets (Taken 1/31/2024 1700)  Discharge to home or other facility with appropriate resources:   Identify barriers to discharge with patient and caregiver   Arrange for needed discharge resources and transportation as appropriate   Identify discharge learning needs (meds, wound care, etc)     Problem: Pain  Goal: Verbalizes/displays adequate comfort level or baseline comfort level  Outcome: Progressing  Flowsheets (Taken 1/31/2024 1700)  Verbalizes/displays adequate comfort level or baseline comfort level:   Encourage patient to monitor pain and request assistance   Assess pain using appropriate pain scale   Administer analgesics based on type and severity of pain and evaluate response   Implement non-pharmacological measures as appropriate and evaluate response  Note: Patient is unable to verbalize pain, utilizing non-verbal adult pain scale and cramer mcginnis faces pain scale as alternative.     Problem: Safety - Adult  Goal: Free from fall injury  Outcome: Progressing  Flowsheets (Taken 1/31/2024 1700)  Free From Fall Injury: Instruct family/caregiver on patient safety  Note: Pt will remain free from accidental injury this shift. Pt has fall risk measures (fall risk bracelet, fall risk sign, fall risk cloth, non-slip socks, dome light on) in place. Pt bed is in low position, bed alarm on, bed wheels locked, call light within reach, bedside table within reach, chair wheels locked, chair alarm on.        Problem: Cardiovascular - Adult  Goal: Maintains optimal cardiac output and hemodynamic stability  Outcome: Progressing  Flowsheets (Taken 1/31/2024 1700)  Maintains optimal cardiac output and hemodynamic stability:   Monitor blood pressure and heart rate   Monitor urine output and notify Licensed Independent Practitioner for values outside of normal range   Assess for signs of decreased

## 2024-01-31 NOTE — PROGRESS NOTES
Pulmonary Followup Note    CC: PNA, respiratory failure  Subjective:  No acute overnight events. Supplemental oxygen requirements have improved to 7L.  She is not as interactive as she was yesterday. Breath sounds show improvement in the rhonchi.     ROS:  Denies headache, nausea or chest pain.    24HR INTAKE/OUTPUT:    Intake/Output Summary (Last 24 hours) at 2024 0937  Last data filed at 2024 0922  Gross per 24 hour   Intake 10 ml   Output 3900 ml   Net -3890 ml        sodium phosphate IVPB (PERIPHERAL line)  10 mmol IntraVENous Once    vancomycin  750 mg IntraVENous Q12H    meropenem  1,000 mg IntraVENous Q8H    sodium chloride (Inhalant)  4 mL Nebulization 4x Daily    balsum peru-castor oil   Topical BID    budesonide  0.25 mg Nebulization BID RT    sodium chloride flush  5-40 mL IntraVENous 2 times per day    levalbuterol  0.63 mg Nebulization Q4H WA RT    ipratropium  0.5 mg Nebulization 4x Daily RT    pantoprazole (PROTONIX) 40 mg in sodium chloride (PF) 0.9 % 10 mL injection  40 mg IntraVENous BID           PHYSICAL EXAMINATION:  BP (!) 154/53   Pulse 79   Temp 99 °F (37.2 °C) (Bladder)   Resp 30   Ht 1.575 m (5' 2\")   Wt 44.3 kg (97 lb 11.2 oz)   SpO2 98%   BMI 17.87 kg/m²   CURRENT PULSE OXIMETRY:  SpO2: 98 %  24HR PULSE OXIMETRY RANGE:  SpO2  Av.4 %  Min: 87 %  Max: 99 %       Gen: No acute distress. Speaking without the use of accessory muscles.   HEENT: PERRL, EOMI, OP nl  Lung:  Bilateral rhonchi  CV: RRR without M/R/R  Abd: +BS, soft, NT/ND  Ext: No edema.    DATA  CBC:   Recent Labs     24  1049 24  1916 24  0548 24  1322 24  1834 24  0449 24  0539   WBC 7.1  --  7.4  --   --   --  11.6*   HGB 13.9   < > 13.7   < > 12.2 13.5 12.1   HCT 41.9   < > 40.6   < > 35.9* 39.8 35.8*   MCV 95.7  --  95.4  --   --   --  94.1   *  --  124*  --   --   --  109*    < > = values in this interval not

## 2024-01-31 NOTE — PLAN OF CARE
Problem: Safety - Medical Restraint  Goal: Remains free of injury from restraints (Restraint for Interference with Medical Device)  Description: INTERVENTIONS:  1. Determine that other, less restrictive measures have been tried or would not be effective before applying the restraint  2. Evaluate the patient's condition at the time of restraint application  3. Inform patient/family regarding the reason for restraint  4. Q2H: Monitor safety, psychosocial status, comfort, nutrition and hydration  Outcome: Progressing  Flowsheets  Taken 1/31/2024 4532 by Theodora Gonzales RN  Remains free of injury from restraints (restraint for interference with medical device):   Determine that other, less restrictive measures have been tried or would not be effective before applying the restraint   Evaluate the patient's condition at the time of restraint application   Inform patient/family regarding the reason for restraint   Every 2 hours: Monitor safety, psychosocial status, comfort, nutrition and hydration  Note: Patient in restraints due to pulling at lines/tubes related to patient confusion, less restrictive measures being evaluated as patient mentation changes

## 2024-01-31 NOTE — CONSULTS
[facility]  Palliative Medicine Consultation Note      Date Of Admission:1/28/2024  Date of consult: 01/31/24  Seen by PC in the past:  No    Recommendations:        Goals of care discussion was initiated this morning by the primary team with the patient's daughter, Martina Roman.  Martina is the official POA and has paperwork that she plans on bringing into the hospital later today or tomorrow.  She has two other siblings who are estranged from the family, but one sister did visit the patient yesterday.        I introduced palliative care and brought up a conversation about code status.  I explained to the daughter side effects of CPR could include potentially broken ribs which may puncture lungs and that the patient would require a ventilator immediately after.  Daughter said based on conversations in the past, her mother would never want to live relying on machines long term, but she is not yet ready to change the code status from full code.  She said she would be interested in talking again in the morning after having some time to think things over this evening.  She does not have any other family or friends helping her make these decisions so she said it will take her some time to process her thoughts.      I provided some additional information about what hospice care would entail and how the focus would be on patient's comfort.  Daughter was responsive to hear the details of this but isn't sure yet if she would like to proceed with that after this hospitalization.       As patient has been gradually declining over weeks and was so weak before coming in that she couldn't stand, I brought up a conversation about if she would be able to care for her at home going forward.  Daughter said that if patient didn't recover enough this hospitalization and needed around the clock care in the home, that would be too expensive for her to pay for.  Per the primary team, patient is starting to

## 2024-01-31 NOTE — PROGRESS NOTES
Cardiology Consult Service  Daily Progress Note        Admit Date:  1/28/2024  Primary cardiologist: Dr Rosales, new     Reason for Consultation/Chief Complaint: PAFRVR    Subjective:      Zuleima Mir is a 83 y.o. female with a past medical history of smoker, COPD not on oxygen, IBS, HTN, HLP, DM, on chronic benzodiazepines and opioids.     Patient presented on 1/28 with decreased p.o. intake, altered mental status and fatigue with cough over the last few weeks.  She was hypoxic, febrile (Tmax 100.4), severely tachycardic 180s with low normal BP, requiring 15 L of supplemental oxygen for adequate saturation.  Creatinine 2.0, chest x-ray consistent with multifocal pneumonia.  ECG consistent with  bpm, nonspecific changes.  She was admitted for sepsis due to community-acquired pneumonia, NYA, PAF RVR, influenza infection.  She was initially placed on diltiazem drip and was given digoxin 500 mcg IV x 1 along with IV antibiotics.  She received LR 2 L as a bolus and was placed on  mill per hour.  Cardiology was consulted.  Patient subsequently became hypotensive and diltiazem drip was changed to amiodarone drip after discussing the case with me.     Patient currently appears hemodynamically stable.  Telemetry personally reviewed, reveals normal sinus rhythm rates in the 80s.  She is now requiring only 5 L of supplemental oxygen.  Heparin drip was held by primary team due to positive Hemoccult and GI was consulted.  Hemoglobin down to 13.9 from 16.8 (I suspect hemodilution after receiving IV fluids).  Patient appears confused, does not report any complaints.    CTA chest 1/20/2024 negative for PE, mucous plugging and bronchial thickening and bronchiectasis mostly in the lower lobes noted.    Echo 1/30/2024: Normal LV, LVEF 60%, mild aortic stenosis, mild to moderate MR, normal filling pressures.    Interval history:  Patient continues on 8 L of supplemental oxygen.  BP elevated today.  Creatinine normal at 0.5,  images of labs, radiological studies, cardiac studies including ECG's and telemetry, current and old medical records. The note was completed using EMR and Dragon dictation system. Every effort was made to ensure accuracy; however, inadvertent computerized transcription errors may be present.    All questions and concerns were addressed to the patient/family. Alternatives to my treatment were discussed.     Thank you for allowing to us to participate in the care or Zuleima Mir. Please call our service with questions.    Karan Rosales MD, [facility], [facility]  [facility] [place] [address]  Ph: [phone]  Fax: [phone]

## 2024-01-31 NOTE — CARE COORDINATION
Case Management Assessment  Initial Evaluation    Date/Time of Evaluation: 1/31/2024 4:32 PM  Assessment Completed by: Monica Weston    If patient is discharged prior to next notation, then this note serves as note for discharge by case management.    Patient Name: Zuleima Mir                   YOB: 1940  Diagnosis: Septicemia (HCC) [A41.9]  Influenza A [J10.1]  Atrial fibrillation with RVR (HCC) [I48.91]  Acute kidney injury (HCC) [N17.9]  Severe sepsis (HCC) [A41.9, R65.20]  Pneumonia of both lower lobes due to infectious organism [J18.9]  Acute hypoxic respiratory failure (HCC) [J96.01]                   Date / Time: 1/28/2024  2:29 AM    Patient Admission Status: Inpatient   Readmission Risk (Low < 19, Mod (19-27), High > 27): Readmission Risk Score: 12.3    Current PCP: Van Neal  PCP verified by CM? No    Chart Reviewed: Yes      History Provided by: Medical Record, Other (see comment) (PC NP)  Patient Orientation: Unable to Assess    Patient Cognition: Severely Impaired    Hospitalization in the last 30 days (Readmission):  No    If yes, Readmission Assessment in CM Navigator will be completed.    Advance Directives:      Code Status: Limited   Patient's Primary Decision Maker is: Legal Next of Kin      Discharge Planning:    Patient lives with: Children Type of Home: House  Primary Care Giver: Family  Patient Support Systems include: Family Members   Current Financial resources: Medicare  Current community resources: None  Current services prior to admission: None            Current DME:              Type of Home Care services:  None    ADLS  Prior functional level: Assistance with the following:, Bathing, Dressing, Toileting, Cooking, Housework, Shopping, Mobility  Current functional level: Assistance with the following:, Bathing, Dressing, Toileting, Cooking, Housework, Feeding, Shopping, Mobility    PT AM-PAC:   /24  OT AM-PAC:   /24    Family can provide assistance at DC:

## 2024-02-01 ENCOUNTER — APPOINTMENT (OUTPATIENT)
Dept: GENERAL RADIOLOGY | Age: 84
DRG: 871 | End: 2024-02-01
Payer: MEDICARE

## 2024-02-01 LAB
ALBUMIN SERPL-MCNC: 1.9 G/DL (ref 3.4–5)
ALBUMIN SERPL-MCNC: 2 G/DL (ref 3.4–5)
ALBUMIN/GLOB SERPL: 0.5 {RATIO} (ref 1.1–2.2)
ALP SERPL-CCNC: 101 U/L (ref 40–129)
ALT SERPL-CCNC: 34 U/L (ref 10–40)
ANION GAP SERPL CALCULATED.3IONS-SCNC: 11 MMOL/L (ref 3–16)
ANION GAP SERPL CALCULATED.3IONS-SCNC: 9 MMOL/L (ref 3–16)
ANTI-XA UNFRAC HEPARIN: <0.1 IU/ML (ref 0.3–0.7)
APTT BLD: 48.1 SEC (ref 22.7–35.9)
AST SERPL-CCNC: 51 U/L (ref 15–37)
BACTERIA BLD CULT ORG #2: NORMAL
BASOPHILS # BLD: 0 K/UL (ref 0–0.2)
BASOPHILS NFR BLD: 0 %
BILIRUB SERPL-MCNC: 0.7 MG/DL (ref 0–1)
BUN SERPL-MCNC: 21 MG/DL (ref 7–20)
BUN SERPL-MCNC: 23 MG/DL (ref 7–20)
CALCIUM SERPL-MCNC: 8.2 MG/DL (ref 8.3–10.6)
CALCIUM SERPL-MCNC: 8.3 MG/DL (ref 8.3–10.6)
CHLORIDE SERPL-SCNC: 103 MMOL/L (ref 99–110)
CHLORIDE SERPL-SCNC: 103 MMOL/L (ref 99–110)
CO2 SERPL-SCNC: 30 MMOL/L (ref 21–32)
CO2 SERPL-SCNC: 32 MMOL/L (ref 21–32)
CREAT SERPL-MCNC: 0.5 MG/DL (ref 0.6–1.2)
CREAT SERPL-MCNC: <0.5 MG/DL (ref 0.6–1.2)
DEPRECATED RDW RBC AUTO: 13.9 % (ref 12.4–15.4)
EKG ATRIAL RATE: 312 BPM
EKG DIAGNOSIS: NORMAL
EKG Q-T INTERVAL: 246 MS
EKG QRS DURATION: 78 MS
EKG QTC CALCULATION (BAZETT): 356 MS
EKG R AXIS: -76 DEGREES
EKG T AXIS: 113 DEGREES
EKG VENTRICULAR RATE: 126 BPM
EOSINOPHIL # BLD: 0 K/UL (ref 0–0.6)
EOSINOPHIL NFR BLD: 0 %
GFR SERPLBLD CREATININE-BSD FMLA CKD-EPI: >60 ML/MIN/{1.73_M2}
GFR SERPLBLD CREATININE-BSD FMLA CKD-EPI: >60 ML/MIN/{1.73_M2}
GLUCOSE SERPL-MCNC: 122 MG/DL (ref 70–99)
GLUCOSE SERPL-MCNC: 95 MG/DL (ref 70–99)
HCT VFR BLD AUTO: 33.9 % (ref 36–48)
HGB BLD-MCNC: 11.7 G/DL (ref 12–16)
LYMPHOCYTES # BLD: 0.4 K/UL (ref 1–5.1)
LYMPHOCYTES NFR BLD: 3.4 %
MAGNESIUM SERPL-MCNC: 1.7 MG/DL (ref 1.8–2.4)
MAGNESIUM SERPL-MCNC: 1.9 MG/DL (ref 1.8–2.4)
MCH RBC QN AUTO: 32.4 PG (ref 26–34)
MCHC RBC AUTO-ENTMCNC: 34.5 G/DL (ref 31–36)
MCV RBC AUTO: 93.9 FL (ref 80–100)
MONOCYTES # BLD: 0.3 K/UL (ref 0–1.3)
MONOCYTES NFR BLD: 3 %
NEUTROPHILS # BLD: 10.3 K/UL (ref 1.7–7.7)
NEUTROPHILS NFR BLD: 93.6 %
NT-PROBNP SERPL-MCNC: 4270 PG/ML (ref 0–449)
PHOSPHATE SERPL-MCNC: 2.7 MG/DL (ref 2.5–4.9)
PHOSPHATE SERPL-MCNC: 3.1 MG/DL (ref 2.5–4.9)
PLATELET # BLD AUTO: 102 K/UL (ref 135–450)
PMV BLD AUTO: 8.9 FL (ref 5–10.5)
POTASSIUM SERPL-SCNC: 2.6 MMOL/L (ref 3.5–5.1)
POTASSIUM SERPL-SCNC: 3.5 MMOL/L (ref 3.5–5.1)
PROT SERPL-MCNC: 5.7 G/DL (ref 6.4–8.2)
RBC # BLD AUTO: 3.62 M/UL (ref 4–5.2)
SODIUM SERPL-SCNC: 144 MMOL/L (ref 136–145)
SODIUM SERPL-SCNC: 144 MMOL/L (ref 136–145)
WBC # BLD AUTO: 11 K/UL (ref 4–11)

## 2024-02-01 PROCEDURE — 2700000000 HC OXYGEN THERAPY PER DAY

## 2024-02-01 PROCEDURE — 6360000002 HC RX W HCPCS

## 2024-02-01 PROCEDURE — 71045 X-RAY EXAM CHEST 1 VIEW: CPT

## 2024-02-01 PROCEDURE — C9113 INJ PANTOPRAZOLE SODIUM, VIA: HCPCS

## 2024-02-01 PROCEDURE — 85520 HEPARIN ASSAY: CPT

## 2024-02-01 PROCEDURE — 2580000003 HC RX 258

## 2024-02-01 PROCEDURE — A4216 STERILE WATER/SALINE, 10 ML: HCPCS

## 2024-02-01 PROCEDURE — 93010 ELECTROCARDIOGRAM REPORT: CPT | Performed by: INTERNAL MEDICINE

## 2024-02-01 PROCEDURE — 94640 AIRWAY INHALATION TREATMENT: CPT

## 2024-02-01 PROCEDURE — 94761 N-INVAS EAR/PLS OXIMETRY MLT: CPT

## 2024-02-01 PROCEDURE — 99291 CRITICAL CARE FIRST HOUR: CPT | Performed by: INTERNAL MEDICINE

## 2024-02-01 PROCEDURE — 83735 ASSAY OF MAGNESIUM: CPT

## 2024-02-01 PROCEDURE — 84100 ASSAY OF PHOSPHORUS: CPT

## 2024-02-01 PROCEDURE — 85730 THROMBOPLASTIN TIME PARTIAL: CPT

## 2024-02-01 PROCEDURE — 99232 SBSQ HOSP IP/OBS MODERATE 35: CPT | Performed by: INTERNAL MEDICINE

## 2024-02-01 PROCEDURE — 85025 COMPLETE CBC W/AUTO DIFF WBC: CPT

## 2024-02-01 PROCEDURE — 83880 ASSAY OF NATRIURETIC PEPTIDE: CPT

## 2024-02-01 PROCEDURE — 80053 COMPREHEN METABOLIC PANEL: CPT

## 2024-02-01 PROCEDURE — 36415 COLL VENOUS BLD VENIPUNCTURE: CPT

## 2024-02-01 PROCEDURE — 2060000000 HC ICU INTERMEDIATE R&B

## 2024-02-01 PROCEDURE — 6360000002 HC RX W HCPCS: Performed by: INTERNAL MEDICINE

## 2024-02-01 RX ORDER — FUROSEMIDE 10 MG/ML
20 INJECTION INTRAMUSCULAR; INTRAVENOUS 3 TIMES DAILY
Status: DISCONTINUED | OUTPATIENT
Start: 2024-02-01 | End: 2024-02-02

## 2024-02-01 RX ORDER — FUROSEMIDE 10 MG/ML
20 INJECTION INTRAMUSCULAR; INTRAVENOUS ONCE
Status: COMPLETED | OUTPATIENT
Start: 2024-02-01 | End: 2024-02-01

## 2024-02-01 RX ORDER — FUROSEMIDE 10 MG/ML
20 INJECTION INTRAMUSCULAR; INTRAVENOUS 3 TIMES DAILY
Status: DISCONTINUED | OUTPATIENT
Start: 2024-02-01 | End: 2024-02-01

## 2024-02-01 RX ORDER — POTASSIUM CHLORIDE 7.45 MG/ML
10 INJECTION INTRAVENOUS
Status: DISCONTINUED | OUTPATIENT
Start: 2024-02-01 | End: 2024-02-01

## 2024-02-01 RX ORDER — HYDROXYZINE HYDROCHLORIDE 10 MG/1
10 TABLET, FILM COATED ORAL ONCE
Status: DISCONTINUED | OUTPATIENT
Start: 2024-02-02 | End: 2024-02-03

## 2024-02-01 RX ORDER — POTASSIUM CHLORIDE 7.45 MG/ML
10 INJECTION INTRAVENOUS
Status: DISPENSED | OUTPATIENT
Start: 2024-02-01 | End: 2024-02-01

## 2024-02-01 RX ORDER — MAGNESIUM SULFATE IN WATER 40 MG/ML
2000 INJECTION, SOLUTION INTRAVENOUS ONCE
Status: COMPLETED | OUTPATIENT
Start: 2024-02-01 | End: 2024-02-01

## 2024-02-01 RX ADMIN — METHYLPREDNISOLONE SODIUM SUCCINATE 40 MG: 40 INJECTION, POWDER, LYOPHILIZED, FOR SOLUTION INTRAMUSCULAR; INTRAVENOUS at 18:49

## 2024-02-01 RX ADMIN — AMIODARONE HYDROCHLORIDE 0.5 MG/MIN: 50 INJECTION, SOLUTION INTRAVENOUS at 00:49

## 2024-02-01 RX ADMIN — MEROPENEM 1000 MG: 1 INJECTION INTRAVENOUS at 12:39

## 2024-02-01 RX ADMIN — Medication: at 09:33

## 2024-02-01 RX ADMIN — SODIUM CHLORIDE, PRESERVATIVE FREE 10 ML: 5 INJECTION INTRAVENOUS at 09:32

## 2024-02-01 RX ADMIN — POTASSIUM CHLORIDE 10 MEQ: 10 INJECTION, SOLUTION INTRAVENOUS at 18:42

## 2024-02-01 RX ADMIN — FUROSEMIDE 20 MG: 10 INJECTION, SOLUTION INTRAMUSCULAR; INTRAVENOUS at 17:23

## 2024-02-01 RX ADMIN — METHYLPREDNISOLONE SODIUM SUCCINATE 40 MG: 40 INJECTION, POWDER, LYOPHILIZED, FOR SOLUTION INTRAMUSCULAR; INTRAVENOUS at 10:25

## 2024-02-01 RX ADMIN — HEPARIN SODIUM 29 UNITS/KG/HR: 10000 INJECTION, SOLUTION INTRAVENOUS at 10:38

## 2024-02-01 RX ADMIN — SODIUM CHLORIDE, PRESERVATIVE FREE 40 MG: 5 INJECTION INTRAVENOUS at 09:31

## 2024-02-01 RX ADMIN — POTASSIUM CHLORIDE 10 MEQ: 10 INJECTION, SOLUTION INTRAVENOUS at 15:58

## 2024-02-01 RX ADMIN — POTASSIUM CHLORIDE 10 MEQ: 10 INJECTION, SOLUTION INTRAVENOUS at 09:26

## 2024-02-01 RX ADMIN — HEPARIN SODIUM 1320 UNITS: 1000 INJECTION INTRAVENOUS; SUBCUTANEOUS at 01:44

## 2024-02-01 RX ADMIN — MEROPENEM 1000 MG: 1 INJECTION INTRAVENOUS at 04:21

## 2024-02-01 RX ADMIN — IPRATROPIUM BROMIDE 0.5 MG: 0.5 SOLUTION RESPIRATORY (INHALATION) at 08:36

## 2024-02-01 RX ADMIN — POTASSIUM CHLORIDE 10 MEQ: 10 INJECTION, SOLUTION INTRAVENOUS at 10:25

## 2024-02-01 RX ADMIN — IPRATROPIUM BROMIDE 0.5 MG: 0.5 SOLUTION RESPIRATORY (INHALATION) at 22:31

## 2024-02-01 RX ADMIN — IPRATROPIUM BROMIDE 0.5 MG: 0.5 SOLUTION RESPIRATORY (INHALATION) at 11:35

## 2024-02-01 RX ADMIN — BUDESONIDE 250 MCG: 0.25 INHALANT RESPIRATORY (INHALATION) at 22:31

## 2024-02-01 RX ADMIN — POTASSIUM CHLORIDE 10 MEQ: 10 INJECTION, SOLUTION INTRAVENOUS at 13:39

## 2024-02-01 RX ADMIN — SODIUM CHLORIDE 30 MG/ML INHALATION SOLUTION 4 ML: 30 SOLUTION INHALANT at 08:37

## 2024-02-01 RX ADMIN — LEVALBUTEROL HYDROCHLORIDE 0.63 MG: 0.63 SOLUTION RESPIRATORY (INHALATION) at 08:37

## 2024-02-01 RX ADMIN — POTASSIUM CHLORIDE 10 MEQ: 10 INJECTION, SOLUTION INTRAVENOUS at 17:22

## 2024-02-01 RX ADMIN — POTASSIUM CHLORIDE 10 MEQ: 10 INJECTION, SOLUTION INTRAVENOUS at 12:32

## 2024-02-01 RX ADMIN — LEVALBUTEROL HYDROCHLORIDE 0.63 MG: 0.63 SOLUTION RESPIRATORY (INHALATION) at 22:31

## 2024-02-01 RX ADMIN — POTASSIUM CHLORIDE 10 MEQ: 10 INJECTION, SOLUTION INTRAVENOUS at 14:54

## 2024-02-01 RX ADMIN — SODIUM CHLORIDE, PRESERVATIVE FREE 10 ML: 5 INJECTION INTRAVENOUS at 20:15

## 2024-02-01 RX ADMIN — AMIODARONE HYDROCHLORIDE 0.5 MG/MIN: 50 INJECTION, SOLUTION INTRAVENOUS at 21:52

## 2024-02-01 RX ADMIN — VANCOMYCIN HYDROCHLORIDE 750 MG: 10 INJECTION, POWDER, LYOPHILIZED, FOR SOLUTION INTRAVENOUS at 18:51

## 2024-02-01 RX ADMIN — BUDESONIDE 250 MCG: 0.25 INHALANT RESPIRATORY (INHALATION) at 08:36

## 2024-02-01 RX ADMIN — FUROSEMIDE 20 MG: 10 INJECTION, SOLUTION INTRAMUSCULAR; INTRAVENOUS at 21:15

## 2024-02-01 RX ADMIN — FUROSEMIDE 20 MG: 10 INJECTION, SOLUTION INTRAMUSCULAR; INTRAVENOUS at 09:31

## 2024-02-01 RX ADMIN — HEPARIN SODIUM 2640 UNITS: 1000 INJECTION INTRAVENOUS; SUBCUTANEOUS at 10:38

## 2024-02-01 RX ADMIN — MAGNESIUM SULFATE HEPTAHYDRATE 2000 MG: 40 INJECTION, SOLUTION INTRAVENOUS at 18:49

## 2024-02-01 RX ADMIN — VANCOMYCIN HYDROCHLORIDE 750 MG: 10 INJECTION, POWDER, LYOPHILIZED, FOR SOLUTION INTRAVENOUS at 05:53

## 2024-02-01 RX ADMIN — SODIUM CHLORIDE, PRESERVATIVE FREE 40 MG: 5 INJECTION INTRAVENOUS at 21:15

## 2024-02-01 RX ADMIN — IPRATROPIUM BROMIDE 0.5 MG: 0.5 SOLUTION RESPIRATORY (INHALATION) at 15:13

## 2024-02-01 RX ADMIN — HEPARIN SODIUM 33 UNITS/KG/HR: 10000 INJECTION, SOLUTION INTRAVENOUS at 22:18

## 2024-02-01 RX ADMIN — LEVALBUTEROL HYDROCHLORIDE 0.63 MG: 0.63 SOLUTION RESPIRATORY (INHALATION) at 15:13

## 2024-02-01 RX ADMIN — MEROPENEM 1000 MG: 1 INJECTION INTRAVENOUS at 21:13

## 2024-02-01 RX ADMIN — LEVALBUTEROL HYDROCHLORIDE 0.63 MG: 0.63 SOLUTION RESPIRATORY (INHALATION) at 11:35

## 2024-02-01 ASSESSMENT — PAIN SCALES - GENERAL
PAINLEVEL_OUTOF10: 0

## 2024-02-01 NOTE — PROGRESS NOTES
Cardiology Consult Service  Daily Progress Note        Admit Date:  1/28/2024  Primary cardiologist: Dr Rosales, new     Reason for Consultation/Chief Complaint: PAFRVR    Subjective:      Zuleima Mir is a 83 y.o. female with a past medical history of smoker, COPD not on oxygen, IBS, HTN, HLP, DM, on chronic benzodiazepines and opioids.     Patient presented on 1/28 with decreased p.o. intake, altered mental status and fatigue with cough over the last few weeks.  She was hypoxic, febrile (Tmax 100.4), severely tachycardic 180s with low normal BP, requiring 15 L of supplemental oxygen for adequate saturation.  Creatinine 2.0, chest x-ray consistent with multifocal pneumonia.  ECG consistent with  bpm, nonspecific changes.  She was admitted for sepsis due to community-acquired pneumonia, NYA, PAF RVR, influenza infection.  She was initially placed on diltiazem drip and was given digoxin 500 mcg IV x 1 along with IV antibiotics.  She received LR 2 L as a bolus and was placed on  mill per hour.  Cardiology was consulted.  Patient subsequently became hypotensive and diltiazem drip was changed to amiodarone drip after discussing the case with me.     Patient currently appears hemodynamically stable.  Telemetry personally reviewed, reveals normal sinus rhythm rates in the 80s.  She is now requiring only 5 L of supplemental oxygen.  Heparin drip was held by primary team due to positive Hemoccult and GI was consulted.  Hemoglobin down to 13.9 from 16.8 (I suspect hemodilution after receiving IV fluids).  Patient appears confused, does not report any complaints.    CTA chest 1/20/2024 negative for PE, mucous plugging and bronchial thickening and bronchiectasis mostly in the lower lobes noted.    Echo 1/30/2024: Normal LV, LVEF 60%, mild aortic stenosis, mild to moderate MR, normal filling pressures.    Interval history:  Patient continues on 12 L of supplemental oxygen.  Creatinine stable 0.5, sodium 144, bicarb 32,  Normocephalic, without obvious abnormality, atraumatic   Neck: Supple, symmetrical, trachea midline, no adenopathy, thyroid: not enlarged, symmetric, no tenderness/mass/nodules, no carotid bruit.    Lungs:   Normal respiratory rate, lungs with ronchi bilaterally.    Chest Wall:  No tenderness or deformity   Heart:  Regular rhythm, rate is controlled, S1, S2 normal, there is no murmur, there is no rub or gallop, cannot assess jvd, no bilateral lower extremity edema   Abdomen:   Soft, non-tender, bowel sounds active all four quadrants,  no masses, no organomegaly   Extremities: Extremities normal, atraumatic, no cyanosis.    Pulses: 2+ and symmetric   Skin: Skin color, texture, turgor normal, no rashes or lesions   Pysch: Normal mood and affect   Neurologic: Normal gross motor and sensory exam.  Cranial nerves intact       Labs:   Recent Labs     01/30/24  1834 01/31/24 0449 02/01/24  0551   * 141 144   K 3.1* 4.1 2.6*   BUN 19 16 21*   CREATININE 0.5* <0.5* <0.5*    105 103   CO2 25 21 32   GLUCOSE 158* 147* 95   CALCIUM 8.3 8.3 8.3   MG 1.60* 2.20 1.90       Recent Labs     01/31/24  0449 01/31/24  0539 01/31/24  0539 02/01/24  0551   WBC  --  11.6*  --  11.0   HGB 13.5 12.1  --  11.7*   HCT 39.8 35.8*  --  33.9*   PLT  --  109*  --  102*   MCV  --  94.1   < > 93.9    < > = values in this interval not displayed.       No results for input(s): \"CHOLTOT\", \"TRIG\", \"HDL\", \"CHOLHDL\", \"LDL\" in the last 72 hours.    Invalid input(s): \"LIPIDCOMM\", \"VLDCHOL\"  No results for input(s): \"PTT\", \"INR\" in the last 72 hours.    Invalid input(s): \"PT\"    No results for input(s): \"CKTOTAL\", \"CKMB\", \"CKMBINDEX\", \"TROPONINI\" in the last 72 hours.  No results for input(s): \"BNP\" in the last 72 hours.  No results for input(s): \"NTPROBNP\" in the last 72 hours.  No results for input(s): \"TSH\" in the last 72 hours.    Imaging:       Assessment & Plan:     1.  Sepsis due to community-acquired pneumonia.  On IV antibiotics per

## 2024-02-01 NOTE — PROGRESS NOTES
Pulmonary Followup Note    CC: PNA, respiratory failure  Subjective:  No acute overnight events. Fluctuating oxygen requirements now on 12 L NRB. She appears to look more alert but is not taking part in conversation.  Code status changed to LIMITED x 4 with daughter agreeable to transfer to ICU for vasopressor support if needed.   She is on amiodarone gtt and heparin infusion. Started on Solumedrol 40 mg q8h IV per primary team.     ROS: Patient unable to vocalize.     24HR INTAKE/OUTPUT:    Intake/Output Summary (Last 24 hours) at 2024 1108  Last data filed at 2024 0400  Gross per 24 hour   Intake 0 ml   Output 3000 ml   Net -3000 ml        potassium chloride  10 mEq IntraVENous Q1H    methylPREDNISolone  40 mg IntraVENous Q8H    furosemide  20 mg IntraVENous TID    vancomycin  750 mg IntraVENous Q12H    meropenem  1,000 mg IntraVENous Q8H    balsum peru-castor oil   Topical BID    budesonide  0.25 mg Nebulization BID RT    sodium chloride flush  5-40 mL IntraVENous 2 times per day    levalbuterol  0.63 mg Nebulization Q4H WA RT    ipratropium  0.5 mg Nebulization 4x Daily RT    pantoprazole (PROTONIX) 40 mg in sodium chloride (PF) 0.9 % 10 mL injection  40 mg IntraVENous BID           PHYSICAL EXAMINATION:  BP (!) 140/66   Pulse 83   Temp 98.7 °F (37.1 °C) (Bladder)   Resp 23   Ht 1.575 m (5' 2\")   Wt 44.1 kg (97 lb 3.6 oz)   SpO2 94%   BMI 17.78 kg/m²   CURRENT PULSE OXIMETRY:  SpO2: 94 %  24HR PULSE OXIMETRY RANGE:  SpO2  Av.3 %  Min: 88 %  Max: 98 %       Gen: In distress, sick look overall.  HEENT: PERRL, EOMI, OP nl  Lung:  Bilateral rhonchi  CV: RRR without M/R/R  Abd: +BS, soft, NT/ND  Ext: No edema.    DATA  CBC:   Recent Labs     24  0449 24  0539 24  0551   WBC  --  11.6* 11.0   HGB 13.5 12.1 11.7*   HCT 39.8 35.8* 33.9*   MCV  --  94.1 93.9   PLT  --  109* 102*     BMP:   Recent Labs     24  1834 24  0449  antibiotics  Heparin drip  Aspiration precautions  Progression of therapy with vest if able    Kurt Bourne \"Ace\" Tracy Hardy MD  Pulmonary and Critical Care Medicine

## 2024-02-01 NOTE — PROGRESS NOTES
Speech pathology  Hold    Chart reviewed, d/w RN. Pt currently on NRB.  Will follow up as pt able and schedule allows      ABE SANDOVAL M.S./CCC-SLP #[id number]  Pg. # [phone]

## 2024-02-01 NOTE — PROGRESS NOTES
Clinical Pharmacy Progress Note    Vancomycin - Management by Pharmacy    Consult Date(s): 1/28/24  Consulting Provider(s): Ara Haley MD     Assessment / Plan  Severe sepsis, PNA - Vancomycin  Concurrent Antimicrobials: Meropenem  Day of Vanc Therapy / Ordered Duration: 5 of 7  Current Dosing Method: Bayesian AUC Dosing   Therapeutic Goal: -600   Current Dose / Plan:   Admitted with NYA which has improved. SCr stable at <0.5  Currently on 750mg IV q12h.  Regimen predicts AUC of 557 mg/L*h; level last checked on 1/31.  Repeat levels will be ordered as clinically appropriate - may not need repeat level if therapy not extended past 7 days.  Will continue to monitor clinical condition and make adjustments to regimen as appropriate.    Please call with questions--  Annemarie Orozco, PharmD, [facility]  Wireless: [phone]   2/1/2024 8:50 AM        Interval update:  Had recurrence of AFib with RVR yesterday - rebolused with amidoarone. Remains on heparin gtt.    Subjective/Objective:   Zuleima Mir is a 83 y.o. female with a PMHx significant for COPD, T2DM, HTN, who presented to ED with AMS. On presentation, pt was noted to be in AFib, CXR showed extensive LLL and patchy RLL consolidation consistent with PNA. Admitted with sepsis, PNA, influenza and NYA.    Pharmacy is consulted to manage vancomycin.    Ht Readings from Last 1 Encounters:   01/28/24 1.575 m (5' 2\")     Wt Readings from Last 1 Encounters:   02/01/24 44.1 kg (97 lb 3.6 oz)     Current & Prior Antimicrobial Regimen(s):  Meropenem (1/28-current)  Vancomycin - Pharmacy to dose   (1/29-1/30)  750mg IV q12h (1/30-current)    Vancomycin Level(s) / Doses:  Date Time Dose Type of Level / Level Interpretation   1/29 05:48 500mg IV q24h Random = 13.6 mg/L Drawn ~3h after prior dose  Difficult to interpret level - likely still in distributive phase   1/30 06:25 750mg IV q18h Random = 20.5 mg/L Drawn ~30 min after prior dose hung (while dose infusing) -- inaccurate

## 2024-02-01 NOTE — PLAN OF CARE
Daughter brought in the durable HCPOA. Document was copied and physical document placed in paper chart.     Jesus Parikh MD  PGY-2

## 2024-02-01 NOTE — PLAN OF CARE
Problem: Nutrition Deficit:  Goal: Optimize nutritional status  Outcome: Not Progressing  Flowsheets (Taken 2/1/2024 1802)  Nutrient intake appropriate for improving, restoring, or maintaining nutritional needs:   Assess nutritional status and recommend course of action   Monitor oral intake, labs, and treatment plans     Problem: Pain  Goal: Verbalizes/displays adequate comfort level or baseline comfort level  Outcome: Progressing  Flowsheets (Taken 2/1/2024 1802)  Verbalizes/displays adequate comfort level or baseline comfort level:   Encourage patient to monitor pain and request assistance   Assess pain using appropriate pain scale     Problem: Safety - Adult  Goal: Free from fall injury  Outcome: Progressing  Flowsheets (Taken 2/1/2024 1802)  Free From Fall Injury: Instruct family/caregiver on patient safety  Note: Patient in lowest position, bed breaks locked, bed alarm in placed, call light within reach, and encouraged to call for assistance. Family at bedside.      Problem: Cardiovascular - Adult  Goal: Maintains optimal cardiac output and hemodynamic stability  Outcome: Progressing  Flowsheets (Taken 2/1/2024 1802)  Maintains optimal cardiac output and hemodynamic stability:   Monitor blood pressure and heart rate   Monitor urine output and notify Licensed Independent Practitioner for values outside of normal range   Assess for signs of decreased cardiac output     Problem: Cardiovascular - Adult  Goal: Absence of cardiac dysrhythmias or at baseline  Outcome: Progressing  Flowsheets (Taken 2/1/2024 1802)  Absence of cardiac dysrhythmias or at baseline:   Monitor cardiac rate and rhythm   Assess for signs of decreased cardiac output   Administer antiarrhythmia medication and electrolyte replacement as ordered  Note: Patient on amiodarone gtt per order. Patient NSR.      Problem: Respiratory - Adult  Goal: Achieves optimal ventilation and oxygenation  Outcome: Progressing  Flowsheets (Taken 2/1/2024  1802)  Achieves optimal ventilation and oxygenation:   Assess for changes in respiratory status   Assess for changes in mentation and behavior   Position to facilitate oxygenation and minimize respiratory effort   Oxygen supplementation based on oxygen saturation or arterial blood gases   Initiate smoking cessation protocol as indicated   Encourage broncho-pulmonary hygiene including cough, deep breathe, incentive spirometry   Assess and instruct to report shortness of breath or any respiratory difficulty   Respiratory therapy support as indicated  Note: Patient on 15L non rebreather this morning. Patient wean to 11 L non-rebreather currently.      Problem: Genitourinary - Adult  Goal: Absence of urinary retention  Outcome: Progressing  Flowsheets (Taken 2/1/2024 1802)  Absence of urinary retention:   Assess patient’s ability to void and empty bladder   Monitor intake/output and perform bladder scan as needed     Problem: Genitourinary - Adult  Goal: Urinary catheter remains patent  Outcome: Progressing  Flowsheets (Taken 2/1/2024 1802)  Urinary catheter remains patent:   Assess patency of urinary catheter   Irrigate catheter per Licensed Independent Practitioner order if indicated and notify Licensed Independent Practitioner if unable to irrigate     Problem: Skin/Tissue Integrity - Adult  Goal: Skin integrity remains intact  Outcome: Progressing  Flowsheets  Taken 2/1/2024 1802  Skin Integrity Remains Intact: Monitor for areas of redness and/or skin breakdown  Taken 2/1/2024 1652  Skin Integrity Remains Intact: Monitor for areas of redness and/or skin breakdown     Problem: Hematologic - Adult  Goal: Maintains hematologic stability  Outcome: Progressing  Flowsheets (Taken 2/1/2024 1802)  Maintains hematologic stability:   Assess for signs and symptoms of bleeding or hemorrhage   Monitor labs for bleeding or clotting disorders  Note: Patient on a heparin gtt per order.      Problem: Chronic Conditions and

## 2024-02-01 NOTE — PROGRESS NOTES
Progress Note    Admit Date: 1/28/2024  Day: 4  Diet: Diet NPO Exceptions are: Sips of Water with Meds    CC: AMS, fatigue    Interval history:   Pt reverted to afib w/ RVR yesterday evening with associated increased O2 requirements. Worsening prognosis discussed with family (POA) who switched code status to Limited x4. Received amio bolus yesterday with eventual return to NSR. Remains in NSR today but with continued elevated O2 requirements at 15 L this am. Has not yet required vapotherm. On examination, pt with eyes open and staring off into space but interacts less.     HPI:   Zuleima Mir is a 83 y.o. female, presented with AMS. Patient has a PMHx of   IBS, HTN, HLD, allergic rhinitis, DM2.  Patient is too altered to converse however her daughter was in the room to provide information.  She mentions that over the past few weeks her mother started becoming more fatigued, weak, altered, had been eating less, and developed a productive cough.  She is unsure if it is a thick or thin sputum or its color.  She said that she had become so weak that her legs gave out and she was not able to walk.  She wanted to bring her to the hospital sooner however her mother denied.     At the ED, , /52.  She was found to have atrial fibrillation on monitor and her daughter denies her ever having atrial fibrillation nor it is not seen on her chart.  She was started on a Dilt gtt which improved her heart rate to ~140-160s and was given a bolus of 1.3L of LR which improved her BP as well.  Her labs were pertinent for creatinine of 2.0 which is elevated from her baseline of around 0.8, LA 3.4-> 12.7, troponin 31.  She is influenza A positive.  CXR showed extensive left lower lung and patchy right lower lung consolidation most compatible with multifocal pneumonia.     She will be admitted to [facility] to further manage her septic pneumonia and NYA.    Medications:     Scheduled Meds:   potassium chloride  10 mEq IntraVENous Q1H

## 2024-02-01 NOTE — PLAN OF CARE
Problem: Respiratory - Adult  Goal: Achieves optimal ventilation and oxygenation  Outcome: Not Progressing  Flowsheets (Taken 1/29/2024 1046 by Jody Silva, RN)  Achieves optimal ventilation and oxygenation:   Assess for changes in respiratory status   Assess for changes in mentation and behavior   Position to facilitate oxygenation and minimize respiratory effort  Note: O2 demand increased on previous shift. Continue to monitor for worsening resp status     Problem: Gastrointestinal - Adult  Goal: Maintains adequate nutritional intake  Outcome: Not Progressing  Flowsheets (Taken 1/31/2024 2211)  Maintains adequate nutritional intake: Monitor percentage of each meal consumed  Note: Pt with poor appetite minimal intake.

## 2024-02-01 NOTE — PROGRESS NOTES
Consulted for sacrum, left posterior thigh, right anterior thigh and left buttock. Wound care orders placed. Wound Care to sign off; re-consult for changes or deterioration.

## 2024-02-02 ENCOUNTER — APPOINTMENT (OUTPATIENT)
Dept: GENERAL RADIOLOGY | Age: 84
DRG: 871 | End: 2024-02-02
Payer: MEDICARE

## 2024-02-02 LAB
ALBUMIN SERPL-MCNC: 2.1 G/DL (ref 3.4–5)
ALBUMIN SERPL-MCNC: 2.2 G/DL (ref 3.4–5)
ALBUMIN/GLOB SERPL: 0.6 {RATIO} (ref 1.1–2.2)
ALP SERPL-CCNC: 53 U/L (ref 40–129)
ALT SERPL-CCNC: 31 U/L (ref 10–40)
ANION GAP SERPL CALCULATED.3IONS-SCNC: 11 MMOL/L (ref 3–16)
ANION GAP SERPL CALCULATED.3IONS-SCNC: 7 MMOL/L (ref 3–16)
ANTI-XA UNFRAC HEPARIN: 0.99 IU/ML (ref 0.3–0.7)
ANTI-XA UNFRAC HEPARIN: 1.02 IU/ML (ref 0.3–0.7)
APTT BLD: >180 SEC (ref 22.7–35.9)
AST SERPL-CCNC: 32 U/L (ref 15–37)
BASOPHILS # BLD: 0.1 K/UL (ref 0–0.2)
BASOPHILS NFR BLD: 0.8 %
BILIRUB SERPL-MCNC: 0.8 MG/DL (ref 0–1)
BUN SERPL-MCNC: 24 MG/DL (ref 7–20)
BUN SERPL-MCNC: 30 MG/DL (ref 7–20)
CALCIUM SERPL-MCNC: 8.3 MG/DL (ref 8.3–10.6)
CALCIUM SERPL-MCNC: 8.4 MG/DL (ref 8.3–10.6)
CHLORIDE SERPL-SCNC: 100 MMOL/L (ref 99–110)
CHLORIDE SERPL-SCNC: 103 MMOL/L (ref 99–110)
CO2 SERPL-SCNC: 32 MMOL/L (ref 21–32)
CO2 SERPL-SCNC: 32 MMOL/L (ref 21–32)
CREAT SERPL-MCNC: 0.5 MG/DL (ref 0.6–1.2)
CREAT SERPL-MCNC: <0.5 MG/DL (ref 0.6–1.2)
DEPRECATED RDW RBC AUTO: 13.7 % (ref 12.4–15.4)
EOSINOPHIL # BLD: 0 K/UL (ref 0–0.6)
EOSINOPHIL NFR BLD: 0 %
GFR SERPLBLD CREATININE-BSD FMLA CKD-EPI: >60 ML/MIN/{1.73_M2}
GFR SERPLBLD CREATININE-BSD FMLA CKD-EPI: >60 ML/MIN/{1.73_M2}
GLUCOSE SERPL-MCNC: 134 MG/DL (ref 70–99)
GLUCOSE SERPL-MCNC: 149 MG/DL (ref 70–99)
HCT VFR BLD AUTO: 35 % (ref 36–48)
HGB BLD-MCNC: 12 G/DL (ref 12–16)
LYMPHOCYTES # BLD: 0.4 K/UL (ref 1–5.1)
LYMPHOCYTES NFR BLD: 3.3 %
MAGNESIUM SERPL-MCNC: 1.9 MG/DL (ref 1.8–2.4)
MAGNESIUM SERPL-MCNC: 2.1 MG/DL (ref 1.8–2.4)
MCH RBC QN AUTO: 31.7 PG (ref 26–34)
MCHC RBC AUTO-ENTMCNC: 34.1 G/DL (ref 31–36)
MCV RBC AUTO: 92.9 FL (ref 80–100)
MONOCYTES # BLD: 0.3 K/UL (ref 0–1.3)
MONOCYTES NFR BLD: 2.9 %
NEUTROPHILS # BLD: 10.2 K/UL (ref 1.7–7.7)
NEUTROPHILS NFR BLD: 93 %
PHOSPHATE SERPL-MCNC: 2.5 MG/DL (ref 2.5–4.9)
PLATELET # BLD AUTO: 124 K/UL (ref 135–450)
PMV BLD AUTO: 9.1 FL (ref 5–10.5)
POTASSIUM SERPL-SCNC: 2.9 MMOL/L (ref 3.5–5.1)
POTASSIUM SERPL-SCNC: 3.7 MMOL/L (ref 3.5–5.1)
PROT SERPL-MCNC: 6 G/DL (ref 6.4–8.2)
RBC # BLD AUTO: 3.77 M/UL (ref 4–5.2)
SODIUM SERPL-SCNC: 142 MMOL/L (ref 136–145)
SODIUM SERPL-SCNC: 143 MMOL/L (ref 136–145)
WBC # BLD AUTO: 10.9 K/UL (ref 4–11)

## 2024-02-02 PROCEDURE — 2580000003 HC RX 258

## 2024-02-02 PROCEDURE — 6360000002 HC RX W HCPCS

## 2024-02-02 PROCEDURE — 94761 N-INVAS EAR/PLS OXIMETRY MLT: CPT

## 2024-02-02 PROCEDURE — 6360000002 HC RX W HCPCS: Performed by: INTERNAL MEDICINE

## 2024-02-02 PROCEDURE — 2060000000 HC ICU INTERMEDIATE R&B

## 2024-02-02 PROCEDURE — 2500000003 HC RX 250 WO HCPCS

## 2024-02-02 PROCEDURE — 6370000000 HC RX 637 (ALT 250 FOR IP)

## 2024-02-02 PROCEDURE — 2500000003 HC RX 250 WO HCPCS: Performed by: INTERNAL MEDICINE

## 2024-02-02 PROCEDURE — 36415 COLL VENOUS BLD VENIPUNCTURE: CPT

## 2024-02-02 PROCEDURE — A4216 STERILE WATER/SALINE, 10 ML: HCPCS

## 2024-02-02 PROCEDURE — 80053 COMPREHEN METABOLIC PANEL: CPT

## 2024-02-02 PROCEDURE — 85025 COMPLETE CBC W/AUTO DIFF WBC: CPT

## 2024-02-02 PROCEDURE — 99291 CRITICAL CARE FIRST HOUR: CPT | Performed by: INTERNAL MEDICINE

## 2024-02-02 PROCEDURE — 71045 X-RAY EXAM CHEST 1 VIEW: CPT

## 2024-02-02 PROCEDURE — C9113 INJ PANTOPRAZOLE SODIUM, VIA: HCPCS

## 2024-02-02 PROCEDURE — 94640 AIRWAY INHALATION TREATMENT: CPT

## 2024-02-02 PROCEDURE — 85730 THROMBOPLASTIN TIME PARTIAL: CPT

## 2024-02-02 PROCEDURE — 2700000000 HC OXYGEN THERAPY PER DAY

## 2024-02-02 PROCEDURE — 94669 MECHANICAL CHEST WALL OSCILL: CPT

## 2024-02-02 PROCEDURE — 92526 ORAL FUNCTION THERAPY: CPT

## 2024-02-02 PROCEDURE — 83735 ASSAY OF MAGNESIUM: CPT

## 2024-02-02 PROCEDURE — 85520 HEPARIN ASSAY: CPT

## 2024-02-02 PROCEDURE — 99231 SBSQ HOSP IP/OBS SF/LOW 25: CPT | Performed by: INTERNAL MEDICINE

## 2024-02-02 RX ORDER — METOPROLOL TARTRATE 1 MG/ML
2.5 INJECTION, SOLUTION INTRAVENOUS EVERY 6 HOURS
Status: DISCONTINUED | OUTPATIENT
Start: 2024-02-02 | End: 2024-02-02

## 2024-02-02 RX ORDER — GAUZE BANDAGE 2" X 2"
100 BANDAGE TOPICAL DAILY
Status: DISCONTINUED | OUTPATIENT
Start: 2024-02-02 | End: 2024-02-03

## 2024-02-02 RX ORDER — POTASSIUM CHLORIDE 7.45 MG/ML
10 INJECTION INTRAVENOUS
Status: DISCONTINUED | OUTPATIENT
Start: 2024-02-02 | End: 2024-02-02

## 2024-02-02 RX ORDER — FOLIC ACID 1 MG/1
5 TABLET ORAL DAILY
Status: DISCONTINUED | OUTPATIENT
Start: 2024-02-02 | End: 2024-02-08 | Stop reason: HOSPADM

## 2024-02-02 RX ORDER — ENOXAPARIN SODIUM 100 MG/ML
1 INJECTION SUBCUTANEOUS 2 TIMES DAILY
Status: DISCONTINUED | OUTPATIENT
Start: 2024-02-02 | End: 2024-02-02

## 2024-02-02 RX ORDER — POTASSIUM CHLORIDE 7.45 MG/ML
10 INJECTION INTRAVENOUS
Status: DISPENSED | OUTPATIENT
Start: 2024-02-02 | End: 2024-02-02

## 2024-02-02 RX ORDER — MULTIVITAMIN WITH IRON
1 TABLET ORAL DAILY
Status: DISCONTINUED | OUTPATIENT
Start: 2024-02-02 | End: 2024-02-08 | Stop reason: HOSPADM

## 2024-02-02 RX ORDER — POTASSIUM CHLORIDE 20 MEQ/1
20 TABLET, EXTENDED RELEASE ORAL ONCE
Status: DISCONTINUED | OUTPATIENT
Start: 2024-02-02 | End: 2024-02-02

## 2024-02-02 RX ORDER — FUROSEMIDE 10 MG/ML
20 INJECTION INTRAMUSCULAR; INTRAVENOUS 3 TIMES DAILY
Status: DISCONTINUED | OUTPATIENT
Start: 2024-02-02 | End: 2024-02-04

## 2024-02-02 RX ORDER — METOPROLOL TARTRATE 1 MG/ML
5 INJECTION, SOLUTION INTRAVENOUS EVERY 6 HOURS
Status: DISCONTINUED | OUTPATIENT
Start: 2024-02-02 | End: 2024-02-08 | Stop reason: HOSPADM

## 2024-02-02 RX ADMIN — IPRATROPIUM BROMIDE 0.5 MG: 0.5 SOLUTION RESPIRATORY (INHALATION) at 22:37

## 2024-02-02 RX ADMIN — METHYLPREDNISOLONE SODIUM SUCCINATE 40 MG: 40 INJECTION, POWDER, LYOPHILIZED, FOR SOLUTION INTRAMUSCULAR; INTRAVENOUS at 02:51

## 2024-02-02 RX ADMIN — LEVALBUTEROL HYDROCHLORIDE 0.63 MG: 0.63 SOLUTION RESPIRATORY (INHALATION) at 16:18

## 2024-02-02 RX ADMIN — MEROPENEM 1000 MG: 1 INJECTION INTRAVENOUS at 21:47

## 2024-02-02 RX ADMIN — LEVALBUTEROL HYDROCHLORIDE 0.63 MG: 0.63 SOLUTION RESPIRATORY (INHALATION) at 11:19

## 2024-02-02 RX ADMIN — IPRATROPIUM BROMIDE 0.5 MG: 0.5 SOLUTION RESPIRATORY (INHALATION) at 11:19

## 2024-02-02 RX ADMIN — POTASSIUM CHLORIDE 10 MEQ: 10 INJECTION, SOLUTION INTRAVENOUS at 08:25

## 2024-02-02 RX ADMIN — VANCOMYCIN HYDROCHLORIDE 750 MG: 10 INJECTION, POWDER, LYOPHILIZED, FOR SOLUTION INTRAVENOUS at 05:48

## 2024-02-02 RX ADMIN — METHYLPREDNISOLONE SODIUM SUCCINATE 40 MG: 40 INJECTION, POWDER, LYOPHILIZED, FOR SOLUTION INTRAMUSCULAR; INTRAVENOUS at 17:03

## 2024-02-02 RX ADMIN — POTASSIUM CHLORIDE 10 MEQ: 10 INJECTION, SOLUTION INTRAVENOUS at 04:59

## 2024-02-02 RX ADMIN — MEROPENEM 1000 MG: 1 INJECTION INTRAVENOUS at 04:58

## 2024-02-02 RX ADMIN — METOPROLOL TARTRATE 2.5 MG: 1 INJECTION, SOLUTION INTRAVENOUS at 09:08

## 2024-02-02 RX ADMIN — FUROSEMIDE 20 MG: 10 INJECTION, SOLUTION INTRAMUSCULAR; INTRAVENOUS at 17:03

## 2024-02-02 RX ADMIN — BUDESONIDE 250 MCG: 0.25 INHALANT RESPIRATORY (INHALATION) at 22:37

## 2024-02-02 RX ADMIN — POTASSIUM CHLORIDE 10 MEQ: 10 INJECTION, SOLUTION INTRAVENOUS at 06:04

## 2024-02-02 RX ADMIN — SODIUM CHLORIDE, PRESERVATIVE FREE 10 ML: 5 INJECTION INTRAVENOUS at 21:36

## 2024-02-02 RX ADMIN — METOPROLOL TARTRATE 5 MG: 1 INJECTION, SOLUTION INTRAVENOUS at 21:34

## 2024-02-02 RX ADMIN — POTASSIUM CHLORIDE 10 MEQ: 10 INJECTION, SOLUTION INTRAVENOUS at 10:32

## 2024-02-02 RX ADMIN — METHYLPREDNISOLONE SODIUM SUCCINATE 40 MG: 40 INJECTION, POWDER, LYOPHILIZED, FOR SOLUTION INTRAMUSCULAR; INTRAVENOUS at 09:09

## 2024-02-02 RX ADMIN — SODIUM CHLORIDE, PRESERVATIVE FREE 40 MG: 5 INJECTION INTRAVENOUS at 08:46

## 2024-02-02 RX ADMIN — POTASSIUM CHLORIDE 10 MEQ: 10 INJECTION, SOLUTION INTRAVENOUS at 13:37

## 2024-02-02 RX ADMIN — POTASSIUM PHOSPHATE 15 MMOL: 236; 224 INJECTION, SOLUTION INTRAVENOUS at 18:36

## 2024-02-02 RX ADMIN — Medication: at 08:47

## 2024-02-02 RX ADMIN — FUROSEMIDE 20 MG: 10 INJECTION, SOLUTION INTRAMUSCULAR; INTRAVENOUS at 08:46

## 2024-02-02 RX ADMIN — LEVALBUTEROL HYDROCHLORIDE 0.63 MG: 0.63 SOLUTION RESPIRATORY (INHALATION) at 08:31

## 2024-02-02 RX ADMIN — Medication: at 01:03

## 2024-02-02 RX ADMIN — LEVALBUTEROL HYDROCHLORIDE 0.63 MG: 0.63 SOLUTION RESPIRATORY (INHALATION) at 22:37

## 2024-02-02 RX ADMIN — Medication: at 21:47

## 2024-02-02 RX ADMIN — SODIUM CHLORIDE, PRESERVATIVE FREE 40 MG: 5 INJECTION INTRAVENOUS at 21:33

## 2024-02-02 RX ADMIN — METOPROLOL TARTRATE 5 MG: 1 INJECTION, SOLUTION INTRAVENOUS at 13:37

## 2024-02-02 RX ADMIN — IPRATROPIUM BROMIDE 0.5 MG: 0.5 SOLUTION RESPIRATORY (INHALATION) at 08:29

## 2024-02-02 RX ADMIN — SODIUM CHLORIDE 25 ML: 9 INJECTION, SOLUTION INTRAVENOUS at 21:47

## 2024-02-02 RX ADMIN — MEROPENEM 1000 MG: 1 INJECTION INTRAVENOUS at 10:33

## 2024-02-02 RX ADMIN — BUDESONIDE 250 MCG: 0.25 INHALANT RESPIRATORY (INHALATION) at 08:28

## 2024-02-02 RX ADMIN — Medication 15 UNITS: at 14:40

## 2024-02-02 RX ADMIN — VANCOMYCIN HYDROCHLORIDE 750 MG: 10 INJECTION, POWDER, LYOPHILIZED, FOR SOLUTION INTRAVENOUS at 17:08

## 2024-02-02 RX ADMIN — IPRATROPIUM BROMIDE 0.5 MG: 0.5 SOLUTION RESPIRATORY (INHALATION) at 16:18

## 2024-02-02 RX ADMIN — FUROSEMIDE 20 MG: 10 INJECTION, SOLUTION INTRAMUSCULAR; INTRAVENOUS at 21:34

## 2024-02-02 NOTE — PROGRESS NOTES
weekend, will be seen on Monday. Please call us back if re-evaluation is required.        Kurt Bourne \"Ace\" Tracy Hardy MD  Pulmonary and Critical Care Medicine

## 2024-02-02 NOTE — PROGRESS NOTES
Cardiology Consult Service  Daily Progress Note        Admit Date:  1/28/2024  Primary cardiologist: Dr Rosales, new     Reason for Consultation/Chief Complaint: PAFRVR    Subjective:      Zuleima Mir is a 83 y.o. female with a past medical history of smoker, COPD not on oxygen, IBS, HTN, HLP, DM, on chronic benzodiazepines and opioids.     Patient presented on 1/28 with decreased p.o. intake, altered mental status and fatigue with cough over the last few weeks.  She was hypoxic, febrile (Tmax 100.4), severely tachycardic 180s with low normal BP, requiring 15 L of supplemental oxygen for adequate saturation.  Creatinine 2.0, chest x-ray consistent with multifocal pneumonia.  ECG consistent with  bpm, nonspecific changes.  She was admitted for sepsis due to community-acquired pneumonia, NYA, PAF RVR, influenza infection.  She was initially placed on diltiazem drip and was given digoxin 500 mcg IV x 1 along with IV antibiotics.  She received LR 2 L as a bolus and was placed on  mill per hour.  Cardiology was consulted.  Patient subsequently became hypotensive and diltiazem drip was changed to amiodarone drip after discussing the case with me.     Patient currently appears hemodynamically stable.  Telemetry personally reviewed, reveals normal sinus rhythm rates in the 80s.  She is now requiring only 5 L of supplemental oxygen.  Heparin drip was held by primary team due to positive Hemoccult and GI was consulted.  Hemoglobin down to 13.9 from 16.8 (I suspect hemodilution after receiving IV fluids).  Patient appears confused, does not report any complaints.    CTA chest 1/20/2024 negative for PE, mucous plugging and bronchial thickening and bronchiectasis mostly in the lower lobes noted.    Echo 1/30/2024: Normal LV, LVEF 60%, mild aortic stenosis, mild to moderate MR, normal filling pressures.    Interval history:  Patient remains on 15 L of supplemental oxygen.  As he is NPO.  Telemetry consistent with sinus  questions and concerns were addressed to the patient/family. Alternatives to my treatment were discussed.     Thank you for allowing to us to participate in the svitlana or Zuleima Mir. Please call our service with questions.    Karan Rosales MD, [facility], [facility] [place] [address]  Ph: [phone]  Fax: [phone]

## 2024-02-02 NOTE — PROGRESS NOTES
PIV in LUE infiltrated with potassium. Notified pharmacy that recommended hyaluronidase injection. Notified MD who ordered injections. Marked area of swelling and administered hyaluronidase injection.

## 2024-02-02 NOTE — PROGRESS NOTES
Notified provider via [employer] that patient refused her eliquis. Provider stated we will re-evaluate tomorrow as anticoagulation is not immediately necessary.

## 2024-02-02 NOTE — CARE COORDINATION
DISCHARGE PLANNING:  Chart reviewed.  Patient is from home with daughter.     Per Dr. Awad, hopeful for discharge on Monday.  Lan with public benefits trying to reach daughter to start Medicaid process so patient can go LTC.  Due to difficulty of Lan getting a hold of daughter, I brought up potentially getting patient into a facility as skilled. Delmi stated he felt patient could work with PT/OT, orders placed.    CM team will continue to follow.  Leighann Altamirano, RN Case Manager  [phone]

## 2024-02-02 NOTE — RT PROTOCOL NOTE
RT Inhaler-Nebulizer Bronchodilator Protocol Note    There is a bronchodilator order in the chart from a provider indicating to follow the RT Bronchodilator Protocol and there is an “Initiate RT Inhaler-Nebulizer Bronchodilator Protocol” order as well (see protocol at bottom of note).    CXR Findings:  XR CHEST PORTABLE    Result Date: 2/1/2024  1. Multifocal bilateral airspace disease most consistent with pneumonia.      The findings from the last RT Protocol Assessment were as follows:   History Pulmonary Disease: Chronic pulmonary disease  Respiratory Pattern: Regular pattern and RR 12-20 bpm  Breath Sounds: Slightly diminished and/or crackles  Cough: Strong, spontaneous, non-productive  Indication for Bronchodilator Therapy: On home bronchodilators  Bronchodilator Assessment Score: 4  Will continue with Q4.    Aerosolized bronchodilator medication orders have been revised according to the RT Inhaler-Nebulizer Bronchodilator Protocol below.    Respiratory Therapist to perform RT Therapy Protocol Assessment initially then follow the protocol.  Repeat RT Therapy Protocol Assessment PRN for score 0-3 or on second treatment, BID, and PRN for scores above 3.    No Indications - adjust the frequency to every 6 hours PRN wheezing or bronchospasm, if no treatments needed after 48 hours then discontinue using Per Protocol order mode.     If indication present, adjust the RT bronchodilator orders based on the Bronchodilator Assessment Score as indicated below.  Use Inhaler orders unless patient has one or more of the following: on home nebulizer, not able to hold breath for 10 seconds, is not alert and oriented, cannot activate and use MDI correctly, or respiratory rate 25 breaths per minute or more, then use the equivalent nebulizer order(s) with same Frequency and PRN reasons based on the score.  If a patient is on this medication at home then do not decrease Frequency below that used at home.    0-3 - enter or revise RT  bronchodilator order(s) to equivalent RT Bronchodilator order with Frequency of every 4 hours PRN for wheezing or increased work of breathing using Per Protocol order mode.        4-6 - enter or revise RT Bronchodilator order(s) to two equivalent RT bronchodilator orders with one order with BID Frequency and one order with Frequency of every 4 hours PRN wheezing or increased work of breathing using Per Protocol order mode.        7-10 - enter or revise RT Bronchodilator order(s) to two equivalent RT bronchodilator orders with one order with TID Frequency and one order with Frequency of every 4 hours PRN wheezing or increased work of breathing using Per Protocol order mode.       11-13 - enter or revise RT Bronchodilator order(s) to one equivalent RT bronchodilator order with QID Frequency and an Albuterol order with Frequency of every 4 hours PRN wheezing or increased work of breathing using Per Protocol order mode.      Greater than 13 - enter or revise RT Bronchodilator order(s) to one equivalent RT bronchodilator order with every 4 hours Frequency and an Albuterol order with Frequency of every 2 hours PRN wheezing or increased work of breathing using Per Protocol order mode.     RT to enter RT Home Evaluation for COPD & MDI Assessment order using Per Protocol order mode.    Electronically signed by Taylor Cerrato RCP on 2/2/2024 at 11:23 AM

## 2024-02-02 NOTE — PROGRESS NOTES
significant drop. Has remained stable  - GI consulted; appreciate recs   - No intervention at this time, can consider EGD if Hgb drops significantly but has remained stable.    ## Dilated bile ducts/prominent pancreatic duct  Incidentally found on CT. Liver enzymes significant for elevated AST but otherwise normal. No abdominal tenderness appreciated  - GI consulted; appreciate recs  - Liver enzymes normalized    #NYA, resolved  Likely due to renal hypoperfusion with component of cardiogenic shock vs decreased PO intake  - Resolved with fluid resuscitation and NSR      DVT PPx: Heparin ggt  Diet: Diet NPO exceptions are sips of water with meds  Code status:  Limited x4  ELOS: 3 days  Barriers to discharge: severe sepsis,  Disposition  - Preadmission: Home  - Current: IP  - Upon discharge: Home    Richard Tamayo MD, PGY-1  02/02/24  9:49 AM    This patient has been staffed and discussed with Candelario Awad MD.    Patient seen and examined, labs and imaging studies reviewed, agree with assessment and plan as outlined above.  Continue with current care and plan.  Discussed case with patients nurse, discussed case with care team, discussed plan.  Pt looks profoundly better today.      Candelario Awad MD FACP

## 2024-02-02 NOTE — PROGRESS NOTES
Comprehensive Nutrition Assessment    RECOMMENDATIONS:  PO Diet: Currently NPO with sips of water  Recommend EN formula Standard Formula with Fiber  Jevity 1.5 @ goal rate 40 ml/hr   Initiate EN @10 mL/hr and as tolerated, increase by 10 mL/hr q 8 hours until goal of 40 mL/hr is met.   Recommend water bolus 100 ml Q4 hrs  Recommend protein modular daily via feeding tube. Flush with 30 ml water before/after administration. Do not mix with tube feeding formula.    NUTRITION ASSESSMENT:   Nutritional summary & status: Patient on full liquid diet with minimal PO intake. Medical staff trying to obtain access for enteral nutrition, RD providing recommendations for alternative nutrition and will continue to follow.   Admission // PMH: Pneumonia/Sepsis // IBS, HTN, HLD, DM2, Afib    MALNUTRITION ASSESSMENT  Context of Malnutrition: Acute Illness   Malnutrition Status: Severe malnutrition  Findings of the 6 clinical characteristics of malnutrition (Minimum of 2 out of 6 clinical characteristics is required to make the diagnosis of moderate or severe Protein Calorie Malnutrition based on AND/ASPEN Guidelines):  Energy Intake:  Mild decrease in energy intake (Comment)  Weight Loss:  No significant weight loss     Body Fat Loss:  Moderate body fat loss Orbital, Triceps, Fat Overlying Ribs   Muscle Mass Loss:  Moderate muscle mass loss Temples (temporalis)  Fluid Accumulation:  No significant fluid accumulation     Strength:  Not Performed    NUTRITION DIAGNOSIS   Inadequate oral intake related to cognitive or neurological impairment as evidenced by NPO or clear liquid status due to medical condition    Nutrition Monitoring and Evaluation:   Food/Nutrient Intake Outcomes:  Food and Nutrient Intake, Supplement Intake, Enteral Nutrition Intake/Tolerance  Physical Signs/Symptoms Outcomes:  Biochemical Data, Skin     OBJECTIVE DATA: Significant to nutrition assessment  Nutrition Related Findings: Labs reviewed, Na 142  Wounds:  Multiple  Nutrition Goals: Meet at least 75% of estimated needs, Initiate nutrition support, by next RD assessment     CURRENT NUTRITION THERAPIES  ADULT DIET; Full Liquid  ADULT ORAL NUTRITION SUPPLEMENT; Breakfast, Lunch, Dinner; Standard High Calorie/High Protein Oral Supplement  PO Intake: Unable to assess   PO Supplement Intake:Unable to assess  Additional Sources of Calories/IVF:  KCl in D5 @100 ml/hr    COMPARATIVE STANDARDS  Energy (kcal):  8052-1995 (30-35)     Protein (g):  78-87 (1.8-2)       Fluid (ml/day):  1500 ml    ANTHROPOMETRICS  Current Height: 157.5 cm (5' 2\")  Current Weight - Scale: 43.6 kg (96 lb 1.9 oz)    Admission weight: 44 kg (97 lb)    The patient will be monitored per nutrition standards of care. Consult dietitian if additional nutrition interventions are needed prior to RD reassessment.     Mireya Romeo RD  Tato:  [phone]  Office:  [phone]

## 2024-02-02 NOTE — PROGRESS NOTES
Speech Language Pathology  Facility/Department:[facility] PCU  Therapy Note    Name: Zuleima Mir  : 1940  MRN: [id number]                                                         Patient Diagnosis(es):   Patient Active Problem List    Diagnosis Date Noted    Encounter for palliative care 2024    Atrial fibrillation with RVR (HCC) 2024    Acute pulmonary embolism without acute cor pulmonale (HCC) 2024    Septicemia (HCC) 2024    Severe sepsis (HCC) 2024       Past Medical History:   Diagnosis Date    Allergic rhinitis     Emphysema (subcutaneous) (surgical) resulting from a procedure     Hyperlipidemia     Hypertension     IBS (irritable bowel syndrome)     Pancreatic cyst     Type II or unspecified type diabetes mellitus without mention of complication, not stated as uncontrolled     diet controlled    Wears hearing aid in both ears     Wears partial dentures     lower     Past Surgical History:   Procedure Laterality Date    BREAST SURGERY      biopsy    CHOLECYSTECTOMY      COLONOSCOPY      HYSTERECTOMY (CERVIX STATUS UNKNOWN)      UPPER GASTROINTESTINAL ENDOSCOPY N/A 2020    ESOPHAGOGASTRODUODENOSCOPY WITH ENDOSCOPIC ULTRASOUND AND MONITORED ANESTHESIA CARE performed by Fawad Cabrera MD at [facility] ENDOSCOPY    UPPER GASTROINTESTINAL ENDOSCOPY  2020    EGD BIOPSY performed by Fawad Cabrera MD at [facility] ENDOSCOPY     History of Present Illness  Per MD notes:  \"My assessment reveals an 83-year-old female lifelong smoker with significant past history of COPD not on oxygen, diabetes type 2, hypertension hyperlipidemia, on chronic benzos and opioids who was brought to the emergency room unresponsive, hypoxic and tachycardic in the 170s.  In emergency room she had a low-grade temperature of 37.8, initial blood pressure was 102/52, heart rate was 180 irregular, respiratory rate 20 was 95% on 15 L nonrebreather.  Preliminary workup was significant for a

## 2024-02-02 NOTE — PROGRESS NOTES
Clinical Pharmacy Progress Note    Vancomycin - Management by Pharmacy    Consult Date(s): 1/28/24  Consulting Provider(s): Ara Haley MD     Assessment / Plan  Severe sepsis, PNA - Vancomycin  Concurrent Antimicrobials: Meropenem  Day of Vanc Therapy / Ordered Duration: 6 of 7  Current Dosing Method: Bayesian AUC Dosing   Therapeutic Goal: -600   Current Dose / Plan:   Admitted with NYA which has improved. SCr stable at 0.5.   Currently on 750mg IV q12h.  Regimen predicts AUC of 565 mg/L*h; level last checked on 1/31.  Repeat levels will be ordered as clinically appropriate - may not need repeat level if therapy not extended past 7 days.  Will continue to monitor clinical condition and make adjustments to regimen as appropriate.    Please call with questions--  Santo Castillo, PharmD, [facility]  [phone]  02/02/24 11:09 AM       Interval update:  on 15L with a NRB. Remains on lasix TID and heparin gtt per cardiology.     Subjective/Objective:   Zuleima Mir is a 83 y.o. female with a PMHx significant for COPD, T2DM, HTN, who presented to ED with AMS. On presentation, pt was noted to be in AFib, CXR showed extensive LLL and patchy RLL consolidation consistent with PNA. Admitted with sepsis, PNA, influenza and NYA.    Pharmacy is consulted to manage vancomycin.    Ht Readings from Last 1 Encounters:   01/28/24 1.575 m (5' 2\")     Wt Readings from Last 1 Encounters:   02/02/24 43.6 kg (96 lb 1.9 oz)     Current & Prior Antimicrobial Regimen(s):  Meropenem (1/28-current)  Vancomycin - Pharmacy to dose   (1/29-1/30)  750mg IV q12h (1/30-current)    Vancomycin Level(s) / Doses:  Date Time Dose Type of Level / Level Interpretation   1/29 05:48 500mg IV q24h Random = 13.6 mg/L Drawn ~3h after prior dose  Difficult to interpret level - likely still in distributive phase   1/30 06:25 750mg IV q18h Random = 20.5 mg/L Drawn ~30 min after prior dose hung (while dose infusing) -- inaccurate level    1/31 04:49 750mg IV q12h

## 2024-02-02 NOTE — PROGRESS NOTES
Patient has been on heparin gtt for a few days. Patient hasn't been therapeutic since 1/29/24. Patient currently on 29 units. Anti-x resulted at 1616 at <0.10. Per order heparin gtt suppose to be increased by 4 units to 33 units along with a bolus. IV working- flushed and blood return. This nurse concerned on amount of heparin patient is getting with no therapeutics. Pharmacy called and concerned also. Dr. Roni MD and Dr. Arden MD notified and aware. Per Dr. Roni MD told this nurse to keep heparin gtt at 29 units. APTT ordered. Information relayed to night shift nurse and night shift team.

## 2024-02-03 ENCOUNTER — APPOINTMENT (OUTPATIENT)
Dept: GENERAL RADIOLOGY | Age: 84
DRG: 871 | End: 2024-02-03
Payer: MEDICARE

## 2024-02-03 LAB
ALBUMIN SERPL-MCNC: 2.1 G/DL (ref 3.4–5)
ALBUMIN SERPL-MCNC: 2.1 G/DL (ref 3.4–5)
ALBUMIN SERPL-MCNC: 2.3 G/DL (ref 3.4–5)
ANION GAP SERPL CALCULATED.3IONS-SCNC: 6 MMOL/L (ref 3–16)
ANION GAP SERPL CALCULATED.3IONS-SCNC: 7 MMOL/L (ref 3–16)
ANION GAP SERPL CALCULATED.3IONS-SCNC: 8 MMOL/L (ref 3–16)
BASOPHILS # BLD: 0 K/UL (ref 0–0.2)
BASOPHILS NFR BLD: 0.1 %
BUN SERPL-MCNC: 33 MG/DL (ref 7–20)
BUN SERPL-MCNC: 34 MG/DL (ref 7–20)
BUN SERPL-MCNC: 38 MG/DL (ref 7–20)
CALCIUM SERPL-MCNC: 7.9 MG/DL (ref 8.3–10.6)
CALCIUM SERPL-MCNC: 8.3 MG/DL (ref 8.3–10.6)
CALCIUM SERPL-MCNC: 8.3 MG/DL (ref 8.3–10.6)
CHLORIDE SERPL-SCNC: 104 MMOL/L (ref 99–110)
CHLORIDE SERPL-SCNC: 104 MMOL/L (ref 99–110)
CHLORIDE SERPL-SCNC: 105 MMOL/L (ref 99–110)
CO2 SERPL-SCNC: 33 MMOL/L (ref 21–32)
CO2 SERPL-SCNC: 33 MMOL/L (ref 21–32)
CO2 SERPL-SCNC: 34 MMOL/L (ref 21–32)
CREAT SERPL-MCNC: 0.5 MG/DL (ref 0.6–1.2)
CREAT SERPL-MCNC: <0.5 MG/DL (ref 0.6–1.2)
CREAT SERPL-MCNC: <0.5 MG/DL (ref 0.6–1.2)
DEPRECATED RDW RBC AUTO: 13.7 % (ref 12.4–15.4)
EOSINOPHIL # BLD: 0 K/UL (ref 0–0.6)
EOSINOPHIL NFR BLD: 0 %
GFR SERPLBLD CREATININE-BSD FMLA CKD-EPI: >60 ML/MIN/{1.73_M2}
GLUCOSE SERPL-MCNC: 103 MG/DL (ref 70–99)
GLUCOSE SERPL-MCNC: 119 MG/DL (ref 70–99)
GLUCOSE SERPL-MCNC: 175 MG/DL (ref 70–99)
HCT VFR BLD AUTO: 33.2 % (ref 36–48)
HGB BLD-MCNC: 11.1 G/DL (ref 12–16)
LYMPHOCYTES # BLD: 0.5 K/UL (ref 1–5.1)
LYMPHOCYTES NFR BLD: 3.7 %
MAGNESIUM SERPL-MCNC: 1.9 MG/DL (ref 1.8–2.4)
MAGNESIUM SERPL-MCNC: 1.9 MG/DL (ref 1.8–2.4)
MAGNESIUM SERPL-MCNC: 2 MG/DL (ref 1.8–2.4)
MCH RBC QN AUTO: 31.2 PG (ref 26–34)
MCHC RBC AUTO-ENTMCNC: 33.4 G/DL (ref 31–36)
MCV RBC AUTO: 93.4 FL (ref 80–100)
MONOCYTES # BLD: 0.3 K/UL (ref 0–1.3)
MONOCYTES NFR BLD: 2.7 %
NEUTROPHILS # BLD: 12 K/UL (ref 1.7–7.7)
NEUTROPHILS NFR BLD: 93.5 %
NT-PROBNP SERPL-MCNC: 1684 PG/ML (ref 0–449)
PHOSPHATE SERPL-MCNC: 3.1 MG/DL (ref 2.5–4.9)
PHOSPHATE SERPL-MCNC: 3.1 MG/DL (ref 2.5–4.9)
PHOSPHATE SERPL-MCNC: 3.2 MG/DL (ref 2.5–4.9)
PLATELET # BLD AUTO: 153 K/UL (ref 135–450)
PMV BLD AUTO: 9.2 FL (ref 5–10.5)
POTASSIUM SERPL-SCNC: 3.2 MMOL/L (ref 3.5–5.1)
POTASSIUM SERPL-SCNC: 4 MMOL/L (ref 3.5–5.1)
POTASSIUM SERPL-SCNC: 4 MMOL/L (ref 3.5–5.1)
RBC # BLD AUTO: 3.56 M/UL (ref 4–5.2)
REASON FOR REJECTION: NORMAL
REJECTED TEST: NORMAL
SODIUM SERPL-SCNC: 143 MMOL/L (ref 136–145)
SODIUM SERPL-SCNC: 145 MMOL/L (ref 136–145)
SODIUM SERPL-SCNC: 146 MMOL/L (ref 136–145)
WBC # BLD AUTO: 12.8 K/UL (ref 4–11)

## 2024-02-03 PROCEDURE — 85025 COMPLETE CBC W/AUTO DIFF WBC: CPT

## 2024-02-03 PROCEDURE — 6360000002 HC RX W HCPCS

## 2024-02-03 PROCEDURE — 2580000003 HC RX 258

## 2024-02-03 PROCEDURE — 6360000002 HC RX W HCPCS: Performed by: INTERNAL MEDICINE

## 2024-02-03 PROCEDURE — 94669 MECHANICAL CHEST WALL OSCILL: CPT

## 2024-02-03 PROCEDURE — 83880 ASSAY OF NATRIURETIC PEPTIDE: CPT

## 2024-02-03 PROCEDURE — 2060000000 HC ICU INTERMEDIATE R&B

## 2024-02-03 PROCEDURE — A4216 STERILE WATER/SALINE, 10 ML: HCPCS

## 2024-02-03 PROCEDURE — 74018 RADEX ABDOMEN 1 VIEW: CPT

## 2024-02-03 PROCEDURE — 94640 AIRWAY INHALATION TREATMENT: CPT

## 2024-02-03 PROCEDURE — 94761 N-INVAS EAR/PLS OXIMETRY MLT: CPT

## 2024-02-03 PROCEDURE — 2500000003 HC RX 250 WO HCPCS: Performed by: INTERNAL MEDICINE

## 2024-02-03 PROCEDURE — 87641 MR-STAPH DNA AMP PROBE: CPT

## 2024-02-03 PROCEDURE — 6370000000 HC RX 637 (ALT 250 FOR IP)

## 2024-02-03 PROCEDURE — 36415 COLL VENOUS BLD VENIPUNCTURE: CPT

## 2024-02-03 PROCEDURE — 83735 ASSAY OF MAGNESIUM: CPT

## 2024-02-03 PROCEDURE — 80069 RENAL FUNCTION PANEL: CPT

## 2024-02-03 PROCEDURE — 2700000000 HC OXYGEN THERAPY PER DAY

## 2024-02-03 PROCEDURE — C9113 INJ PANTOPRAZOLE SODIUM, VIA: HCPCS

## 2024-02-03 RX ORDER — THIAMINE HYDROCHLORIDE 100 MG/ML
100 INJECTION, SOLUTION INTRAMUSCULAR; INTRAVENOUS DAILY
Status: DISCONTINUED | OUTPATIENT
Start: 2024-02-03 | End: 2024-02-08 | Stop reason: HOSPADM

## 2024-02-03 RX ORDER — DIPHENHYDRAMINE HYDROCHLORIDE 50 MG/ML
25 INJECTION INTRAMUSCULAR; INTRAVENOUS ONCE
Status: COMPLETED | OUTPATIENT
Start: 2024-02-03 | End: 2024-02-03

## 2024-02-03 RX ORDER — LEVALBUTEROL INHALATION SOLUTION 0.63 MG/3ML
0.63 SOLUTION RESPIRATORY (INHALATION)
Status: DISCONTINUED | OUTPATIENT
Start: 2024-02-03 | End: 2024-02-08 | Stop reason: HOSPADM

## 2024-02-03 RX ORDER — POTASSIUM CHLORIDE 7.45 MG/ML
10 INJECTION INTRAVENOUS
Status: COMPLETED | OUTPATIENT
Start: 2024-02-03 | End: 2024-02-03

## 2024-02-03 RX ADMIN — APIXABAN 5 MG: 5 TABLET, FILM COATED ORAL at 10:18

## 2024-02-03 RX ADMIN — IPRATROPIUM BROMIDE 0.5 MG: 0.5 SOLUTION RESPIRATORY (INHALATION) at 16:30

## 2024-02-03 RX ADMIN — SODIUM CHLORIDE 25 ML: 9 INJECTION, SOLUTION INTRAVENOUS at 06:40

## 2024-02-03 RX ADMIN — METOPROLOL TARTRATE 5 MG: 1 INJECTION, SOLUTION INTRAVENOUS at 09:04

## 2024-02-03 RX ADMIN — MEROPENEM 1000 MG: 1 INJECTION INTRAVENOUS at 04:35

## 2024-02-03 RX ADMIN — BUDESONIDE 250 MCG: 0.25 INHALANT RESPIRATORY (INHALATION) at 10:52

## 2024-02-03 RX ADMIN — POTASSIUM CHLORIDE 10 MEQ: 10 INJECTION, SOLUTION INTRAVENOUS at 12:48

## 2024-02-03 RX ADMIN — THIAMINE HYDROCHLORIDE 100 MG: 100 INJECTION, SOLUTION INTRAMUSCULAR; INTRAVENOUS at 14:17

## 2024-02-03 RX ADMIN — IPRATROPIUM BROMIDE 0.5 MG: 0.5 SOLUTION RESPIRATORY (INHALATION) at 10:51

## 2024-02-03 RX ADMIN — DIPHENHYDRAMINE HYDROCHLORIDE 25 MG: 50 INJECTION INTRAMUSCULAR; INTRAVENOUS at 01:56

## 2024-02-03 RX ADMIN — MEROPENEM 1000 MG: 1 INJECTION INTRAVENOUS at 22:17

## 2024-02-03 RX ADMIN — MEROPENEM 1000 MG: 1 INJECTION INTRAVENOUS at 12:49

## 2024-02-03 RX ADMIN — POTASSIUM CHLORIDE 10 MEQ: 10 INJECTION, SOLUTION INTRAVENOUS at 10:06

## 2024-02-03 RX ADMIN — THERA TABS 1 TABLET: TAB at 10:17

## 2024-02-03 RX ADMIN — Medication: at 10:17

## 2024-02-03 RX ADMIN — SODIUM CHLORIDE 25 ML: 9 INJECTION, SOLUTION INTRAVENOUS at 22:16

## 2024-02-03 RX ADMIN — LEVALBUTEROL HYDROCHLORIDE 0.63 MG: 0.63 SOLUTION RESPIRATORY (INHALATION) at 20:56

## 2024-02-03 RX ADMIN — METHYLPREDNISOLONE SODIUM SUCCINATE 40 MG: 40 INJECTION, POWDER, LYOPHILIZED, FOR SOLUTION INTRAMUSCULAR; INTRAVENOUS at 03:32

## 2024-02-03 RX ADMIN — METOPROLOL TARTRATE 5 MG: 1 INJECTION, SOLUTION INTRAVENOUS at 14:17

## 2024-02-03 RX ADMIN — APIXABAN 5 MG: 5 TABLET, FILM COATED ORAL at 22:06

## 2024-02-03 RX ADMIN — SODIUM CHLORIDE, PRESERVATIVE FREE 10 ML: 5 INJECTION INTRAVENOUS at 22:18

## 2024-02-03 RX ADMIN — VANCOMYCIN HYDROCHLORIDE 750 MG: 10 INJECTION, POWDER, LYOPHILIZED, FOR SOLUTION INTRAVENOUS at 06:41

## 2024-02-03 RX ADMIN — METHYLPREDNISOLONE SODIUM SUCCINATE 40 MG: 40 INJECTION, POWDER, LYOPHILIZED, FOR SOLUTION INTRAMUSCULAR; INTRAVENOUS at 17:39

## 2024-02-03 RX ADMIN — IPRATROPIUM BROMIDE 0.5 MG: 0.5 SOLUTION RESPIRATORY (INHALATION) at 20:56

## 2024-02-03 RX ADMIN — FUROSEMIDE 20 MG: 10 INJECTION, SOLUTION INTRAMUSCULAR; INTRAVENOUS at 23:15

## 2024-02-03 RX ADMIN — SODIUM CHLORIDE, PRESERVATIVE FREE 10 ML: 5 INJECTION INTRAVENOUS at 10:19

## 2024-02-03 RX ADMIN — METHYLPREDNISOLONE SODIUM SUCCINATE 40 MG: 40 INJECTION, POWDER, LYOPHILIZED, FOR SOLUTION INTRAMUSCULAR; INTRAVENOUS at 09:04

## 2024-02-03 RX ADMIN — FOLIC ACID 5 MG: 1 TABLET ORAL at 10:18

## 2024-02-03 RX ADMIN — SODIUM CHLORIDE, PRESERVATIVE FREE 40 MG: 5 INJECTION INTRAVENOUS at 22:06

## 2024-02-03 RX ADMIN — LEVALBUTEROL HYDROCHLORIDE 0.63 MG: 0.63 SOLUTION RESPIRATORY (INHALATION) at 16:31

## 2024-02-03 RX ADMIN — METOPROLOL TARTRATE 5 MG: 1 INJECTION, SOLUTION INTRAVENOUS at 03:32

## 2024-02-03 RX ADMIN — POTASSIUM CHLORIDE 10 MEQ: 10 INJECTION, SOLUTION INTRAVENOUS at 11:17

## 2024-02-03 RX ADMIN — SODIUM CHLORIDE 25 ML: 9 INJECTION, SOLUTION INTRAVENOUS at 04:34

## 2024-02-03 RX ADMIN — LEVALBUTEROL HYDROCHLORIDE 0.63 MG: 0.63 SOLUTION RESPIRATORY (INHALATION) at 10:52

## 2024-02-03 RX ADMIN — POTASSIUM CHLORIDE 10 MEQ: 10 INJECTION, SOLUTION INTRAVENOUS at 08:51

## 2024-02-03 RX ADMIN — SODIUM CHLORIDE, PRESERVATIVE FREE 40 MG: 5 INJECTION INTRAVENOUS at 09:04

## 2024-02-03 RX ADMIN — VANCOMYCIN HYDROCHLORIDE 750 MG: 10 INJECTION, POWDER, LYOPHILIZED, FOR SOLUTION INTRAVENOUS at 17:40

## 2024-02-03 RX ADMIN — BUDESONIDE 250 MCG: 0.25 INHALANT RESPIRATORY (INHALATION) at 20:56

## 2024-02-03 RX ADMIN — FUROSEMIDE 20 MG: 10 INJECTION, SOLUTION INTRAMUSCULAR; INTRAVENOUS at 09:04

## 2024-02-03 ASSESSMENT — PAIN SCALES - WONG BAKER
WONGBAKER_NUMERICALRESPONSE: 0

## 2024-02-03 ASSESSMENT — PAIN SCALES - GENERAL
PAINLEVEL_OUTOF10: 0

## 2024-02-03 NOTE — PLAN OF CARE
Problem: Discharge Planning  Goal: Discharge to home or other facility with appropriate resources  Outcome: Progressing  Flowsheets (Taken 1/31/2024 1700 by Theodora Gonzales, RN)  Discharge to home or other facility with appropriate resources:   Identify barriers to discharge with patient and caregiver   Arrange for needed discharge resources and transportation as appropriate   Identify discharge learning needs (meds, wound care, etc)     Problem: Pain  Goal: Verbalizes/displays adequate comfort level or baseline comfort level  Outcome: Progressing  Flowsheets (Taken 2/1/2024 1802 by Gloria Munguia, RN)  Verbalizes/displays adequate comfort level or baseline comfort level:   Encourage patient to monitor pain and request assistance   Assess pain using appropriate pain scale     Problem: Safety - Adult  Goal: Free from fall injury  Outcome: Progressing  Flowsheets (Taken 2/1/2024 1802 by Gloria Munguia, RN)  Free From Fall Injury: Instruct family/caregiver on patient safety     Problem: Cardiovascular - Adult  Goal: Maintains optimal cardiac output and hemodynamic stability  Outcome: Progressing  Flowsheets (Taken 2/1/2024 1802 by Gloria Munguia RN)  Maintains optimal cardiac output and hemodynamic stability:   Monitor blood pressure and heart rate   Monitor urine output and notify Licensed Independent Practitioner for values outside of normal range   Assess for signs of decreased cardiac output     Problem: Cardiovascular - Adult  Goal: Absence of cardiac dysrhythmias or at baseline  Outcome: Progressing  Flowsheets (Taken 2/1/2024 1802 by Gloria Munguia, RN)  Absence of cardiac dysrhythmias or at baseline:   Monitor cardiac rate and rhythm   Assess for signs of decreased cardiac output   Administer antiarrhythmia medication and electrolyte replacement as ordered     Problem: Respiratory - Adult  Goal: Achieves optimal ventilation and oxygenation  Outcome: Progressing  Flowsheets (Taken 2/1/2024 1802 by Gloria Munguia

## 2024-02-03 NOTE — PROGRESS NOTES
Clinical Pharmacy Progress Note    Vancomycin - Management by Pharmacy    Consult Date(s): 1/28/24  Consulting Provider(s): Ara Haley MD     Assessment / Plan  Severe sepsis, PNA - Vancomycin  Concurrent Antimicrobials: Meropenem 7/10  Day of Vanc Therapy / Ordered Duration: 7/10  Current Dosing Method: Bayesian AUC Dosing   Therapeutic Goal: -600   Current Dose / Plan:   Admitted with NYA which has resolved. SCr stable at 0.5.   Currently on 750mg IV q12h.  Regimen predicts AUC of 569 mg/L*h  Continue current dose  Random level ordered for tomorrow in the AM  Will continue to monitor clinical condition and make adjustments to regimen as appropriate.    Please call with questions--  Kiara MohanD  PGY1 Resident   Ext. [phone]  2/3/2024 3:38 PM        Interval update:  on 8L of O2. Continue merrem and vanc for a total of 10 days per Dr. Parikh.     Subjective/Objective:   Zuleima Mir is a 83 y.o. female with a PMHx significant for COPD, T2DM, HTN, who presented to ED with AMS. On presentation, pt was noted to be in AFib, CXR showed extensive LLL and patchy RLL consolidation consistent with PNA. Admitted with sepsis, PNA, influenza and NYA.    Pharmacy is consulted to manage vancomycin.    Ht Readings from Last 1 Encounters:   01/28/24 1.575 m (5' 2\")     Wt Readings from Last 1 Encounters:   02/03/24 44.2 kg (97 lb 7.1 oz)     Current & Prior Antimicrobial Regimen(s):  Meropenem (1/28-current)  Vancomycin - Pharmacy to dose   (1/29-1/30)  750mg IV q12h (1/30-current)    Vancomycin Level(s) / Doses:  Date Time Dose Type of Level / Level Interpretation   1/29 05:48 500mg IV q24h Random = 13.6 mg/L Drawn ~3h after prior dose  Difficult to interpret level - likely still in distributive phase   1/30 06:25 750mg IV q18h Random = 20.5 mg/L Drawn ~30 min after prior dose hung (while dose infusing) -- inaccurate level    1/31 04:49 750mg IV q12h Random = 13 mg/L Drawn ~10h after prior dose  AUC = 538

## 2024-02-03 NOTE — PROGRESS NOTES
Provider called and requested morning labs be drawn stat. Called lab to request morning labs be drawn early.

## 2024-02-03 NOTE — PROGRESS NOTES
Update in A/P for 2/3:   Req NRB overnight, back to Venturi 6L this AM. Good diuresis. Continues to appear calm. Corpak successfully inserted, will get eliquis and some PO meds via corpak. Continue Lopressir via IV for now. TF started today.     - if lose NGT - revert back to therapeutic Lovenix bid     ##AMS 2/2 to pneumonia   Likely component of influenza A  Meets severe sepsis with organ dysfunction  Subsegmental pulmonary embolism  Hx of COPD  Progressively worsening of weakness, PO intake, fatigue, and AMS over the past few weeks. Daughter endorses she had some coughs as well. LA 3.4-> 12.7. CXR showed extensive left lower lung and patchy right lower lung consolidation most compatible with multifocal pneumonia. Received septic bolus. CTPE showing some mucus plugging in the bilateral lower lobes. Waxing and waning O2 requirements. Now with adequate hydration; given change in examination with JVD and worsened CXR findings, suspicion for now volume overload. At this time, still suspect volume overload, although JVD improved. Will continue to treat with diuresis; if pt becomes hypotensive however, will replenish fluids as needed. Improved mentation and O2 requirements this am  - On empiric antibiotics   - Meropenem    - Vancomycin  - Influenza positive  - Blood cultures, legionella, MRSA, pneumonia panel, strep pneumo antigen -- positive for staph epi on 1/2 cultures, likely contaminant. Awaiting collection of MRSA probe to stop vancomycin  - CTPE on 1/28   - Bilateral pulmonary consolidations for pneumonia   - Mucus plugging in lower lobes  - Lower extremity dopplers negative for DVT  - Pulmonology consulted; appreciate recs   - Bronchodilators and 3% hypertonic saline ordered. Mucomyst added   - Continue heparin drip and antibiotics regimen  - Lasix ordered; hold fluids. Trend CXR   - Continuous telemetry and daily labs ordered  - Palliative following  - SLP evaluation to be re-attempted today.   - Given  poor-no oral intake, ordered NGT and tube feeds. Dietitian consulted to help manage. Will monitor closely for refeeding syndrome  - On nonrebreather now; RT attempting to titrate down and switch to venturi mask or NC as needed    ##New-onset AFIB, rhythm-controlled  Daughter mentioned she never had afib before nor does she have any records of it per chart review. HR at 180s and she was started on dilt which reduced her HR to ~140-160s however did contribute to her hypotension. She responded well to septic bolus as well as 1L LR bolus. Was given digoxin 500 mcg IV at the ED as well. Briefly reverted back to afib during admission but back in NSR after amio bolus.   - Cardiology consulted; appreciate recs  - amio gtt stopped - now just Lopressor IV   - Echo ordered; EF 55-60%. Aortic stenosis present  - coprak placed successfully - Eliqiuis PO resumed     ## Dark stools, resolved  Pt with recent worsening of abdominal pain. Noted to have several episodes of dark stool during admission  - Heparin drip held, but restarted given concern for thromboembolism  - Will trend Hgb with regular H&H, hold heparin gtt if significant drop. Has remained stable  - GI consulted; appreciate recs   - No intervention at this time, can consider EGD if Hgb drops significantly but has remained stable.    ## Dilated bile ducts/prominent pancreatic duct  Incidentally found on CT. Liver enzymes significant for elevated AST but otherwise normal. No abdominal tenderness appreciated  - GI consulted; appreciate recs  - Liver enzymes normalized    #NYA, resolved  Likely due to renal hypoperfusion with component of cardiogenic shock vs decreased PO intake  - Resolved with fluid resuscitation and NSR      DVT PPx: Heparin ggt  Diet: Diet NPO exceptions are sips of water with meds  Code status:  Limited x4  ELOS: 3 days  Barriers to discharge: severe sepsis,  Disposition  - Preadmission: Home  - Current: IP  - Upon discharge: Home    Jesus Parikh,

## 2024-02-03 NOTE — PROGRESS NOTES
Patient removed o2 multiple times, desats to 83%, and then recently removed corpak. Pt a&ox1 to self. Replaced both per MD. Restraints placed per MD order to ensure lines are not pulled. Daughter at bedside in agreement with plan.     Electronically signed by Argenis Finnegan RN on 2/3/2024 at 12:23 PM

## 2024-02-03 NOTE — PROGRESS NOTES
PT desatting into low 80's on VenturiMask, placed on NRB. Pt satting in high 90's on NRB. Pt keeps pulling mask off. Pt is very dry. Provided oral care, but she just wanted to be left alone.

## 2024-02-03 NOTE — PROGRESS NOTES
Lab called. Pt potassium came back critical, however  said the specimen was hemolyzed. Requested it be renal panel be redrawn with morning labs.

## 2024-02-03 NOTE — PROGRESS NOTES
Tube feeds started per order. Pt tolerating well.     Electronically signed by Argenis Finnegan RN on 2/3/2024 at 5:44 PM

## 2024-02-03 NOTE — PLAN OF CARE
Problem: Discharge Planning  Goal: Discharge to home or other facility with appropriate resources  2/3/2024 1813 by Argenis Finnegan RN  Outcome: Progressing  Flowsheets (Taken 2/3/2024 1813)  Discharge to home or other facility with appropriate resources:   Identify barriers to discharge with patient and caregiver   Identify discharge learning needs (meds, wound care, etc)    Problem: Safety - Adult  Goal: Free from fall injury  2/3/2024 1813 by Argenis Finnegan RN  Outcome: Progressing  Flowsheets (Taken 2/3/2024 1813)  Free From Fall Injury:   Instruct family/caregiver on patient safety   Based on caregiver fall risk screen, instruct family/caregiver to ask for assistance with transferring infant if caregiver noted to have fall risk factors       Problem: Safety - Medical Restraint  Goal: Remains free of injury from restraints (Restraint for Interference with Medical Device)  Description: INTERVENTIONS:  1. Determine that other, less restrictive measures have been tried or would not be effective before applying the restraint  2. Evaluate the patient's condition at the time of restraint application  3. Inform patient/family regarding the reason for restraint  4. Q2H: Monitor safety, psychosocial status, comfort, nutrition and hydration  Outcome: Progressing  Flowsheets  Taken 2/3/2024 1813  Remains free of injury from restraints (restraint for interference with medical device):   Determine that other, less restrictive measures have been tried or would not be effective before applying the restraint   Evaluate the patient's condition at the time of restraint application   Every 2 hours: Monitor safety, psychosocial status, comfort, nutrition and hydration   Inform patient/family regarding the reason for restraint  Taken 2/3/2024 1203  Remains free of injury from restraints (restraint for interference with medical device):   Evaluate the patient's condition at the time of restraint application   Determine that other,

## 2024-02-04 ENCOUNTER — APPOINTMENT (OUTPATIENT)
Dept: CT IMAGING | Age: 84
DRG: 871 | End: 2024-02-04
Payer: MEDICARE

## 2024-02-04 ENCOUNTER — APPOINTMENT (OUTPATIENT)
Dept: GENERAL RADIOLOGY | Age: 84
DRG: 871 | End: 2024-02-04
Payer: MEDICARE

## 2024-02-04 LAB
ALBUMIN SERPL-MCNC: 2 G/DL (ref 3.4–5)
ALBUMIN SERPL-MCNC: 2.1 G/DL (ref 3.4–5)
ANION GAP SERPL CALCULATED.3IONS-SCNC: 6 MMOL/L (ref 3–16)
ANION GAP SERPL CALCULATED.3IONS-SCNC: 7 MMOL/L (ref 3–16)
BASOPHILS # BLD: 0 K/UL (ref 0–0.2)
BASOPHILS NFR BLD: 0 %
BUN SERPL-MCNC: 36 MG/DL (ref 7–20)
BUN SERPL-MCNC: 38 MG/DL (ref 7–20)
CALCIUM SERPL-MCNC: 8 MG/DL (ref 8.3–10.6)
CALCIUM SERPL-MCNC: 8.1 MG/DL (ref 8.3–10.6)
CHLORIDE SERPL-SCNC: 102 MMOL/L (ref 99–110)
CHLORIDE SERPL-SCNC: 102 MMOL/L (ref 99–110)
CO2 SERPL-SCNC: 32 MMOL/L (ref 21–32)
CO2 SERPL-SCNC: 34 MMOL/L (ref 21–32)
CREAT SERPL-MCNC: <0.5 MG/DL (ref 0.6–1.2)
CREAT SERPL-MCNC: <0.5 MG/DL (ref 0.6–1.2)
DEPRECATED RDW RBC AUTO: 13.8 % (ref 12.4–15.4)
EOSINOPHIL # BLD: 0 K/UL (ref 0–0.6)
EOSINOPHIL NFR BLD: 0 %
GFR SERPLBLD CREATININE-BSD FMLA CKD-EPI: >60 ML/MIN/{1.73_M2}
GFR SERPLBLD CREATININE-BSD FMLA CKD-EPI: >60 ML/MIN/{1.73_M2}
GLUCOSE SERPL-MCNC: 136 MG/DL (ref 70–99)
GLUCOSE SERPL-MCNC: 162 MG/DL (ref 70–99)
HCT VFR BLD AUTO: 30.4 % (ref 36–48)
HGB BLD-MCNC: 10.3 G/DL (ref 12–16)
LYMPHOCYTES # BLD: 0.5 K/UL (ref 1–5.1)
LYMPHOCYTES NFR BLD: 4.5 %
MAGNESIUM SERPL-MCNC: 1.8 MG/DL (ref 1.8–2.4)
MAGNESIUM SERPL-MCNC: 1.9 MG/DL (ref 1.8–2.4)
MCH RBC QN AUTO: 31.6 PG (ref 26–34)
MCHC RBC AUTO-ENTMCNC: 33.9 G/DL (ref 31–36)
MCV RBC AUTO: 93.3 FL (ref 80–100)
MONOCYTES # BLD: 0.4 K/UL (ref 0–1.3)
MONOCYTES NFR BLD: 4 %
MRSA DNA SPEC QL NAA+PROBE: NORMAL
NEUTROPHILS # BLD: 9.5 K/UL (ref 1.7–7.7)
NEUTROPHILS NFR BLD: 91.5 %
PHOSPHATE SERPL-MCNC: 2.5 MG/DL (ref 2.5–4.9)
PHOSPHATE SERPL-MCNC: 2.6 MG/DL (ref 2.5–4.9)
PLATELET # BLD AUTO: 155 K/UL (ref 135–450)
PMV BLD AUTO: 9.6 FL (ref 5–10.5)
POTASSIUM SERPL-SCNC: 3.4 MMOL/L (ref 3.5–5.1)
POTASSIUM SERPL-SCNC: 4.1 MMOL/L (ref 3.5–5.1)
RBC # BLD AUTO: 3.26 M/UL (ref 4–5.2)
SODIUM SERPL-SCNC: 141 MMOL/L (ref 136–145)
SODIUM SERPL-SCNC: 142 MMOL/L (ref 136–145)
VANCOMYCIN SERPL-MCNC: 21.1 UG/ML
WBC # BLD AUTO: 10.3 K/UL (ref 4–11)

## 2024-02-04 PROCEDURE — 71250 CT THORAX DX C-: CPT

## 2024-02-04 PROCEDURE — 2580000003 HC RX 258

## 2024-02-04 PROCEDURE — 94640 AIRWAY INHALATION TREATMENT: CPT

## 2024-02-04 PROCEDURE — 97535 SELF CARE MNGMENT TRAINING: CPT

## 2024-02-04 PROCEDURE — 6370000000 HC RX 637 (ALT 250 FOR IP)

## 2024-02-04 PROCEDURE — 80202 ASSAY OF VANCOMYCIN: CPT

## 2024-02-04 PROCEDURE — 94669 MECHANICAL CHEST WALL OSCILL: CPT

## 2024-02-04 PROCEDURE — A4216 STERILE WATER/SALINE, 10 ML: HCPCS

## 2024-02-04 PROCEDURE — C9113 INJ PANTOPRAZOLE SODIUM, VIA: HCPCS

## 2024-02-04 PROCEDURE — 6360000002 HC RX W HCPCS

## 2024-02-04 PROCEDURE — 85025 COMPLETE CBC W/AUTO DIFF WBC: CPT

## 2024-02-04 PROCEDURE — 6360000002 HC RX W HCPCS: Performed by: INTERNAL MEDICINE

## 2024-02-04 PROCEDURE — 97530 THERAPEUTIC ACTIVITIES: CPT

## 2024-02-04 PROCEDURE — 97166 OT EVAL MOD COMPLEX 45 MIN: CPT

## 2024-02-04 PROCEDURE — 83735 ASSAY OF MAGNESIUM: CPT

## 2024-02-04 PROCEDURE — 94761 N-INVAS EAR/PLS OXIMETRY MLT: CPT

## 2024-02-04 PROCEDURE — 97162 PT EVAL MOD COMPLEX 30 MIN: CPT

## 2024-02-04 PROCEDURE — 71045 X-RAY EXAM CHEST 1 VIEW: CPT

## 2024-02-04 PROCEDURE — 80069 RENAL FUNCTION PANEL: CPT

## 2024-02-04 PROCEDURE — 2500000003 HC RX 250 WO HCPCS: Performed by: INTERNAL MEDICINE

## 2024-02-04 PROCEDURE — 36415 COLL VENOUS BLD VENIPUNCTURE: CPT

## 2024-02-04 PROCEDURE — 2700000000 HC OXYGEN THERAPY PER DAY

## 2024-02-04 PROCEDURE — 2060000000 HC ICU INTERMEDIATE R&B

## 2024-02-04 RX ORDER — POTASSIUM CHLORIDE 7.45 MG/ML
10 INJECTION INTRAVENOUS
Status: COMPLETED | OUTPATIENT
Start: 2024-02-04 | End: 2024-02-04

## 2024-02-04 RX ORDER — HYDROXYZINE HYDROCHLORIDE 10 MG/1
10 TABLET, FILM COATED ORAL 3 TIMES DAILY PRN
Status: DISCONTINUED | OUTPATIENT
Start: 2024-02-04 | End: 2024-02-08 | Stop reason: HOSPADM

## 2024-02-04 RX ADMIN — Medication: at 01:27

## 2024-02-04 RX ADMIN — SODIUM CHLORIDE, PRESERVATIVE FREE 40 MG: 5 INJECTION INTRAVENOUS at 21:21

## 2024-02-04 RX ADMIN — SODIUM CHLORIDE 25 ML: 9 INJECTION, SOLUTION INTRAVENOUS at 21:29

## 2024-02-04 RX ADMIN — MEROPENEM 1000 MG: 1 INJECTION INTRAVENOUS at 21:30

## 2024-02-04 RX ADMIN — SODIUM CHLORIDE 25 ML: 9 INJECTION, SOLUTION INTRAVENOUS at 03:22

## 2024-02-04 RX ADMIN — POTASSIUM CHLORIDE 10 MEQ: 10 INJECTION, SOLUTION INTRAVENOUS at 09:51

## 2024-02-04 RX ADMIN — LEVALBUTEROL HYDROCHLORIDE 0.63 MG: 0.63 SOLUTION RESPIRATORY (INHALATION) at 15:55

## 2024-02-04 RX ADMIN — MEROPENEM 1000 MG: 1 INJECTION INTRAVENOUS at 11:05

## 2024-02-04 RX ADMIN — THERA TABS 1 TABLET: TAB at 08:38

## 2024-02-04 RX ADMIN — LEVALBUTEROL HYDROCHLORIDE 0.63 MG: 0.63 SOLUTION RESPIRATORY (INHALATION) at 20:41

## 2024-02-04 RX ADMIN — APIXABAN 5 MG: 5 TABLET, FILM COATED ORAL at 08:39

## 2024-02-04 RX ADMIN — METHYLPREDNISOLONE SODIUM SUCCINATE 40 MG: 40 INJECTION, POWDER, LYOPHILIZED, FOR SOLUTION INTRAMUSCULAR; INTRAVENOUS at 03:02

## 2024-02-04 RX ADMIN — SODIUM CHLORIDE, PRESERVATIVE FREE 10 ML: 5 INJECTION INTRAVENOUS at 08:38

## 2024-02-04 RX ADMIN — METHYLPREDNISOLONE SODIUM SUCCINATE 40 MG: 40 INJECTION, POWDER, LYOPHILIZED, FOR SOLUTION INTRAMUSCULAR; INTRAVENOUS at 08:39

## 2024-02-04 RX ADMIN — SODIUM CHLORIDE, PRESERVATIVE FREE 10 ML: 5 INJECTION INTRAVENOUS at 21:33

## 2024-02-04 RX ADMIN — HYDROXYZINE HYDROCHLORIDE 10 MG: 10 TABLET, FILM COATED ORAL at 08:39

## 2024-02-04 RX ADMIN — BUDESONIDE 250 MCG: 0.25 INHALANT RESPIRATORY (INHALATION) at 20:41

## 2024-02-04 RX ADMIN — IPRATROPIUM BROMIDE 0.5 MG: 0.5 SOLUTION RESPIRATORY (INHALATION) at 20:41

## 2024-02-04 RX ADMIN — Medication: at 08:42

## 2024-02-04 RX ADMIN — METOPROLOL TARTRATE 5 MG: 1 INJECTION, SOLUTION INTRAVENOUS at 03:02

## 2024-02-04 RX ADMIN — APIXABAN 5 MG: 5 TABLET, FILM COATED ORAL at 21:21

## 2024-02-04 RX ADMIN — BUDESONIDE 250 MCG: 0.25 INHALANT RESPIRATORY (INHALATION) at 07:43

## 2024-02-04 RX ADMIN — MEROPENEM 1000 MG: 1 INJECTION INTRAVENOUS at 03:23

## 2024-02-04 RX ADMIN — IPRATROPIUM BROMIDE 0.5 MG: 0.5 SOLUTION RESPIRATORY (INHALATION) at 12:01

## 2024-02-04 RX ADMIN — LEVALBUTEROL HYDROCHLORIDE 0.63 MG: 0.63 SOLUTION RESPIRATORY (INHALATION) at 07:43

## 2024-02-04 RX ADMIN — IPRATROPIUM BROMIDE 0.5 MG: 0.5 SOLUTION RESPIRATORY (INHALATION) at 07:43

## 2024-02-04 RX ADMIN — SODIUM CHLORIDE 25 ML: 9 INJECTION, SOLUTION INTRAVENOUS at 06:25

## 2024-02-04 RX ADMIN — METHYLPREDNISOLONE SODIUM SUCCINATE 40 MG: 40 INJECTION, POWDER, LYOPHILIZED, FOR SOLUTION INTRAMUSCULAR; INTRAVENOUS at 20:03

## 2024-02-04 RX ADMIN — FOLIC ACID 5 MG: 1 TABLET ORAL at 08:38

## 2024-02-04 RX ADMIN — SODIUM CHLORIDE, PRESERVATIVE FREE 40 MG: 5 INJECTION INTRAVENOUS at 08:39

## 2024-02-04 RX ADMIN — THIAMINE HYDROCHLORIDE 100 MG: 100 INJECTION, SOLUTION INTRAMUSCULAR; INTRAVENOUS at 08:39

## 2024-02-04 RX ADMIN — POTASSIUM CHLORIDE 10 MEQ: 10 INJECTION, SOLUTION INTRAVENOUS at 08:44

## 2024-02-04 RX ADMIN — POTASSIUM CHLORIDE 10 MEQ: 10 INJECTION, SOLUTION INTRAVENOUS at 10:59

## 2024-02-04 RX ADMIN — IPRATROPIUM BROMIDE 0.5 MG: 0.5 SOLUTION RESPIRATORY (INHALATION) at 15:56

## 2024-02-04 RX ADMIN — Medication: at 22:36

## 2024-02-04 RX ADMIN — LEVALBUTEROL HYDROCHLORIDE 0.63 MG: 0.63 SOLUTION RESPIRATORY (INHALATION) at 12:02

## 2024-02-04 RX ADMIN — VANCOMYCIN HYDROCHLORIDE 750 MG: 10 INJECTION, POWDER, LYOPHILIZED, FOR SOLUTION INTRAVENOUS at 06:26

## 2024-02-04 ASSESSMENT — PAIN SCALES - WONG BAKER
WONGBAKER_NUMERICALRESPONSE: 0

## 2024-02-04 ASSESSMENT — PAIN SCALES - GENERAL
PAINLEVEL_OUTOF10: 0

## 2024-02-04 NOTE — PROGRESS NOTES
Speech Therapy Attempt Note    Attempt to see pt for dysphagia f/u. Per RN, pt with PT/OT at this time.     Of note: pt currently with NG tube for nutrition. Per RN pt continues to cough with all PO presentations and requires suction, RN concern re: aspiration PNA. Per RN, pt would have to have nurse present for MBSS, if recommended d/t elevated O2 requirements.

## 2024-02-04 NOTE — PROGRESS NOTES
MD Raymond      XR CHEST PORTABLE    (Results Pending)       Assessment/Plan:     Update in A/P for 2/4:   Cont require increased o2 req, 15 > 9 > 12L. Net neg 14L this admission, still with JVD and appears to be able to need a bit more diuresis with freq electrolyte checks. [employer] Corpak. Switch IV lopressor to PO lopressor. Tolerating tube feeds, continue serial RFP checks.    - if lose NGT - revert back to therapeutic Lovenix bid  If BP low on lopressor - need to consider restarting amio?     ##AMS 2/2 to pneumonia   Likely component of influenza A  Meets severe sepsis with organ dysfunction  Subsegmental pulmonary embolism  Hx of COPD  Progressively worsening of weakness, PO intake, fatigue, and AMS over the past few weeks. Daughter endorses she had some coughs as well. LA 3.4-> 12.7. CXR showed extensive left lower lung and patchy right lower lung consolidation most compatible with multifocal pneumonia. Received septic bolus. CTPE showing some mucus plugging in the bilateral lower lobes. Waxing and waning O2 requirements. Now with adequate hydration; given change in examination with JVD and worsened CXR findings, suspicion for now volume overload. At this time, still suspect volume overload, although JVD improved. Will continue to treat with diuresis; if pt becomes hypotensive however, will replenish fluids as needed. Improved mentation and O2 requirements this am  - On empiric antibiotics   - Meropenem    - Vancomycin  - Influenza positive  - Blood cultures, legionella, MRSA, pneumonia panel, strep pneumo antigen -- positive for staph epi on 1/2 cultures, likely contaminant. Awaiting collection of MRSA probe to stop vancomycin  - CTPE on 1/28   - Bilateral pulmonary consolidations for pneumonia   - Mucus plugging in lower lobes  - Lower extremity dopplers negative for DVT  - Pulmonology consulted; appreciate recs   - Bronchodilators and 3% hypertonic saline ordered. Mucomyst added   - Continue heparin drip and  antibiotics regimen  - Lasix ordered; hold fluids. Trend CXR   - Continuous telemetry and daily labs ordered  - Palliative following  - SLP evaluation to be re-attempted today.   - Given poor-no oral intake, ordered NGT and tube feeds. Dietitian consulted to help manage. Will monitor closely for refeeding syndrome  - On nonrebreather now; RT attempting to titrate down and switch to venturi mask or NC as needed    ##New-onset AFIB, rhythm-controlled  Daughter mentioned she never had afib before nor does she have any records of it per chart review. HR at 180s and she was started on dilt which reduced her HR to ~140-160s however did contribute to her hypotension. She responded well to septic bolus as well as 1L LR bolus. Was given digoxin 500 mcg IV at the ED as well. Briefly reverted back to afib during admission but back in NSR after amio bolus.   - Cardiology consulted; appreciate recs  - amio gtt stopped, no lopressor - switched to PO today  - Echo ordered; EF 55-60%. Aortic stenosis present  - coprak placed successfully - Eliqiuis PO resumed     ## Dark stools, resolved  Pt with recent worsening of abdominal pain. Noted to have several episodes of dark stool during admission  - Heparin drip held, but restarted given concern for thromboembolism  - Will trend Hgb with regular H&H, hold heparin gtt if significant drop. Has remained stable  - GI consulted; appreciate recs   - No intervention at this time, can consider EGD if Hgb drops significantly but has remained stable.    ## Dilated bile ducts/prominent pancreatic duct  Incidentally found on CT. Liver enzymes significant for elevated AST but otherwise normal. No abdominal tenderness appreciated  - GI consulted; appreciate recs  - Liver enzymes normalized    #NYA, resolved  Likely due to renal hypoperfusion with component of cardiogenic shock vs decreased PO intake  - Resolved with fluid resuscitation and NSR      DVT PPx: Heparin ggt  Diet: Diet NPO exceptions are

## 2024-02-04 NOTE — PROGRESS NOTES
Physical Therapy  [address]  [address] Initial Assessment and treatment    Name: Zuleima Mir  : 1940  MRN: [id number]  Date of Service: 2024    Discharge Recommendations:  Subacute/Skilled Nursing Facility   PT Equipment Recommendations  Equipment Needed: No (defer to next level of care)      Patient Diagnosis(es): The primary encounter diagnosis was Septicemia (HCC). Diagnoses of Pneumonia of both lower lobes due to infectious organism, Acute kidney injury (HCC), Atrial fibrillation with RVR (HCC), Acute hypoxic respiratory failure (HCC), and Influenza A were also pertinent to this visit.  Past Medical History:  has a past medical history of Allergic rhinitis, Emphysema (subcutaneous) (surgical) resulting from a procedure, Hyperlipidemia, Hypertension, IBS (irritable bowel syndrome), Pancreatic cyst, Type II or unspecified type diabetes mellitus without mention of complication, not stated as uncontrolled, Wears hearing aid in both ears, and Wears partial dentures.  Past Surgical History:  has a past surgical history that includes Cholecystectomy; Hysterectomy; Breast surgery; Colonoscopy; Upper gastrointestinal endoscopy (N/A, 2020); and Upper gastrointestinal endoscopy (2020).    Assessment   Body Structures, Functions, Activity Limitations Requiring Skilled Therapeutic Intervention: Decreased functional mobility ;Decreased cognition;Decreased endurance;Increased pain;Decreased balance;Decreased strength;Decreased safe awareness  Assessment: Patient tolerated session fair with mobility being limited due to LLE pain, decreased cognition and overall decreased activity tolerance.  Patient able to sit EOB with heavy assist x 2, demonstrates fair static sitting balance one positioned in midline.  Patient with multiple lines (IV, corpak, abrams, B wrist restraints) and inconsistent command following.  Patient profoundly hard of hearing and most communication done via

## 2024-02-04 NOTE — PLAN OF CARE
Problem: Discharge Planning  Goal: Discharge to home or other facility with appropriate resources  2/3/2024 2330 by Tatiana Peoples RN  Outcome: Progressing  Flowsheets (Taken 2/3/2024 1813 by Argenis Finnegan RN)  Discharge to home or other facility with appropriate resources:   Identify barriers to discharge with patient and caregiver   Identify discharge learning needs (meds, wound care, etc)     Problem: Pain  Goal: Verbalizes/displays adequate comfort level or baseline comfort level  2/3/2024 2330 by Tatiana Peoples RN  Outcome: Progressing  Flowsheets (Taken 2/3/2024 1813 by Argenis Finnegan, BRITTNEY)  Verbalizes/displays adequate comfort level or baseline comfort level:   Encourage patient to monitor pain and request assistance   Administer analgesics based on type and severity of pain and evaluate response   Assess pain using appropriate pain scale     Problem: Safety - Adult  Goal: Free from fall injury  2/3/2024 2330 by Tatiana Peoples RN  Outcome: Progressing  Flowsheets (Taken 2/3/2024 1813 by Argenis Finnegan RN)  Free From Fall Injury:   Instruct family/caregiver on patient safety   Based on caregiver fall risk screen, instruct family/caregiver to ask for assistance with transferring infant if caregiver noted to have fall risk factors     Problem: Neurosensory - Adult  Goal: Achieves stable or improved neurological status  Outcome: Progressing  Flowsheets (Taken 1/30/2024 1259 by Jody Silva, RN)  Achieves stable or improved neurological status:   Assess for and report changes in neurological status   Initiate measures to prevent increased intracranial pressure   Maintain blood pressure and fluid volume within ordered parameters to optimize cerebral perfusion and minimize risk of hemorrhage     Problem: Cardiovascular - Adult  Goal: Maintains optimal cardiac output and hemodynamic stability  Outcome: Progressing  Flowsheets (Taken 2/1/2024 1802 by Gloria Munguia, RN)  Maintains optimal cardiac output and

## 2024-02-04 NOTE — PROGRESS NOTES
Occupational Therapy  Facility/Department: [address]  Occupational Therapy Initial Assessment/ Treatment    Name: Zuleima Mir  : 1940  MRN: [id number]  Date of Service: 2024    Discharge Recommendations:     OT Equipment Recommendations  Equipment Needed: No  Other: defer   Zuleima Mir scored a  on the AM-PAC ADL Inpatient form. Current research shows that an AM-PAC score of 17 or less is typically not associated with a discharge to the patient's home setting. Based on the patient's AM-PAC score and their current ADL deficits, it is recommended that the patient have 3-5 sessions per week of Occupational Therapy at d/c to increase the patient's independence.  Please see assessment section for further patient specific details.    If patient discharges prior to next session this note will serve as a discharge summary.  Please see below for the latest assessment towards goals.      Patient Diagnosis(es): The primary encounter diagnosis was Septicemia (HCC). Diagnoses of Pneumonia of both lower lobes due to infectious organism, Acute kidney injury (HCC), Atrial fibrillation with RVR (HCC), Acute hypoxic respiratory failure (HCC), and Influenza A were also pertinent to this visit.  Past Medical History:  has a past medical history of Allergic rhinitis, Emphysema (subcutaneous) (surgical) resulting from a procedure, Hyperlipidemia, Hypertension, IBS (irritable bowel syndrome), Pancreatic cyst, Type II or unspecified type diabetes mellitus without mention of complication, not stated as uncontrolled, Wears hearing aid in both ears, and Wears partial dentures.  Past Surgical History:  has a past surgical history that includes Cholecystectomy; Hysterectomy; Breast surgery; Colonoscopy; Upper gastrointestinal endoscopy (N/A, 2020); and Upper gastrointestinal endoscopy (2020).    Treatment Diagnosis: decreased ADLs and transfers secondary to flu      Assessment   Performance deficits / Impairments:  Decreased functional mobility ;Decreased endurance;Decreased coordination;Decreased ADL status;Decreased balance;Decreased strength;Decreased high-level IADLs;Decreased cognition  Assessment: Prior to admission pt was living at home with her daughters, per daughter pt was independent with toileting and mobility. Pt now presents significantly below her baseline, demonstrating dependent x 2 for bed mobility and max A progressing to CGA for sitting balance. Pt with significant weakness, able to sit EOB x 8 mins. Pt would benefit from continued OT while in acute care. Recommend pt to discharge to IP OT setting prior to returning home.  Treatment Diagnosis: decreased ADLs and transfers secondary to flu  Prognosis: Fair  Decision Making: Medium Complexity  REQUIRES OT FOLLOW-UP: Yes  Activity Tolerance  Activity Tolerance: Patient limited by fatigue  Activity Tolerance Comments: Pt reported increased fatigue after sitting EOB x 8 mins, attempting to return to supine position        Plan   Occupational Therapy Plan  Times Per Week: 2-5  Times Per Day: Once a day  Current Treatment Recommendations: Strengthening, Coordination training, Self-Care / ADL, Safety education & training, Functional mobility training, Patient/Caregiver education & training, Endurance training, Neuromuscular re-education     Restrictions  Position Activity Restriction  Other position/activity restrictions: up with assistance    Subjective   General  Chart Reviewed: Yes  Patient assessed for rehabilitation services?: Yes  Additional Pertinent Hx: 83 y.o. female brought into ED from her family after c/o SOB, AMS and progressive weakness. Influenza A +, placed on O2. PMHx includes: emphysema, HTN, hyperlipidemia, IBS, and DMII.  Family / Caregiver Present: Yes (daughter sleeping at pts bedside- waking to clarify social history info)  Referring Practitioner: MD Delmi  Diagnosis: septicemia  Subjective  Subjective: Pt semi supine in bed upon arrival,

## 2024-02-04 NOTE — CONSULTS
Clinical Pharmacy Progress Note    Vancomycin has been discontinued. Pharmacy will sign off. Please re-consult pharmacy if vancomycin dosing is wanted in the future.    Please call with questions.  Thanks     Kiara MohanD  PGY1 Resident   Ext. [phone]  2/4/2024 11:18 AM

## 2024-02-04 NOTE — RT PROTOCOL NOTE
RT Inhaler-Nebulizer Bronchodilator Protocol Note    There is a bronchodilator order in the chart from a provider indicating to follow the RT Bronchodilator Protocol and there is an “Initiate RT Inhaler-Nebulizer Bronchodilator Protocol” order as well (see protocol at bottom of note).    CXR Findings:  XR CHEST PORTABLE    Result Date: 2/4/2024  Bilateral CAD airspace disease with a cavitary lesion present in the right lower lobe. This is new in comparison to January 28, 2024.      The findings from the last RT Protocol Assessment were as follows:   History Pulmonary Disease: Chronic pulmonary disease  Respiratory Pattern: Regular pattern and RR 12-20 bpm  Breath Sounds: Slightly diminished and/or crackles  Cough: Weak, productive  Indication for Bronchodilator Therapy: On home bronchodilators  Bronchodilator Assessment Score: 6    Aerosolized bronchodilator medication orders have been revised according to the RT Inhaler-Nebulizer Bronchodilator Protocol below.    Respiratory Therapist to perform RT Therapy Protocol Assessment initially then follow the protocol.  Repeat RT Therapy Protocol Assessment PRN for score 0-3 or on second treatment, BID, and PRN for scores above 3.    No Indications - adjust the frequency to every 6 hours PRN wheezing or bronchospasm, if no treatments needed after 48 hours then discontinue using Per Protocol order mode.     If indication present, adjust the RT bronchodilator orders based on the Bronchodilator Assessment Score as indicated below.  Use Inhaler orders unless patient has one or more of the following: on home nebulizer, not able to hold breath for 10 seconds, is not alert and oriented, cannot activate and use MDI correctly, or respiratory rate 25 breaths per minute or more, then use the equivalent nebulizer order(s) with same Frequency and PRN reasons based on the score.  If a patient is on this medication at home then do not decrease Frequency below that used at home.    0-3 -

## 2024-02-05 ENCOUNTER — APPOINTMENT (OUTPATIENT)
Dept: GENERAL RADIOLOGY | Age: 84
DRG: 871 | End: 2024-02-05
Payer: MEDICARE

## 2024-02-05 PROBLEM — R91.8 PULMONARY NODULES/LESIONS, MULTIPLE: Status: ACTIVE | Noted: 2024-02-05

## 2024-02-05 PROBLEM — J96.01 ACUTE HYPOXIC RESPIRATORY FAILURE (HCC): Status: ACTIVE | Noted: 2024-02-05

## 2024-02-05 LAB
ALBUMIN SERPL-MCNC: 2 G/DL (ref 3.4–5)
ALBUMIN SERPL-MCNC: 2.1 G/DL (ref 3.4–5)
ALBUMIN SERPL-MCNC: 2.2 G/DL (ref 3.4–5)
ANION GAP SERPL CALCULATED.3IONS-SCNC: 5 MMOL/L (ref 3–16)
ANION GAP SERPL CALCULATED.3IONS-SCNC: 6 MMOL/L (ref 3–16)
ANION GAP SERPL CALCULATED.3IONS-SCNC: 8 MMOL/L (ref 3–16)
BASOPHILS # BLD: 0 K/UL (ref 0–0.2)
BASOPHILS NFR BLD: 0.2 %
BUN SERPL-MCNC: 33 MG/DL (ref 7–20)
BUN SERPL-MCNC: 34 MG/DL (ref 7–20)
BUN SERPL-MCNC: 35 MG/DL (ref 7–20)
CALCIUM SERPL-MCNC: 8 MG/DL (ref 8.3–10.6)
CALCIUM SERPL-MCNC: 8.1 MG/DL (ref 8.3–10.6)
CALCIUM SERPL-MCNC: 8.2 MG/DL (ref 8.3–10.6)
CHLORIDE SERPL-SCNC: 102 MMOL/L (ref 99–110)
CHLORIDE SERPL-SCNC: 103 MMOL/L (ref 99–110)
CHLORIDE SERPL-SCNC: 105 MMOL/L (ref 99–110)
CO2 SERPL-SCNC: 30 MMOL/L (ref 21–32)
CO2 SERPL-SCNC: 32 MMOL/L (ref 21–32)
CO2 SERPL-SCNC: 33 MMOL/L (ref 21–32)
CREAT SERPL-MCNC: <0.5 MG/DL (ref 0.6–1.2)
DEPRECATED RDW RBC AUTO: 13 % (ref 12.4–15.4)
EOSINOPHIL # BLD: 0 K/UL (ref 0–0.6)
EOSINOPHIL NFR BLD: 0 %
GFR SERPLBLD CREATININE-BSD FMLA CKD-EPI: >60 ML/MIN/{1.73_M2}
GLUCOSE SERPL-MCNC: 140 MG/DL (ref 70–99)
GLUCOSE SERPL-MCNC: 141 MG/DL (ref 70–99)
GLUCOSE SERPL-MCNC: 143 MG/DL (ref 70–99)
HCT VFR BLD AUTO: 30.4 % (ref 36–48)
HGB BLD-MCNC: 10.7 G/DL (ref 12–16)
LYMPHOCYTES # BLD: 0.5 K/UL (ref 1–5.1)
LYMPHOCYTES NFR BLD: 5.7 %
MAGNESIUM SERPL-MCNC: 1.9 MG/DL (ref 1.8–2.4)
MAGNESIUM SERPL-MCNC: 1.9 MG/DL (ref 1.8–2.4)
MAGNESIUM SERPL-MCNC: 2.1 MG/DL (ref 1.8–2.4)
MCH RBC QN AUTO: 32 PG (ref 26–34)
MCHC RBC AUTO-ENTMCNC: 35.1 G/DL (ref 31–36)
MCV RBC AUTO: 91.3 FL (ref 80–100)
MONOCYTES # BLD: 0.3 K/UL (ref 0–1.3)
MONOCYTES NFR BLD: 3.5 %
NEUTROPHILS # BLD: 7.7 K/UL (ref 1.7–7.7)
NEUTROPHILS NFR BLD: 90.6 %
PHOSPHATE SERPL-MCNC: 2.1 MG/DL (ref 2.5–4.9)
PHOSPHATE SERPL-MCNC: 2.2 MG/DL (ref 2.5–4.9)
PHOSPHATE SERPL-MCNC: 2.8 MG/DL (ref 2.5–4.9)
PLATELET # BLD AUTO: 187 K/UL (ref 135–450)
PMV BLD AUTO: 10.2 FL (ref 5–10.5)
POTASSIUM SERPL-SCNC: 3.8 MMOL/L (ref 3.5–5.1)
POTASSIUM SERPL-SCNC: 3.9 MMOL/L (ref 3.5–5.1)
POTASSIUM SERPL-SCNC: 4.8 MMOL/L (ref 3.5–5.1)
RBC # BLD AUTO: 3.32 M/UL (ref 4–5.2)
SODIUM SERPL-SCNC: 141 MMOL/L (ref 136–145)
SODIUM SERPL-SCNC: 141 MMOL/L (ref 136–145)
SODIUM SERPL-SCNC: 142 MMOL/L (ref 136–145)
WBC # BLD AUTO: 8.5 K/UL (ref 4–11)

## 2024-02-05 PROCEDURE — 85025 COMPLETE CBC W/AUTO DIFF WBC: CPT

## 2024-02-05 PROCEDURE — 2580000003 HC RX 258

## 2024-02-05 PROCEDURE — 80069 RENAL FUNCTION PANEL: CPT

## 2024-02-05 PROCEDURE — 99233 SBSQ HOSP IP/OBS HIGH 50: CPT | Performed by: NURSE PRACTITIONER

## 2024-02-05 PROCEDURE — 99232 SBSQ HOSP IP/OBS MODERATE 35: CPT | Performed by: INTERNAL MEDICINE

## 2024-02-05 PROCEDURE — 36415 COLL VENOUS BLD VENIPUNCTURE: CPT

## 2024-02-05 PROCEDURE — 83735 ASSAY OF MAGNESIUM: CPT

## 2024-02-05 PROCEDURE — 6370000000 HC RX 637 (ALT 250 FOR IP)

## 2024-02-05 PROCEDURE — 94640 AIRWAY INHALATION TREATMENT: CPT

## 2024-02-05 PROCEDURE — C9113 INJ PANTOPRAZOLE SODIUM, VIA: HCPCS

## 2024-02-05 PROCEDURE — 2700000000 HC OXYGEN THERAPY PER DAY

## 2024-02-05 PROCEDURE — 6360000002 HC RX W HCPCS

## 2024-02-05 PROCEDURE — 6360000002 HC RX W HCPCS: Performed by: INTERNAL MEDICINE

## 2024-02-05 PROCEDURE — A4216 STERILE WATER/SALINE, 10 ML: HCPCS

## 2024-02-05 PROCEDURE — 74018 RADEX ABDOMEN 1 VIEW: CPT

## 2024-02-05 PROCEDURE — 2060000000 HC ICU INTERMEDIATE R&B

## 2024-02-05 PROCEDURE — 2500000003 HC RX 250 WO HCPCS

## 2024-02-05 PROCEDURE — 94761 N-INVAS EAR/PLS OXIMETRY MLT: CPT

## 2024-02-05 RX ORDER — DOXYCYCLINE 100 MG/1
100 CAPSULE ORAL EVERY 12 HOURS SCHEDULED
Status: DISCONTINUED | OUTPATIENT
Start: 2024-02-05 | End: 2024-02-06

## 2024-02-05 RX ORDER — PREDNISONE 20 MG/1
20 TABLET ORAL DAILY
Status: COMPLETED | OUTPATIENT
Start: 2024-02-06 | End: 2024-02-08

## 2024-02-05 RX ORDER — PREDNISONE 5 MG/1
5 TABLET ORAL DAILY
Status: DISCONTINUED | OUTPATIENT
Start: 2024-02-15 | End: 2024-02-08 | Stop reason: HOSPADM

## 2024-02-05 RX ORDER — PREDNISONE 10 MG/1
10 TABLET ORAL DAILY
Status: DISCONTINUED | OUTPATIENT
Start: 2024-02-11 | End: 2024-02-05

## 2024-02-05 RX ORDER — PREDNISONE 20 MG/1
20 TABLET ORAL DAILY
Status: DISCONTINUED | OUTPATIENT
Start: 2024-02-05 | End: 2024-02-05

## 2024-02-05 RX ORDER — DOXYCYCLINE 100 MG/1
100 CAPSULE ORAL EVERY 12 HOURS SCHEDULED
Status: DISCONTINUED | OUTPATIENT
Start: 2024-02-05 | End: 2024-02-05

## 2024-02-05 RX ORDER — PREDNISONE 5 MG/1
5 TABLET ORAL DAILY
Status: DISCONTINUED | OUTPATIENT
Start: 2024-02-14 | End: 2024-02-05

## 2024-02-05 RX ORDER — PREDNISONE 10 MG/1
10 TABLET ORAL DAILY
Status: DISCONTINUED | OUTPATIENT
Start: 2024-02-12 | End: 2024-02-08 | Stop reason: HOSPADM

## 2024-02-05 RX ADMIN — LEVALBUTEROL HYDROCHLORIDE 0.63 MG: 0.63 SOLUTION RESPIRATORY (INHALATION) at 08:49

## 2024-02-05 RX ADMIN — Medication: at 09:28

## 2024-02-05 RX ADMIN — BUDESONIDE 250 MCG: 0.25 INHALANT RESPIRATORY (INHALATION) at 08:49

## 2024-02-05 RX ADMIN — LEVALBUTEROL HYDROCHLORIDE 0.63 MG: 0.63 SOLUTION RESPIRATORY (INHALATION) at 16:32

## 2024-02-05 RX ADMIN — APIXABAN 5 MG: 5 TABLET, FILM COATED ORAL at 20:54

## 2024-02-05 RX ADMIN — MEROPENEM 1000 MG: 1 INJECTION INTRAVENOUS at 21:14

## 2024-02-05 RX ADMIN — SODIUM CHLORIDE, PRESERVATIVE FREE 10 ML: 5 INJECTION INTRAVENOUS at 20:54

## 2024-02-05 RX ADMIN — METHYLPREDNISOLONE SODIUM SUCCINATE 40 MG: 40 INJECTION, POWDER, LYOPHILIZED, FOR SOLUTION INTRAMUSCULAR; INTRAVENOUS at 04:37

## 2024-02-05 RX ADMIN — IPRATROPIUM BROMIDE 0.5 MG: 0.5 SOLUTION RESPIRATORY (INHALATION) at 08:50

## 2024-02-05 RX ADMIN — MEROPENEM 1000 MG: 1 INJECTION INTRAVENOUS at 12:43

## 2024-02-05 RX ADMIN — SODIUM CHLORIDE, PRESERVATIVE FREE 40 MG: 5 INJECTION INTRAVENOUS at 21:09

## 2024-02-05 RX ADMIN — MEROPENEM 1000 MG: 1 INJECTION INTRAVENOUS at 04:45

## 2024-02-05 RX ADMIN — IPRATROPIUM BROMIDE 0.5 MG: 0.5 SOLUTION RESPIRATORY (INHALATION) at 11:40

## 2024-02-05 RX ADMIN — HYDROXYZINE HYDROCHLORIDE 10 MG: 10 TABLET, FILM COATED ORAL at 09:28

## 2024-02-05 RX ADMIN — DOXYCYCLINE 100 MG: 100 CAPSULE ORAL at 21:10

## 2024-02-05 RX ADMIN — POTASSIUM PHOSPHATE 15 MMOL: 236; 224 INJECTION, SOLUTION INTRAVENOUS at 08:25

## 2024-02-05 RX ADMIN — LEVALBUTEROL HYDROCHLORIDE 0.63 MG: 0.63 SOLUTION RESPIRATORY (INHALATION) at 11:40

## 2024-02-05 RX ADMIN — APIXABAN 5 MG: 5 TABLET, FILM COATED ORAL at 09:25

## 2024-02-05 RX ADMIN — FOLIC ACID 5 MG: 1 TABLET ORAL at 09:24

## 2024-02-05 RX ADMIN — METOPROLOL TARTRATE 25 MG: 25 TABLET, FILM COATED ORAL at 20:54

## 2024-02-05 RX ADMIN — Medication: at 21:09

## 2024-02-05 RX ADMIN — SODIUM CHLORIDE, PRESERVATIVE FREE 40 MG: 5 INJECTION INTRAVENOUS at 09:25

## 2024-02-05 RX ADMIN — BUDESONIDE 250 MCG: 0.25 INHALANT RESPIRATORY (INHALATION) at 22:23

## 2024-02-05 RX ADMIN — IPRATROPIUM BROMIDE 0.5 MG: 0.5 SOLUTION RESPIRATORY (INHALATION) at 16:31

## 2024-02-05 RX ADMIN — THIAMINE HYDROCHLORIDE 100 MG: 100 INJECTION, SOLUTION INTRAMUSCULAR; INTRAVENOUS at 09:25

## 2024-02-05 RX ADMIN — IPRATROPIUM BROMIDE 0.5 MG: 0.5 SOLUTION RESPIRATORY (INHALATION) at 22:23

## 2024-02-05 RX ADMIN — METHYLPREDNISOLONE SODIUM SUCCINATE 40 MG: 40 INJECTION, POWDER, LYOPHILIZED, FOR SOLUTION INTRAMUSCULAR; INTRAVENOUS at 09:25

## 2024-02-05 RX ADMIN — LEVALBUTEROL HYDROCHLORIDE 0.63 MG: 0.63 SOLUTION RESPIRATORY (INHALATION) at 22:23

## 2024-02-05 RX ADMIN — THERA TABS 1 TABLET: TAB at 09:25

## 2024-02-05 RX ADMIN — SODIUM CHLORIDE 25 ML: 9 INJECTION, SOLUTION INTRAVENOUS at 04:44

## 2024-02-05 ASSESSMENT — PAIN SCALES - WONG BAKER
WONGBAKER_NUMERICALRESPONSE: 0

## 2024-02-05 NOTE — PROGRESS NOTES
PT tolerating tube feed. Found 0 residual upon pull back from NG. Advanced tube feed from 20ml/hr to 30ml/hr.

## 2024-02-05 NOTE — PROGRESS NOTES
New Corpak placed after patient pulled previous NG tube out of nose. Abdominal XRAY ordered to confirm tip placement. Results of XRAY listed below, interpreted by Dr. Ann. Feed restarted at 20ml/hr as previously running.     FINDINGS:     Portable AP semiupright view of the abdomen demonstrates Corpak with tip in the region of the body of the stomach. Nonobstructive bowel gas pattern in the upper abdomen.     IMPRESSION:     Corpak with tip in the body of the stomach.

## 2024-02-05 NOTE — CARE COORDINATION
PT unable to assess patient and patient refusing assessment as well as in wrist restraints today. Per nursing, patient remains confused and oriented to self only. This CM went to patient's room and daughter, Alanna is by bedside. Asked daughter if she spoke to Lan in finance and she stated that she had a brief discussion with Lan and told her that patient has never been interested in medicaid and will not do anything to convert her mother to medicaid. Spoke to daughter about possible SNF as placement and daughter states that a place around where they live is preferable. She is agreeable to finding geographic location close by and that takes insurance- no other preferences. CM will continue to follow patient until discharge. Electronically signed by Arlen Johansen RN on 2/5/2024 at 3:11 PM

## 2024-02-05 NOTE — PROGRESS NOTES
Speech pathology  Attempt    Chart reviewed, d/w RN.  Attempted bedside re-assessment to determine pt appropriateness for MBS. Pt declining trials with the exception of single sip of water. However pt RR noted to fluctuate between 30-37.    ST dept protocol is RR> 25 pt not safe for PO, d/t increased risk for aspiration.  RN states pt will be given medication, d/t concern RR high d/t  possible anxiety.  Will follow up as pt able and schedule allows.      ABE SANDOVAL M.S./CCC-SLP #[id number]  Pg. # [phone]      Addendum:  D/w RN Theodora who states RR decreased earlier, however  increased again.  Pt still with reduced participation, therefore would not be able to complete MBS until accepting of PO. Will recheck next session to determine ability to participate in instrumental exam      ABE SANDOVAL M.S./CCC-SLP #[id number]  Pg. # [phone]

## 2024-02-05 NOTE — CARE COORDINATION
SNFs close by patient's daughter are the following: [facility], [facility], [facility], [facility], [facility]. Patient must have PT/OT evals before being reviewed by nursing facilities. CM will continue to follow patient until discharge. Electronically signed by Arlen Johansen RN on 2/5/2024 at 3:18 PM

## 2024-02-05 NOTE — PLAN OF CARE
Problem: Safety - Medical Restraint  Goal: Remains free of injury from restraints (Restraint for Interference with Medical Device)  Description: INTERVENTIONS:  1. Determine that other, less restrictive measures have been tried or would not be effective before applying the restraint  2. Evaluate the patient's condition at the time of restraint application  3. Inform patient/family regarding the reason for restraint  4. Q2H: Monitor safety, psychosocial status, comfort, nutrition and hydration  Outcome: Not Progressing  Flowsheets (Taken 2/3/2024 1813 by Argenis Finnegan, RN)  Remains free of injury from restraints (restraint for interference with medical device):   Determine that other, less restrictive measures have been tried or would not be effective before applying the restraint   Evaluate the patient's condition at the time of restraint application   Every 2 hours: Monitor safety, psychosocial status, comfort, nutrition and hydration   Inform patient/family regarding the reason for restraint  Note: Pt remains confused and oriented to self only     Problem: Discharge Planning  Goal: Discharge to home or other facility with appropriate resources  Outcome: Progressing  Flowsheets (Taken 2/3/2024 1813 by Argenis Finnegan, RN)  Discharge to home or other facility with appropriate resources:   Identify barriers to discharge with patient and caregiver   Identify discharge learning needs (meds, wound care, etc)     Problem: Pain  Goal: Verbalizes/displays adequate comfort level or baseline comfort level  Outcome: Progressing  Flowsheets (Taken 2/4/2024 0834 by Argenis Finnegan, RN)  Verbalizes/displays adequate comfort level or baseline comfort level:   Encourage patient to monitor pain and request assistance   Assess pain using appropriate pain scale   Administer analgesics based on type and severity of pain and evaluate response   Implement non-pharmacological measures as appropriate and evaluate response     Problem: Safety -  Adult  Goal: Free from fall injury  Outcome: Progressing  Flowsheets (Taken 2/3/2024 1813 by Argenis Finnegan, RN)  Free From Fall Injury:   Instruct family/caregiver on patient safety   Based on caregiver fall risk screen, instruct family/caregiver to ask for assistance with transferring infant if caregiver noted to have fall risk factors     Problem: Neurosensory - Adult  Goal: Achieves stable or improved neurological status  Outcome: Progressing  Flowsheets (Taken 1/30/2024 1259 by Jody Silva, RN)  Achieves stable or improved neurological status:   Assess for and report changes in neurological status   Initiate measures to prevent increased intracranial pressure   Maintain blood pressure and fluid volume within ordered parameters to optimize cerebral perfusion and minimize risk of hemorrhage     Problem: Cardiovascular - Adult  Goal: Maintains optimal cardiac output and hemodynamic stability  Outcome: Progressing  Flowsheets (Taken 2/1/2024 1802 by Gloria Munguia, RN)  Maintains optimal cardiac output and hemodynamic stability:   Monitor blood pressure and heart rate   Monitor urine output and notify Licensed Independent Practitioner for values outside of normal range   Assess for signs of decreased cardiac output     Problem: Respiratory - Adult  Goal: Achieves optimal ventilation and oxygenation  Outcome: Progressing  Flowsheets (Taken 2/1/2024 1802 by Gloria Munguia, RN)  Achieves optimal ventilation and oxygenation:   Assess for changes in respiratory status   Assess for changes in mentation and behavior   Position to facilitate oxygenation and minimize respiratory effort   Oxygen supplementation based on oxygen saturation or arterial blood gases   Initiate smoking cessation protocol as indicated   Encourage broncho-pulmonary hygiene including cough, deep breathe, incentive spirometry   Assess and instruct to report shortness of breath or any respiratory difficulty   Respiratory therapy support as indicated

## 2024-02-05 NOTE — PROGRESS NOTES
[facility]   Cardiology  Note   Pt of Dr Bobby PARISH, FACC   Yamileth Dorsey RN, FNP APRN CVNP  Date: 2/5/2024  Admit Date: 1/28/2024       CC:AMS, fatigue     Following cardiology today for: PAFRVR with remote tobacco use   COPD not on oxygen, IBS, HTN, HLP, DM, on chronic benzodiazepines and opioids.     Interval Hx /  Subjective:Today, she is resting in bed, eyes open, no verbal response VSS in NSR / on 02 nasal 02 SV02  95%   Visitor at bedside    No major events overnight.     Medical Decision Making:  Discussion of patient care with other providers  Patient seen and examined. Clinical notes reviewed. Telemetry reviewed / Pertinent labs, diagnostic, device, and imaging results reviewed as a part of this visit  I spent a total of 50 minutes and greater than 50% of the time was spent counseling with patient  coordinating care regarding her diagnosis, treatments and plan of care      Scheduled Meds:   [START ON 2/6/2024] predniSONE  20 mg Per NG tube Daily    Followed by    [START ON 2/9/2024] predniSONE  15 mg Per NG tube Daily    Followed by    [START ON 2/12/2024] predniSONE  10 mg Per NG tube Daily    Followed by    [START ON 2/15/2024] predniSONE  5 mg Per NG tube Daily    metoprolol tartrate  25 mg Oral BID    meropenem  1,000 mg IntraVENous Q8H    thiamine  100 mg IntraVENous Daily    levalbuterol  0.63 mg Nebulization 4x Daily RT    [Held by provider] metoprolol  5 mg IntraVENous Q6H    apixaban  5 mg Oral BID    multivitamin  1 tablet Oral Daily    folic acid  5 mg Oral Daily    balsum peru-castor oil   Topical BID    budesonide  0.25 mg Nebulization BID RT    sodium chloride flush  5-40 mL IntraVENous 2 times per day    ipratropium  0.5 mg Nebulization 4x Daily RT    pantoprazole (PROTONIX) 40 mg in sodium chloride (PF) 0.9 % 10 mL injection  40 mg IntraVENous BID     Vitals:    02/05/24 1244   BP:    Pulse:    Resp:    Temp:    SpO2: 95%      In: 1034 [NG/GT:1034]  Out: 1625    Wt Readings from

## 2024-02-05 NOTE — PROGRESS NOTES
Physical Therapy  Zuleima Clarke    Chart reviewed.  RN approval obtained.  PT attempted to see patient for follow up treatment.  Daughter present at bedside.  Patient making eye contact with PT, able to acknowledge PT, hard of hearing but responsive with elevated vocalization and slow verbalization.  Patient declining all attempts at PT intervention, \"sit up? No.\"  Discussed with daughter.  Plan to continue to promote mobility as able.  Patient in (B) wrist restraints upon arrival - maintained throughout attempt.  Plan to continue to follow per plan of care.  Discussed with RN.    Maxine Vigil PT, DPT [id number]

## 2024-02-05 NOTE — PROGRESS NOTES
Comprehensive Nutrition Assessment    RECOMMENDATIONS:  TF:  Advance to 20 mL/hr. RD will reassess on 2/6 and advance TF as appropriate  PO Diet: Continue FLD  ONS: Continue Ensure TID  Nutrition Education: Education not appropriate     NUTRITION ASSESSMENT:   Nutritional summary & status: Follow up: Pt continues on TF, had originally started @ trophic rate (10 mL/hr), TF had been advanced to 30 mL/hr and pt is exhibiting signs of refeeding. Discussed w/ RN, rate dropped back to 20 mL/hr. Will continue at this rate and assess labs in 24 hours to determine ability to advance to goal. Pt was advanced to FLD per SLP, however 0% of all meals recorded. Refeeding labs in place, electrolytes being replaced. Having BMs, no s/s of formula intolerance.   Admission // PMH: Pneumonia/Sepsis // IBS, HTN, HLD, DM2, Afib    MALNUTRITION ASSESSMENT  Context of Malnutrition: Acute Illness   Malnutrition Status: Severe malnutrition  Findings of the 6 clinical characteristics of malnutrition (Minimum of 2 out of 6 clinical characteristics is required to make the diagnosis of moderate or severe Protein Calorie Malnutrition based on AND/ASPEN Guidelines):  Energy Intake:  Mild decrease in energy intake (Comment)  Weight Loss:  No significant weight loss     Body Fat Loss:  Moderate body fat loss Orbital, Triceps, Fat Overlying Ribs   Muscle Mass Loss:  Moderate muscle mass loss Temples (temporalis)  Fluid Accumulation:  No significant fluid accumulation      NUTRITION DIAGNOSIS   Inadequate oral intake related to cognitive or neurological impairment as evidenced by NPO or clear liquid status due to medical condition    Nutrition Monitoring and Evaluation:   Food/Nutrient Intake Outcomes:  Food and Nutrient Intake, Supplement Intake, Enteral Nutrition Intake/Tolerance  Physical Signs/Symptoms Outcomes:  Biochemical Data, Nutrition Focused Physical Findings, Weight, Hemodynamic Status     OBJECTIVE DATA: Significant to nutrition

## 2024-02-05 NOTE — PROGRESS NOTES
(!) 116/49 98.9 °F (37.2 °C) CORE 84 (!) 31 96 % 44.3 kg (97 lb 10.6 oz)         Intake/Output Summary (Last 24 hours) at 2/5/2024 1032  Last data filed at 2/5/2024 1000  Gross per 24 hour   Intake 898 ml   Output 1075 ml   Net -177 ml         Review of Systems  Unable to do    Physical Exam  Constitutional:       Comments: Alert, responds appropriately to questions  HENT:      Head: Normocephalic and atraumatic.      Nose: Nose normal.      Mouth/Throat:      Mouth: Mucous membranes are moist.   Eyes:      Extraocular Movements: Extraocular movements intact.      Conjunctiva/sclera: Conjunctivae normal.      Pupils: Pupils are equal, round, and reactive to light.   Cardiovascular:      Right-side jugular venous distension present but improved     Rate and Rhythm: Regular rate and rhythm.      Pulses: Normal pulses.      Heart sounds: Normal heart sounds.   Pulmonary:      Effort: Pulmonary effort is normal. No respiratory distress.      Breath sounds: No wheezing or rhonchi.      Comments: No crackles appreciated this AM.  Abdominal:      General: There is no distension.      Palpations: Abdomen is soft.      Tenderness: There is no abdominal tenderness. There is no guarding or rebound.   Musculoskeletal:         General: No swelling, deformity or signs of injury. Normal range of motion.      Cervical back: Normal range of motion and neck supple.   Skin:     General: Skin is warm.      Capillary Refill: Capillary refill takes less than 2 seconds.      Coloration: Skin is not jaundiced.      Findings: No bruising or erythema.   Neurological:      Comments: Improving alertness    LABS:    CBC:   Recent Labs     02/03/24  0654 02/04/24  0506 02/05/24  0617   WBC 12.8* 10.3 8.5   HGB 11.1* 10.3* 10.7*   HCT 33.2* 30.4* 30.4*    155 187   MCV 93.4 93.3 91.3       Renal:    Recent Labs     02/04/24  1530 02/04/24  2322 02/05/24  0617    141 142   K 4.1 3.8 3.9    102 105   CO2 32 33* 32   BUN 36* 35*  tube feeds. Dietitian consulted. Monitoring RFP for refeeding     ##New-onset AFIB, rhythm-controlled  Daughter mentioned she never had afib before nor does she have any records of it per chart review. HR at 180s and she was started on dilt which reduced her HR to ~140-160s however did contribute to her hypotension. She responded well to septic bolus as well as 1L LR bolus. Was given digoxin 500 mcg IV at the ED as well. Briefly reverted back to afib during admission but back in NSR after amio bolus.   - Cardiology consulted; appreciate recs  - amio gtt stopped, on lopressor PO  - Echo ordered; EF 55-60%. Aortic stenosis present  - coprak placed successfully - on Eliquis     ## Dark stools, resolved  Pt with recent worsening of abdominal pain. Noted to have several episodes of dark stool during admission  - daily CBC  - GI consulted; appreciate recs   - No intervention at this time, can consider EGD if Hgb drops significantly but has remained stable.    ## Dilated bile ducts/prominent pancreatic duct  Incidentally found on CT. Liver enzymes significant for elevated AST but otherwise normal. No abdominal tenderness appreciated  - GI consulted; appreciate recs  - Liver enzymes normalized    #NYA, resolved  Likely due to renal hypoperfusion with component of cardiogenic shock vs decreased PO intake  - Resolved with fluid resuscitation and NSR      DVT PPx: Eliquis  Diet: Full liquid diet, tube feeding  Code status:  Limited x4  ELOS: >2 days  Barriers to discharge: severe sepsis,  Disposition  - Preadmission: Home  - Current: IP  - Upon discharge: Home    Richard Tamayo MD, PGY-1  02/05/24  10:32 AM    Patient seen and examined, labs and imaging studies reviewed, agree with assessment and plan as outlined above.  Continue with current care and plan.  Discussed case with patients nurse, discussed case with care team, discussed plan.      Candelario Awad MD FACP

## 2024-02-05 NOTE — PROGRESS NOTES
Tube feed rate increased to 20ml/hr per order.   Electronically signed by Argenis Finnegan RN on 2/4/2024 at 7:22 PM

## 2024-02-05 NOTE — PROGRESS NOTES
Pulmonary Followup Note    CC: PNA, respiratory failure  Subjective:  Weaned down to 1 L of oxygen.  Getting a breathing treatment for my evaluation.  She would not communicate with me, just groaned.  Her daughter was at bedside and said she been pretty tired.  She had a chest x-ray yesterday that showed possible cavitary lesion this was followed by a CT scan.    ROS: Patient unable to vocalize.     24HR INTAKE/OUTPUT:    Intake/Output Summary (Last 24 hours) at 2024 1727  Last data filed at 2024 1600  Gross per 24 hour   Intake 999 ml   Output 1125 ml   Net -126 ml          [START ON 2024] predniSONE  20 mg Per NG tube Daily    Followed by    [START ON 2024] predniSONE  15 mg Per NG tube Daily    Followed by    [START ON 2024] predniSONE  10 mg Per NG tube Daily    Followed by    [START ON 2/15/2024] predniSONE  5 mg Per NG tube Daily    doxycycline monohydrate  100 mg Oral 2 times per day    metoprolol tartrate  25 mg Oral BID    meropenem  1,000 mg IntraVENous Q8H    thiamine  100 mg IntraVENous Daily    levalbuterol  0.63 mg Nebulization 4x Daily RT    [Held by provider] metoprolol  5 mg IntraVENous Q6H    apixaban  5 mg Oral BID    multivitamin  1 tablet Oral Daily    folic acid  5 mg Oral Daily    balsum peru-castor oil   Topical BID    budesonide  0.25 mg Nebulization BID RT    sodium chloride flush  5-40 mL IntraVENous 2 times per day    ipratropium  0.5 mg Nebulization 4x Daily RT    pantoprazole (PROTONIX) 40 mg in sodium chloride (PF) 0.9 % 10 mL injection  40 mg IntraVENous BID           PHYSICAL EXAMINATION:  BP (!) 117/51   Pulse 86   Temp 99 °F (37.2 °C) (Bladder)   Resp 29   Ht 1.575 m (5' 2\")   Wt 44.3 kg (97 lb 10.6 oz)   SpO2 93%   BMI 17.86 kg/m²   CURRENT PULSE OXIMETRY:  SpO2: 93 %  24HR PULSE OXIMETRY RANGE:  SpO2  Av.6 %  Min: 93 %  Max: 100 %       Gen: Mild distress, sick look overall.  HEENT: PERRL, EOMI, OP nl  Lung:

## 2024-02-05 NOTE — PLAN OF CARE
Problem: Safety - Medical Restraint  Goal: Remains free of injury from restraints (Restraint for Interference with Medical Device)  Description: INTERVENTIONS:  1. Determine that other, less restrictive measures have been tried or would not be effective before applying the restraint  2. Evaluate the patient's condition at the time of restraint application  3. Inform patient/family regarding the reason for restraint  4. Q2H: Monitor safety, psychosocial status, comfort, nutrition and hydration  2/5/2024 1220 by Theodora Gonzales RN  Outcome: Not Progressing  Flowsheets (Taken 2/5/2024 1220 by Theodora Gonzales RN)  Remains free of injury from restraints (restraint for interference with medical device):   Determine that other, less restrictive measures have been tried or would not be effective before applying the restraint   Evaluate the patient's condition at the time of restraint application   Every 2 hours: Monitor safety, psychosocial status, comfort, nutrition and hydration   Inform patient/family regarding the reason for restraint  Note: Pt remains confused and oriented to self only     Problem: Discharge Planning  Goal: Discharge to home or other facility with appropriate resources  2/5/2024 1220 by Theodora Gonzales RN  Outcome: Progressing  Flowsheets (Taken 2/5/2024 1220)  Discharge to home or other facility with appropriate resources:   Identify barriers to discharge with patient and caregiver   Arrange for needed discharge resources and transportation as appropriate   Identify discharge learning needs (meds, wound care, etc)     Problem: Pain  Goal: Verbalizes/displays adequate comfort level or baseline comfort level  2/5/2024 1220 by Theodora Gonzales RN  Outcome: Progressing  Flowsheets (Taken 2/5/2024 1220)  Verbalizes/displays adequate comfort level or baseline comfort level:   Encourage patient to monitor pain and request assistance   Assess pain using appropriate pain scale   Administer analgesics based on type and  severity of pain and evaluate response   Implement non-pharmacological measures as appropriate and evaluate response  Note: Patient denies pain this shift, will continue to monitor patient pain levels and apply intervention as needed.     Problem: Safety - Adult  Goal: Free from fall injury  2/5/2024 1220 by Theodora Gonzales RN  Outcome: Progressing  Flowsheets (Taken 2/5/2024 1220)  Free From Fall Injury: Instruct family/caregiver on patient safety  Note: Pt will remain free from accidental injury this shift. Pt has fall risk measures (fall risk bracelet, fall risk sign, fall risk cloth, non-slip socks, dome light on) in place. Pt bed is in low position, bed alarm on, bed wheels locked, call light within reach, bedside table within reach, chair wheels locked, chair alarm on.      Problem: Cardiovascular - Adult  Goal: Maintains optimal cardiac output and hemodynamic stability  2/5/2024 1220 by Theodora Gonzales RN  Outcome: Progressing  Note: Patient is on continuous telemetry and cardiac rhythm is normal sinus at this time.      Problem: Respiratory - Adult  Goal: Achieves optimal ventilation and oxygenation  2/5/2024 1220 by Theodora Gonzales RN  Outcome: Progressing  Flowsheets (Taken 2/5/2024 1220)  Achieves optimal ventilation and oxygenation:   Assess for changes in respiratory status   Assess for changes in mentation and behavior   Position to facilitate oxygenation and minimize respiratory effort   Oxygen supplementation based on oxygen saturation or arterial blood gases  Note: Patient has been weaned down to 4L at this point during shift. SpO2 is WNL and will continue to wean patient as tolerated back to baseline of RA      Problem: Skin/Tissue Integrity  Goal: Absence of new skin breakdown  Description: 1.  Monitor for areas of redness and/or skin breakdown  2.  Assess vascular access sites hourly  3.  Every 4-6 hours minimum:  Change oxygen saturation probe site  4.  Every 4-6 hours:  If on nasal continuous positive

## 2024-02-06 ENCOUNTER — APPOINTMENT (OUTPATIENT)
Dept: GENERAL RADIOLOGY | Age: 84
DRG: 871 | End: 2024-02-06
Payer: MEDICARE

## 2024-02-06 LAB
ALBUMIN SERPL-MCNC: 2.1 G/DL (ref 3.4–5)
ALBUMIN SERPL-MCNC: 2.1 G/DL (ref 3.4–5)
ANION GAP SERPL CALCULATED.3IONS-SCNC: 5 MMOL/L (ref 3–16)
ANION GAP SERPL CALCULATED.3IONS-SCNC: 6 MMOL/L (ref 3–16)
BASOPHILS # BLD: 0 K/UL (ref 0–0.2)
BASOPHILS NFR BLD: 0.2 %
BUN SERPL-MCNC: 25 MG/DL (ref 7–20)
BUN SERPL-MCNC: 27 MG/DL (ref 7–20)
CALCIUM SERPL-MCNC: 8.1 MG/DL (ref 8.3–10.6)
CALCIUM SERPL-MCNC: 8.1 MG/DL (ref 8.3–10.6)
CHLORIDE SERPL-SCNC: 103 MMOL/L (ref 99–110)
CHLORIDE SERPL-SCNC: 104 MMOL/L (ref 99–110)
CO2 SERPL-SCNC: 30 MMOL/L (ref 21–32)
CO2 SERPL-SCNC: 30 MMOL/L (ref 21–32)
CREAT SERPL-MCNC: <0.5 MG/DL (ref 0.6–1.2)
CREAT SERPL-MCNC: <0.5 MG/DL (ref 0.6–1.2)
DEPRECATED RDW RBC AUTO: 13.4 % (ref 12.4–15.4)
EOSINOPHIL # BLD: 0 K/UL (ref 0–0.6)
EOSINOPHIL NFR BLD: 0.1 %
GFR SERPLBLD CREATININE-BSD FMLA CKD-EPI: >60 ML/MIN/{1.73_M2}
GFR SERPLBLD CREATININE-BSD FMLA CKD-EPI: >60 ML/MIN/{1.73_M2}
GLUCOSE SERPL-MCNC: 122 MG/DL (ref 70–99)
GLUCOSE SERPL-MCNC: 98 MG/DL (ref 70–99)
HCT VFR BLD AUTO: 31.6 % (ref 36–48)
HGB BLD-MCNC: 10.9 G/DL (ref 12–16)
LYMPHOCYTES # BLD: 0.8 K/UL (ref 1–5.1)
LYMPHOCYTES NFR BLD: 8.5 %
MAGNESIUM SERPL-MCNC: 2.1 MG/DL (ref 1.8–2.4)
MAGNESIUM SERPL-MCNC: 2.1 MG/DL (ref 1.8–2.4)
MCH RBC QN AUTO: 31.7 PG (ref 26–34)
MCHC RBC AUTO-ENTMCNC: 34.5 G/DL (ref 31–36)
MCV RBC AUTO: 91.7 FL (ref 80–100)
MONOCYTES # BLD: 0.5 K/UL (ref 0–1.3)
MONOCYTES NFR BLD: 5.1 %
NEUTROPHILS # BLD: 7.6 K/UL (ref 1.7–7.7)
NEUTROPHILS NFR BLD: 86.1 %
PHOSPHATE SERPL-MCNC: 2.4 MG/DL (ref 2.5–4.9)
PHOSPHATE SERPL-MCNC: 2.5 MG/DL (ref 2.5–4.9)
PLATELET # BLD AUTO: 208 K/UL (ref 135–450)
PMV BLD AUTO: 9.8 FL (ref 5–10.5)
POTASSIUM SERPL-SCNC: 3.9 MMOL/L (ref 3.5–5.1)
POTASSIUM SERPL-SCNC: 4 MMOL/L (ref 3.5–5.1)
RBC # BLD AUTO: 3.44 M/UL (ref 4–5.2)
SODIUM SERPL-SCNC: 139 MMOL/L (ref 136–145)
SODIUM SERPL-SCNC: 139 MMOL/L (ref 136–145)
WBC # BLD AUTO: 8.9 K/UL (ref 4–11)

## 2024-02-06 PROCEDURE — 2700000000 HC OXYGEN THERAPY PER DAY

## 2024-02-06 PROCEDURE — 94669 MECHANICAL CHEST WALL OSCILL: CPT

## 2024-02-06 PROCEDURE — 2580000003 HC RX 258

## 2024-02-06 PROCEDURE — 6370000000 HC RX 637 (ALT 250 FOR IP)

## 2024-02-06 PROCEDURE — A4216 STERILE WATER/SALINE, 10 ML: HCPCS

## 2024-02-06 PROCEDURE — 6360000002 HC RX W HCPCS

## 2024-02-06 PROCEDURE — 97530 THERAPEUTIC ACTIVITIES: CPT

## 2024-02-06 PROCEDURE — 94761 N-INVAS EAR/PLS OXIMETRY MLT: CPT

## 2024-02-06 PROCEDURE — 6360000002 HC RX W HCPCS: Performed by: INTERNAL MEDICINE

## 2024-02-06 PROCEDURE — C9113 INJ PANTOPRAZOLE SODIUM, VIA: HCPCS

## 2024-02-06 PROCEDURE — 83735 ASSAY OF MAGNESIUM: CPT

## 2024-02-06 PROCEDURE — 94640 AIRWAY INHALATION TREATMENT: CPT

## 2024-02-06 PROCEDURE — 80069 RENAL FUNCTION PANEL: CPT

## 2024-02-06 PROCEDURE — 6370000000 HC RX 637 (ALT 250 FOR IP): Performed by: INTERNAL MEDICINE

## 2024-02-06 PROCEDURE — 85025 COMPLETE CBC W/AUTO DIFF WBC: CPT

## 2024-02-06 PROCEDURE — 99233 SBSQ HOSP IP/OBS HIGH 50: CPT | Performed by: INTERNAL MEDICINE

## 2024-02-06 PROCEDURE — 74018 RADEX ABDOMEN 1 VIEW: CPT

## 2024-02-06 PROCEDURE — 2060000000 HC ICU INTERMEDIATE R&B

## 2024-02-06 PROCEDURE — 36415 COLL VENOUS BLD VENIPUNCTURE: CPT

## 2024-02-06 RX ORDER — BUDESONIDE 0.5 MG/2ML
INHALANT ORAL
Status: COMPLETED
Start: 2024-02-06 | End: 2024-02-06

## 2024-02-06 RX ORDER — LEVALBUTEROL 1.25 MG/.5ML
SOLUTION, CONCENTRATE RESPIRATORY (INHALATION)
Status: COMPLETED
Start: 2024-02-06 | End: 2024-02-06

## 2024-02-06 RX ORDER — AMIODARONE HYDROCHLORIDE 200 MG/1
200 TABLET ORAL DAILY
Status: DISCONTINUED | OUTPATIENT
Start: 2024-02-06 | End: 2024-02-08 | Stop reason: HOSPADM

## 2024-02-06 RX ADMIN — METOPROLOL TARTRATE 25 MG: 25 TABLET, FILM COATED ORAL at 21:08

## 2024-02-06 RX ADMIN — APIXABAN 5 MG: 5 TABLET, FILM COATED ORAL at 21:08

## 2024-02-06 RX ADMIN — BUDESONIDE INHALATION 500 MCG: 0.5 SUSPENSION RESPIRATORY (INHALATION) at 20:45

## 2024-02-06 RX ADMIN — LEVALBUTEROL HYDROCHLORIDE 0.63 MG: 0.63 SOLUTION RESPIRATORY (INHALATION) at 08:11

## 2024-02-06 RX ADMIN — BUDESONIDE 250 MCG: 0.25 INHALANT RESPIRATORY (INHALATION) at 20:46

## 2024-02-06 RX ADMIN — Medication: at 21:07

## 2024-02-06 RX ADMIN — THIAMINE HYDROCHLORIDE 100 MG: 100 INJECTION, SOLUTION INTRAMUSCULAR; INTRAVENOUS at 08:27

## 2024-02-06 RX ADMIN — THERA TABS 1 TABLET: TAB at 08:28

## 2024-02-06 RX ADMIN — LEVALBUTEROL 1.25 MG: 1.25 SOLUTION, CONCENTRATE RESPIRATORY (INHALATION) at 20:45

## 2024-02-06 RX ADMIN — IPRATROPIUM BROMIDE 0.5 MG: 0.5 SOLUTION RESPIRATORY (INHALATION) at 11:44

## 2024-02-06 RX ADMIN — APIXABAN 5 MG: 5 TABLET, FILM COATED ORAL at 08:28

## 2024-02-06 RX ADMIN — AMIODARONE HYDROCHLORIDE 200 MG: 200 TABLET ORAL at 15:40

## 2024-02-06 RX ADMIN — SODIUM CHLORIDE, PRESERVATIVE FREE 40 MG: 5 INJECTION INTRAVENOUS at 08:27

## 2024-02-06 RX ADMIN — SODIUM CHLORIDE 10 ML: 9 INJECTION INTRAMUSCULAR; INTRAVENOUS; SUBCUTANEOUS at 08:26

## 2024-02-06 RX ADMIN — SODIUM CHLORIDE, PRESERVATIVE FREE 40 MG: 5 INJECTION INTRAVENOUS at 21:08

## 2024-02-06 RX ADMIN — FOLIC ACID 5 MG: 1 TABLET ORAL at 08:28

## 2024-02-06 RX ADMIN — DOXYCYCLINE 100 MG: 100 CAPSULE ORAL at 08:28

## 2024-02-06 RX ADMIN — BUDESONIDE 250 MCG: 0.25 INHALANT RESPIRATORY (INHALATION) at 08:11

## 2024-02-06 RX ADMIN — SODIUM CHLORIDE, PRESERVATIVE FREE 10 ML: 5 INJECTION INTRAVENOUS at 21:07

## 2024-02-06 RX ADMIN — LEVALBUTEROL HYDROCHLORIDE 0.63 MG: 0.63 SOLUTION RESPIRATORY (INHALATION) at 11:44

## 2024-02-06 RX ADMIN — IPRATROPIUM BROMIDE 0.5 MG: 0.5 SOLUTION RESPIRATORY (INHALATION) at 20:45

## 2024-02-06 RX ADMIN — LEVALBUTEROL HYDROCHLORIDE 0.63 MG: 0.63 SOLUTION RESPIRATORY (INHALATION) at 20:47

## 2024-02-06 RX ADMIN — IPRATROPIUM BROMIDE 0.5 MG: 0.5 SOLUTION RESPIRATORY (INHALATION) at 15:55

## 2024-02-06 RX ADMIN — METOPROLOL TARTRATE 25 MG: 25 TABLET, FILM COATED ORAL at 08:28

## 2024-02-06 RX ADMIN — LEVALBUTEROL HYDROCHLORIDE 0.63 MG: 0.63 SOLUTION RESPIRATORY (INHALATION) at 15:55

## 2024-02-06 RX ADMIN — IPRATROPIUM BROMIDE 0.5 MG: 0.5 SOLUTION RESPIRATORY (INHALATION) at 08:11

## 2024-02-06 RX ADMIN — PREDNISONE 20 MG: 20 TABLET ORAL at 08:27

## 2024-02-06 ASSESSMENT — PAIN SCALES - PAIN ASSESSMENT IN ADVANCED DEMENTIA (PAINAD)
TOTALSCORE: 0
CONSOLABILITY: 0
TOTALSCORE: 0
NEGVOCALIZATION: 0
NEGVOCALIZATION: 0
BODYLANGUAGE: 0
FACIALEXPRESSION: 0
CONSOLABILITY: 0
BREATHING: 0
BREATHING: 0
BODYLANGUAGE: 0

## 2024-02-06 ASSESSMENT — PAIN SCALES - WONG BAKER
WONGBAKER_NUMERICALRESPONSE: 0

## 2024-02-06 ASSESSMENT — PAIN SCALES - GENERAL
PAINLEVEL_OUTOF10: 0
PAINLEVEL_OUTOF10: 0

## 2024-02-06 NOTE — CARE COORDINATION
Have list of nursing facilities for patient referrals, but cannot refer them until have PT/OT notes in with determination for placement SNF review to accept. Will need [employer] precert. CM will continue to follow patient until discharge.  Electronically signed by Arlen Johansen RN on 2/6/2024 at 1:59 PM

## 2024-02-06 NOTE — PROGRESS NOTES
Patient removed corpak tube feed from nose during the range of motion exercises while restraints were loosened. New tube to be placed per message with resident Dr. Parikh. Abdominal KUB to be obtained after placement of new corpak. Charge nurse notified of situation.     Electronically signed by Theodora Gonzales RN on 2/6/2024 at 5:12 PM

## 2024-02-06 NOTE — PROGRESS NOTES
providing prior level of function due to very hard of hearing       Objective Treatment included functional transfer training, ADL's and pt. education.      Safety Devices  Type of Devices: Left in bed;Bed alarm in place;Call light within reach;Nurse notified  Restraints  Restraints Initially in Place: Yes  Restraints: Replaced at bedside - Bilateral wrist restraints     Toilet Transfers  Toilet Transfer: Dependent/Total;2 Person assistance  Toilet Transfers Comments: simulated - not safe for sitting up 2/2 pt remains in bilateral wrist restraints  AROM: Generally decreased, functional (poor assessment 2/2 inability to participate)  Strength: Generally decreased, functional  Coordination: Generally decreased, functional  Tone: Normal  Sensation: Intact  ADL  Feeding: NPO  Feeding Skilled Clinical Factors: Core pack in place  Grooming: Dependent/Total  Grooming Skilled Clinical Factors: pt not able to engage 2/2 disinterest this date. dep with washing face / combing hair  LE Dressing: Dependent/Total  LE Dressing Skilled Clinical Factors: with socks - max cues to attempt to lift legs  Toileting: Dependent/Total  Toileting Skilled Clinical Factors: abrams in place  Functional Mobility: Unable to assess(comment)  Additional Comments: Pt sat at EOB x 10 Min + with Max to CGA ( brief 10 sec x 3). Pt with strong push / pull to all planes except anterior.  Pt resistive to movement at EOB.  Skin Care: Bath wipes        Bed mobility  Supine to Sit: Dependent/Total;2 Person assistance  Sit to Supine: Dependent/Total;2 Person assistance  Scooting: Dependent/Total  Bed Mobility Comments: .  Transfers  Sit to stand: Dependent/Total;2 Person assistance  Stand to sit: 2 Person assistance;Dependent/Total  Transfer Comments: attempted x 1 from EOB -- Pt demo no assistance ? cognition / desire  - able to achieve 75% of full stance -Dep x 2  for 2-3 sec -- returned to EOB.  Vision  Vision: Within Functional Limits  Hearing  Hearing:  Exceptions to WFL  Hearing Exceptions: Hard of hearing/hearing concerns;No hearing aid  Cognition  Overall Cognitive Status: Exceptions  Arousal/Alertness: Inconsistent responses to stimuli  Following Commands: Inconsistently follows commands  Attention Span: Difficulty attending to directions  Memory: Decreased short term memory;Decreased recall of recent events  Safety Judgement: Decreased awareness of need for safety  Insights: Not aware of deficits  Initiation: Requires cues for all  Sequencing: Requires cues for all  Cognition Comment: ? baseline cognitive level  Orientation  Overall Orientation Status: Impaired  Orientation Level: Oriented to person;Disoriented to place;Disoriented to situation;Disoriented to time        Education Given To: Patient;Family  Education Provided: Role of Therapy;Plan of Care;ADL Adaptive Strategies;IADL Safety  Education Method: Demonstration;Verbal  Barriers to Learning: Cognition  Education Outcome: Continued education needed       Hand Dominance  Hand Dominance: Right     AM-PAC - ADL  AM-PAC Daily Activity - Inpatient   How much help is needed for putting on and taking off regular lower body clothing?: Total  How much help is needed for bathing (which includes washing, rinsing, drying)?: Total  How much help is needed for toileting (which includes using toilet, bedpan, or urinal)?: Total  How much help is needed for putting on and taking off regular upper body clothing?: Total  How much help is needed for taking care of personal grooming?: A Lot  How much help for eating meals?: Total  AM-PAC Inpatient Daily Activity Raw Score: 7  AM-PAC Inpatient ADL T-Scale Score : 20.13  ADL Inpatient CMS 0-100% Score: 92.44  ADL Inpatient CMS G-Code Modifier : CM    Goals  Short Term Goals  Time Frame for Short Term Goals: by dc- no goals met  Short Term Goal 1: Pt will complete UB dressing with min A  Short Term Goal 2: Pt will complete BSC transfer with max A  Short Term Goal 3: Pt will

## 2024-02-06 NOTE — PROGRESS NOTES
Cardiology Consult Service  Daily Progress Note        Admit Date:  1/28/2024  Primary cardiologist: Dr Rosales, new     Reason for Consultation/Chief Complaint: PAFRVR    Subjective:      Zuleima Mir is a 83 y.o. female with a past medical history of smoker, COPD not on oxygen, IBS, HTN, HLP, DM, on chronic benzodiazepines and opioids.     Patient presented on 1/28 with decreased p.o. intake, altered mental status and fatigue with cough over the last few weeks.  She was hypoxic, febrile (Tmax 100.4), severely tachycardic 180s with low normal BP, requiring 15 L of supplemental oxygen for adequate saturation.  Creatinine 2.0, chest x-ray consistent with multifocal pneumonia.  ECG consistent with  bpm, nonspecific changes.  She was admitted for sepsis due to community-acquired pneumonia, NYA, PAF RVR, influenza infection.  She was initially placed on diltiazem drip and was given digoxin 500 mcg IV x 1 along with IV antibiotics.  She received LR 2 L as a bolus and was placed on  mill per hour.  Cardiology was consulted.  Patient subsequently became hypotensive and diltiazem drip was changed to amiodarone drip after discussing the case with me.     Patient currently appears hemodynamically stable.  Telemetry personally reviewed, reveals normal sinus rhythm rates in the 80s.  She is now requiring only 5 L of supplemental oxygen.  Heparin drip was held by primary team due to positive Hemoccult and GI was consulted.  Hemoglobin down to 13.9 from 16.8 (I suspect hemodilution after receiving IV fluids).  Patient appears confused, does not report any complaints.    CTA chest 1/20/2024 negative for PE, mucous plugging and bronchial thickening and bronchiectasis mostly in the lower lobes noted.    Echo 1/30/2024: Normal LV, LVEF 60%, mild aortic stenosis, mild to moderate MR, normal filling pressures.    Interval history:  Patient is currently on no supplemental oxygen.  Telemetry reveals normal sinus rhythm.  She is  hours.    Imaging:       Assessment & Plan:     1.  Sepsis due to community-acquired pneumonia.  On IV antibiotics per primary team.  2.  PAF RVR.  Most likely due to sepsis.  It is of unknown duration and patient was not on any anticoagulation.  Originally treated with diltiazem, switch to amiodarone by primary team due to hypotension.  Patient has now converted into sinus rhythm.  Heparin drip was held due to Hemoccult positive.  3.  Rule out GI bleed.  Hemoglobin drop is most likely due to hemodilution.  4.  NYA on CKD.  Creatinine improved after receiving IV fluids  5.  Influenza infection  6.  Hyponatremia.  It is most likely due to dehydration (also hemoglobin increased)           -Continue with Eliquis, Lopressor 25 mg p.o. twice daily and will start amiodarone 200 mg p.o. daily to maintain sinus rhythm.      There are no further recommendations at this time and hence I will now sign off. Patient may follow up in our clinic once discharged from the hospital for further cardiac care.             I have personally reviewed the reports and images of labs, radiological studies, cardiac studies including ECG's and telemetry, current and old medical records. The note was completed using EMR and Dragon dictation system. Every effort was made to ensure accuracy; however, inadvertent computerized transcription errors may be present.    All questions and concerns were addressed to the patient/family. Alternatives to my treatment were discussed.     Thank you for allowing to us to participate in the care or Zuleima Clarke. Please call our service with questions.    Karan Rosales MD, [facility], [facility] [address]  [address] [place] [address]  Ph: [phone]  Fax: [phone]

## 2024-02-06 NOTE — PROGRESS NOTES
Physical Therapy  Facility/Department: [address]  Physical Therapy Treatment    Name: Zuleima Mir  : 1940  MRN: [id number]  Date of Service: 2024    Discharge Recommendations:  Subacute/Skilled Nursing Facility   PT Equipment Recommendations  Other: Defer      Patient Diagnosis(es): The primary encounter diagnosis was Septicemia (HCC). Diagnoses of Pneumonia of both lower lobes due to infectious organism, Acute kidney injury (HCC), Atrial fibrillation with RVR (HCC), Acute hypoxic respiratory failure (HCC), and Influenza A were also pertinent to this visit.  Past Medical History:  has a past medical history of Allergic rhinitis, Emphysema (subcutaneous) (surgical) resulting from a procedure, Hyperlipidemia, Hypertension, IBS (irritable bowel syndrome), Pancreatic cyst, Type II or unspecified type diabetes mellitus without mention of complication, not stated as uncontrolled, Wears hearing aid in both ears, and Wears partial dentures.  Past Surgical History:  has a past surgical history that includes Cholecystectomy; Hysterectomy; Breast surgery; Colonoscopy; Upper gastrointestinal endoscopy (N/A, 2020); and Upper gastrointestinal endoscopy (2020).    Assessment   Assessment: Pt with minimal to no verbvalizations this date, also very [place].  Pt inconsistently following commands.  Pt assisted to EOB with dependent assist of 2.  Pt having difficulty maintaining her seated balance, requiring CGA to max assist.  Attempted standing at EOB, but pt unable to despite dependent assist of 2.  Rec SNF to maximize pt mobility and safety, as pt is presently far behind her baseline.  Will follow.  Treatment Diagnosis: impaired mobility associated with septicemia  Therapy Prognosis: Fair  Decision Making: Medium Complexity  Requires PT Follow-Up: Yes     Plan   Physical Therapy Plan  General Plan:  (2-5)  Current Treatment Recommendations: Strengthening, Balance training, Functional mobility training, Transfer

## 2024-02-06 NOTE — PROGRESS NOTES
141   K 3.8 3.9 4.8    105 103   CO2 33* 32 30   BUN 35* 34* 33*   CREATININE <0.5* <0.5* <0.5*   GLUCOSE 141* 143* 140*   CALCIUM 8.1* 8.2* 8.0*   MG 1.90 1.90 2.10   PHOS 2.2* 2.1* 2.8   ANIONGAP 6 5 8       Hepatic:   Recent Labs     02/04/24  2322 02/05/24  0617 02/05/24  1438   LABALBU 2.0* 2.2* 2.1*       Troponin: No results for input(s): \"TROPONINI\" in the last 72 hours.  BNP: No results for input(s): \"BNP\" in the last 72 hours.  Lipids: No results for input(s): \"CHOL\", \"HDL\" in the last 72 hours.    Invalid input(s): \"LDLCALCU\", \"TRIGLYCERIDE\"  ABGs:  No results for input(s): \"PHART\", \"SIR3CZV\", \"PO2ART\", \"GTF1DFA\", \"BEART\", \"THGBART\", \"S2WNZNOC\", \"NGX1WPV\" in the last 72 hours.    INR:   No results for input(s): \"INR\" in the last 72 hours.    Lactate: No results for input(s): \"LACTATE\" in the last 72 hours.  Cultures:  -----------------------------------------------------------------  RAD:   XR ABDOMEN (KUB) (SINGLE AP VIEW)   Final Result      Corpak with tip in the body of the stomach.      Electronically signed by Hector Ann MD      CT CHEST WO CONTRAST   Final Result         1. Multiple small cavitary lesions have developed in areas of prior round glass infiltrate particularly right upper lobe etiology uncertain possible bronchial or parenchymal destruction secondary to progressive pneumonia neoplasm other processes not    excluded.   2. Extensive bibasilar consolidation.   3. Underlying areas of bronchiectasis and mucous plugging.      Electronically signed by Scarlet Arellano      XR CHEST PORTABLE   Final Result   Bilateral CAD airspace disease with a cavitary lesion present in the right lower   lobe. This is new in comparison to January 28, 2024.      XR ABDOMEN (KUB) (SINGLE AP VIEW)   Final Result   Tip of the feeding tube overlies the left upper quadrant, in the   region of the gastric fundus.      XR ABDOMEN (KUB) (SINGLE AP VIEW)   Final Result   Feeding tube is evident. Distal  and imaging studies reviewed, agree with assessment and plan as outlined above.  Continue with current care and plan.  Discussed case with patients nurse, discussed case with care team, discussed plan.  Pt is doing ok better will likely need placement at dc with ongoing pc following d/w daughter    Candelario Awad MD FACP

## 2024-02-06 NOTE — PLAN OF CARE
Problem: Discharge Planning  Goal: Discharge to home or other facility with appropriate resources  Outcome: Progressing  Flowsheets (Taken 2/6/2024 0343)  Discharge to home or other facility with appropriate resources:   Identify barriers to discharge with patient and caregiver   Arrange for needed discharge resources and transportation as appropriate   Identify discharge learning needs (meds, wound care, etc)  Note: Pt plans to discharge to a SNF, awaiting decision on snf and PT/OT evals     Problem: Pain  Goal: Verbalizes/displays adequate comfort level or baseline comfort level  Outcome: Progressing  Flowsheets (Taken 2/6/2024 0343)  Verbalizes/displays adequate comfort level or baseline comfort level:   Encourage patient to monitor pain and request assistance   Assess pain using appropriate pain scale   Administer analgesics based on type and severity of pain and evaluate response  Note: Pt reported no pain, patient was repositioned Q2     Problem: Safety - Adult  Goal: Free from fall injury  Outcome: Progressing  Flowsheets (Taken 2/6/2024 0343)  Free From Fall Injury: Instruct family/caregiver on patient safety  Note: Pt is a Fall Risk. See Guerrier Fall Risk Score. Pt bed in low position and side rails up. Call light and belongings in reach. Pt encouraged to call for assistance. Will continue with hourly rounds for PO intake, pain needs, toileting, and repositioning as needed.

## 2024-02-06 NOTE — CARE COORDINATION
Sent 5 SNF referrals out for consideration. Electronically signed by Arlen Johansen RN on 2/6/2024 at 6:41 PM

## 2024-02-06 NOTE — PROGRESS NOTES
Speech-Language Pathology    Reviewed chart, spoke with RN. Attempted to see patient, however she is currently too sleepy. Will plan to check back at later date/time as able/appropriate.    Yarelis Nur, SLP, [id number]  Ph. # [phone]

## 2024-02-06 NOTE — PLAN OF CARE
Problem: Discharge Planning  Goal: Discharge to home or other facility with appropriate resources  2/6/2024 1118 by Theodora Gonzales RN  Outcome: Progressing  Flowsheets (Taken 2/6/2024 1118)  Discharge to home or other facility with appropriate resources:   Identify barriers to discharge with patient and caregiver   Arrange for needed discharge resources and transportation as appropriate   Identify discharge learning needs (meds, wound care, etc)     Problem: Pain  Goal: Verbalizes/displays adequate comfort level or baseline comfort level  2/6/2024 1118 by Theodora Gonzales RN  Outcome: Progressing  Flowsheets (Taken 2/6/2024 1118)  Verbalizes/displays adequate comfort level or baseline comfort level:   Encourage patient to monitor pain and request assistance   Assess pain using appropriate pain scale   Administer analgesics based on type and severity of pain and evaluate response   Implement non-pharmacological measures as appropriate and evaluate response  Note: Patient denies pain this shift, will continue to assess patient pain levels and provide intervention as needed.      Problem: Safety - Adult  Goal: Free from fall injury  2/6/2024 1118 by Theodora Gonzales RN  Outcome: Progressing  Flowsheets (Taken 2/6/2024 1118)  Free From Fall Injury: Instruct family/caregiver on patient safety  Note: Pt will remain free from accidental injury this shift. Pt has fall risk measures (fall risk bracelet, fall risk sign, fall risk cloth, non-slip socks, dome light on) in place. Pt bed is in low position, bed alarm on, bed wheels locked, call light within reach, bedside table within reach, chair wheels locked, chair alarm on.        Problem: Cardiovascular - Adult  Goal: Maintains optimal cardiac output and hemodynamic stability  Outcome: Progressing  Flowsheets (Taken 2/6/2024 1118)  Maintains optimal cardiac output and hemodynamic stability:   Monitor blood pressure and heart rate   Monitor urine output and notify Licensed  Independent Practitioner for values outside of normal range   Assess for signs of decreased cardiac output   Administer fluid and/or volume expanders as ordered  Note: Continuous telemetry monitoring applied to patient, currently displays NSR     Problem: Respiratory - Adult  Goal: Achieves optimal ventilation and oxygenation  Outcome: Progressing  Flowsheets (Taken 2/6/2024 1118)  Achieves optimal ventilation and oxygenation:   Assess for changes in respiratory status   Assess for changes in mentation and behavior   Position to facilitate oxygenation and minimize respiratory effort   Oxygen supplementation based on oxygen saturation or arterial blood gases  Note: Weaning patient O2 as tolerated back to baseline of RA, patient SpO2 remains WNL      Problem: Skin/Tissue Integrity  Goal: Absence of new skin breakdown  Description: 1.  Monitor for areas of redness and/or skin breakdown  2.  Assess vascular access sites hourly  3.  Every 4-6 hours minimum:  Change oxygen saturation probe site  4.  Every 4-6 hours:  If on nasal continuous positive airway pressure, respiratory therapy assess nares and determine need for appliance change or resting period.  Outcome: Progressing  Note: Turning patient at least every two hours to prevent further skin breakdown and alleviate pressure on bony prominences      Problem: ABCDS Injury Assessment  Goal: Absence of physical injury  Outcome: Progressing  Flowsheets (Taken 2/6/2024 1118)  Absence of Physical Injury: Implement safety measures based on patient assessment  Note: Fall precautions in place at this time     Problem: Safety - Medical Restraint  Goal: Remains free of injury from restraints (Restraint for Interference with Medical Device)  Description: INTERVENTIONS:  1. Determine that other, less restrictive measures have been tried or would not be effective before applying the restraint  2. Evaluate the patient's condition at the time of restraint application  3. Inform

## 2024-02-07 ENCOUNTER — APPOINTMENT (OUTPATIENT)
Dept: GENERAL RADIOLOGY | Age: 84
DRG: 871 | End: 2024-02-07
Payer: MEDICARE

## 2024-02-07 LAB
ALBUMIN SERPL-MCNC: 2.1 G/DL (ref 3.4–5)
ALBUMIN SERPL-MCNC: 2.1 G/DL (ref 3.4–5)
ANION GAP SERPL CALCULATED.3IONS-SCNC: 5 MMOL/L (ref 3–16)
ANION GAP SERPL CALCULATED.3IONS-SCNC: 7 MMOL/L (ref 3–16)
BASOPHILS # BLD: 0.1 K/UL (ref 0–0.2)
BASOPHILS NFR BLD: 0.8 %
BUN SERPL-MCNC: 24 MG/DL (ref 7–20)
BUN SERPL-MCNC: 25 MG/DL (ref 7–20)
CALCIUM SERPL-MCNC: 8.1 MG/DL (ref 8.3–10.6)
CALCIUM SERPL-MCNC: 8.1 MG/DL (ref 8.3–10.6)
CHLORIDE SERPL-SCNC: 105 MMOL/L (ref 99–110)
CHLORIDE SERPL-SCNC: 106 MMOL/L (ref 99–110)
CO2 SERPL-SCNC: 28 MMOL/L (ref 21–32)
CO2 SERPL-SCNC: 30 MMOL/L (ref 21–32)
CREAT SERPL-MCNC: <0.5 MG/DL (ref 0.6–1.2)
CREAT SERPL-MCNC: <0.5 MG/DL (ref 0.6–1.2)
DEPRECATED RDW RBC AUTO: 13.2 % (ref 12.4–15.4)
EOSINOPHIL # BLD: 0 K/UL (ref 0–0.6)
EOSINOPHIL NFR BLD: 0.2 %
GFR SERPLBLD CREATININE-BSD FMLA CKD-EPI: >60 ML/MIN/{1.73_M2}
GFR SERPLBLD CREATININE-BSD FMLA CKD-EPI: >60 ML/MIN/{1.73_M2}
GLUCOSE SERPL-MCNC: 108 MG/DL (ref 70–99)
GLUCOSE SERPL-MCNC: 149 MG/DL (ref 70–99)
HCT VFR BLD AUTO: 31.1 % (ref 36–48)
HGB BLD-MCNC: 10.6 G/DL (ref 12–16)
LYMPHOCYTES # BLD: 0.7 K/UL (ref 1–5.1)
LYMPHOCYTES NFR BLD: 9.9 %
MAGNESIUM SERPL-MCNC: 2.1 MG/DL (ref 1.8–2.4)
MAGNESIUM SERPL-MCNC: 2.1 MG/DL (ref 1.8–2.4)
MCH RBC QN AUTO: 31.3 PG (ref 26–34)
MCHC RBC AUTO-ENTMCNC: 33.9 G/DL (ref 31–36)
MCV RBC AUTO: 92.2 FL (ref 80–100)
MONOCYTES # BLD: 0.5 K/UL (ref 0–1.3)
MONOCYTES NFR BLD: 6.3 %
NEUTROPHILS # BLD: 6.2 K/UL (ref 1.7–7.7)
NEUTROPHILS NFR BLD: 82.8 %
PHOSPHATE SERPL-MCNC: 2.5 MG/DL (ref 2.5–4.9)
PHOSPHATE SERPL-MCNC: 2.5 MG/DL (ref 2.5–4.9)
PLATELET # BLD AUTO: 209 K/UL (ref 135–450)
PMV BLD AUTO: 9.2 FL (ref 5–10.5)
POTASSIUM SERPL-SCNC: 3.7 MMOL/L (ref 3.5–5.1)
POTASSIUM SERPL-SCNC: 4 MMOL/L (ref 3.5–5.1)
RBC # BLD AUTO: 3.37 M/UL (ref 4–5.2)
SODIUM SERPL-SCNC: 140 MMOL/L (ref 136–145)
SODIUM SERPL-SCNC: 141 MMOL/L (ref 136–145)
WBC # BLD AUTO: 7.4 K/UL (ref 4–11)

## 2024-02-07 PROCEDURE — 2700000000 HC OXYGEN THERAPY PER DAY

## 2024-02-07 PROCEDURE — 6360000002 HC RX W HCPCS

## 2024-02-07 PROCEDURE — 6360000002 HC RX W HCPCS: Performed by: INTERNAL MEDICINE

## 2024-02-07 PROCEDURE — 92526 ORAL FUNCTION THERAPY: CPT

## 2024-02-07 PROCEDURE — 83735 ASSAY OF MAGNESIUM: CPT

## 2024-02-07 PROCEDURE — 6370000000 HC RX 637 (ALT 250 FOR IP)

## 2024-02-07 PROCEDURE — A4216 STERILE WATER/SALINE, 10 ML: HCPCS

## 2024-02-07 PROCEDURE — 2060000000 HC ICU INTERMEDIATE R&B

## 2024-02-07 PROCEDURE — 36415 COLL VENOUS BLD VENIPUNCTURE: CPT

## 2024-02-07 PROCEDURE — C9113 INJ PANTOPRAZOLE SODIUM, VIA: HCPCS

## 2024-02-07 PROCEDURE — 2580000003 HC RX 258

## 2024-02-07 PROCEDURE — 94761 N-INVAS EAR/PLS OXIMETRY MLT: CPT

## 2024-02-07 PROCEDURE — 6370000000 HC RX 637 (ALT 250 FOR IP): Performed by: INTERNAL MEDICINE

## 2024-02-07 PROCEDURE — 80069 RENAL FUNCTION PANEL: CPT

## 2024-02-07 PROCEDURE — 94640 AIRWAY INHALATION TREATMENT: CPT

## 2024-02-07 PROCEDURE — 85025 COMPLETE CBC W/AUTO DIFF WBC: CPT

## 2024-02-07 PROCEDURE — 94669 MECHANICAL CHEST WALL OSCILL: CPT

## 2024-02-07 PROCEDURE — 74230 X-RAY XM SWLNG FUNCJ C+: CPT

## 2024-02-07 PROCEDURE — 92611 MOTION FLUOROSCOPY/SWALLOW: CPT

## 2024-02-07 RX ADMIN — THERA TABS 1 TABLET: TAB at 09:03

## 2024-02-07 RX ADMIN — LEVALBUTEROL HYDROCHLORIDE 0.63 MG: 0.63 SOLUTION RESPIRATORY (INHALATION) at 15:38

## 2024-02-07 RX ADMIN — SODIUM CHLORIDE, PRESERVATIVE FREE 40 MG: 5 INJECTION INTRAVENOUS at 09:04

## 2024-02-07 RX ADMIN — IPRATROPIUM BROMIDE 0.5 MG: 0.5 SOLUTION RESPIRATORY (INHALATION) at 15:38

## 2024-02-07 RX ADMIN — METOPROLOL TARTRATE 25 MG: 25 TABLET, FILM COATED ORAL at 20:55

## 2024-02-07 RX ADMIN — SODIUM CHLORIDE, PRESERVATIVE FREE 40 MG: 5 INJECTION INTRAVENOUS at 20:55

## 2024-02-07 RX ADMIN — Medication: at 20:56

## 2024-02-07 RX ADMIN — LEVALBUTEROL HYDROCHLORIDE 0.63 MG: 0.63 SOLUTION RESPIRATORY (INHALATION) at 20:08

## 2024-02-07 RX ADMIN — PREDNISONE 20 MG: 20 TABLET ORAL at 09:03

## 2024-02-07 RX ADMIN — THIAMINE HYDROCHLORIDE 100 MG: 100 INJECTION, SOLUTION INTRAMUSCULAR; INTRAVENOUS at 09:04

## 2024-02-07 RX ADMIN — Medication: at 09:05

## 2024-02-07 RX ADMIN — APIXABAN 5 MG: 5 TABLET, FILM COATED ORAL at 09:04

## 2024-02-07 RX ADMIN — IPRATROPIUM BROMIDE 0.5 MG: 0.5 SOLUTION RESPIRATORY (INHALATION) at 07:39

## 2024-02-07 RX ADMIN — SODIUM CHLORIDE, PRESERVATIVE FREE 10 ML: 5 INJECTION INTRAVENOUS at 09:05

## 2024-02-07 RX ADMIN — BUDESONIDE 250 MCG: 0.25 INHALANT RESPIRATORY (INHALATION) at 20:09

## 2024-02-07 RX ADMIN — IPRATROPIUM BROMIDE 0.5 MG: 0.5 SOLUTION RESPIRATORY (INHALATION) at 20:08

## 2024-02-07 RX ADMIN — BUDESONIDE 250 MCG: 0.25 INHALANT RESPIRATORY (INHALATION) at 07:39

## 2024-02-07 RX ADMIN — SODIUM CHLORIDE, PRESERVATIVE FREE 10 ML: 5 INJECTION INTRAVENOUS at 20:56

## 2024-02-07 RX ADMIN — APIXABAN 5 MG: 5 TABLET, FILM COATED ORAL at 20:55

## 2024-02-07 RX ADMIN — FOLIC ACID 5 MG: 1 TABLET ORAL at 09:04

## 2024-02-07 RX ADMIN — AMIODARONE HYDROCHLORIDE 200 MG: 200 TABLET ORAL at 09:03

## 2024-02-07 RX ADMIN — METOPROLOL TARTRATE 25 MG: 25 TABLET, FILM COATED ORAL at 09:04

## 2024-02-07 RX ADMIN — LEVALBUTEROL HYDROCHLORIDE 0.63 MG: 0.63 SOLUTION RESPIRATORY (INHALATION) at 07:39

## 2024-02-07 ASSESSMENT — PAIN SCALES - PAIN ASSESSMENT IN ADVANCED DEMENTIA (PAINAD)
BODYLANGUAGE: 0
FACIALEXPRESSION: 0
BREATHING: 1
NEGVOCALIZATION: 0
FACIALEXPRESSION: 0
FACIALEXPRESSION: 0
BODYLANGUAGE: 0
BREATHING: 1
CONSOLABILITY: 0
NEGVOCALIZATION: 0
NEGVOCALIZATION: 0
TOTALSCORE: 1
BODYLANGUAGE: 0
FACIALEXPRESSION: 0
CONSOLABILITY: 0
NEGVOCALIZATION: 0
CONSOLABILITY: 0
NEGVOCALIZATION: 0
TOTALSCORE: 1
BREATHING: 0
TOTALSCORE: 0
TOTALSCORE: 1
TOTALSCORE: 0
BODYLANGUAGE: 0
BODYLANGUAGE: 0
BREATHING: 0
FACIALEXPRESSION: 0
BREATHING: 1

## 2024-02-07 ASSESSMENT — PAIN SCALES - GENERAL
PAINLEVEL_OUTOF10: 0
PAINLEVEL_OUTOF10: 0

## 2024-02-07 ASSESSMENT — PAIN SCALES - WONG BAKER
WONGBAKER_NUMERICALRESPONSE: 0
WONGBAKER_NUMERICALRESPONSE: 0

## 2024-02-07 NOTE — PROGRESS NOTES
Nutrition Note    Advance TF from 20 ml/hr to 30 ml/hr given stabilized electrolytes. Electrolyte panel ordered Q12. Advance 10 ml/hr q12 to goal of 40 ml/hr.    Electronically signed by MICHELLE Arriola, [id number]   [place]ato:  [phone]  Office:  [phone]

## 2024-02-07 NOTE — PROGRESS NOTES
Messaged resident team covering patient for placement of corpak. Okay to use newly placed corpak per Dr. Parikh. The abdominal KUB XRAY was completed and impression was documented as follows \"Enteric tube tip projects over the proximal gastric body, slightly retracted compared to prior study.\". Resuming tube feed at prior rate of 20ml/hr    Electronically signed by Theodora Gonzales RN on 2/6/2024 at 7:28 PM

## 2024-02-07 NOTE — PROCEDURES
INSTRUMENTAL SWALLOW REPORT  MODIFIED BARIUM SWALLOW    NAME: Zuleima Mir     : 1940  MRN: [id number]  24     Date of Eval: 2024     Ordering Physician: Dr Awad  Referring Diagnosis: Dysphagia    Past Medical History:  has a past medical history of Allergic rhinitis, Emphysema (subcutaneous) (surgical) resulting from a procedure, Hyperlipidemia, Hypertension, IBS (irritable bowel syndrome), Pancreatic cyst, Type II or unspecified type diabetes mellitus without mention of complication, not stated as uncontrolled, Wears hearing aid in both ears, and Wears partial dentures.  Past Surgical History:  has a past surgical history that includes Cholecystectomy; Hysterectomy; Breast surgery; Colonoscopy; Upper gastrointestinal endoscopy (N/A, 2020); and Upper gastrointestinal endoscopy (2020).    CXR: 2024  Stable appearance of the chest with multifocal bilateral pulmonary  opacities.    Prior MBS Results: n/a    Patient Complaints/Reason for Referral:  Zuleima Mir was referred for a MBS to assess the efficiency of his/her swallow function, assess for aspiration, and to make recommendations regarding safe dietary consistencies, effective compensatory strategies, and safe eating environment.    Onset of problem:   24    Behavior/Cognition: alert, confused  Vision/Hearing: hearing and vision decreased      Impressions:  Oral Phase  Oropharyngeal phase of swallowing characterized by the following:  Reduced oral bolus control / oral sensation and awareness resulting in premature spillage to the pharynx.  With nectar thick, bolus spilled directly into pyriforms.   Impaired bolus cohesion resulting in  bolus loss to the pharynx  Pharyngeal Phase  Reduced lingual base retraction as evidenced by residue in the valleculae after the swallow, more so with puree and soft solids. Pt did not follow instruction for extra /effortful swallow  Presence of NG tube partially responsible for reduced  epiglottic inversion resulting in aspiration of thin liquids. Pt exhibited throat clear, though no cough with aspiration. It should be noted that pt also exhibited throat clear when aspiration was not occurring.Pt did not follow instruction for volitional cough /swallow, therefore additional strategies not assessed   No aspiration identified with puree, honey and nectar thick liquids.      Treatment Dx and ICD 10: dysphagia  Position: Lateral and upright  Consistencies administered: puree, honey, nectar, thin and soft solids    Penetration Aspiration Scale (PAS)  [x] 7 Material enters the airway, passes below the vocal folds, and is not ejected from the trachea despite effort    Dysphagia Outcome Severity Scale  [x] Level 3: Moderate dysphagia - Total assistance, supervision or strategies. Two or more diet consistencies restricted    Recommendations/Treatment  SLP intervention indicated: yes    Recommended Exercises:   Effortful swallow    Education: Images and recommendations were reviewed with the patient following this exam. Pt indicated comprehension    Goals:    Goal 1: Patient/caregiver will demonstrate understanding of dysphagia recommendations/concerns.  2/2: provided education to patient re: role of SLP, swallow function, aspiration concerns, instrumental assessment; pt with questionable comprehension/reception of information. No family present.  Cont goal  2/7: Pt educated to purpose of visit and recommendation for MBS. Pt does not indicate full comprehension  Cont goal  2/7: pt educated to results of MBS and recommendations.  Pt does not indicate full comprehension  Cont goal    Goal 2: Patient will participate in instrumental assessment of swallow function as appropriate.  2/2: not appropriate this date, please see goal 3 below.  2/7: scheduled this date - goal met     Goal 3: Patient will participate in repeat bedside swallowing assessment.  2/2: Cleared by RN to see patient bedside. Pt remains on NRB,

## 2024-02-07 NOTE — PROGRESS NOTES
Progress Note    Admit Date: 1/28/2024  Day: 10  Diet: ADULT DIET; Full Liquid  ADULT ORAL NUTRITION SUPPLEMENT; Breakfast, Dinner; Standard High Calorie/High Protein Oral Supplement  ADULT TUBE FEEDING; Nasogastric; Standard with Fiber; Continuous; 30; Yes; 10; Q 12 hours; 40; 100; Q 4 hours; Protein; 1 Dose; Daily    CC: AMS, fatigue    Interval history:   NAEO. Continually improving O2 requirements. Mentation has continued to improve. Cor-odell self-removed overnight, has been placed again. Tolerating tube feeds.    Medications:     Scheduled Meds:   amiodarone  200 mg Oral Daily    predniSONE  20 mg Per NG tube Daily    Followed by    [START ON 2/9/2024] predniSONE  15 mg Per NG tube Daily    Followed by    [START ON 2/12/2024] predniSONE  10 mg Per NG tube Daily    Followed by    [START ON 2/15/2024] predniSONE  5 mg Per NG tube Daily    metoprolol tartrate  25 mg Oral BID    thiamine  100 mg IntraVENous Daily    levalbuterol  0.63 mg Nebulization 4x Daily RT    [Held by provider] metoprolol  5 mg IntraVENous Q6H    apixaban  5 mg Oral BID    multivitamin  1 tablet Oral Daily    folic acid  5 mg Oral Daily    balsum peru-castor oil   Topical BID    budesonide  0.25 mg Nebulization BID RT    sodium chloride flush  5-40 mL IntraVENous 2 times per day    ipratropium  0.5 mg Nebulization 4x Daily RT    pantoprazole (PROTONIX) 40 mg in sodium chloride (PF) 0.9 % 10 mL injection  40 mg IntraVENous BID     Continuous Infusions:   sodium chloride 25 mL (02/05/24 0444)     PRN Meds:hydrOXYzine HCl, sodium chloride flush, sodium chloride, ondansetron **OR** ondansetron, polyethylene glycol, acetaminophen **OR** acetaminophen, [Held by provider] ALPRAZolam, perflutren lipid microspheres    Objective:   Vitals:   T-max:  Patient Vitals for the past 8 hrs:   BP Temp Temp src Pulse Resp SpO2 Weight   02/07/24 1002 -- -- -- 72 -- -- --   02/07/24 0853 (!) 127/55 98.3 °F (36.8 °C) Bladder 83 27 93 % --   02/07/24 0753 -- -- --  evidence of active GI bleeding.   3.  Extensive atherosclerosis of the abdominal aorta with infrarenal abdominal aortic aneurysm measuring up to 4.3 cm.   4.  Prior cholecystectomy. Dilated intra and extrahepatic bile ducts and prominent pancreatic duct. Correlate with enzyme markers and ERCP if clinically indicated.   5.  Extensive bilateral pulmonary consolidations concerning for pneumonia. She separately dictated CT chest for full findings.         Electronically signed by Javier Browne MD      CT HEAD WO CONTRAST   Final Result      Left mastoid disease.      Age-related changes in the brain.      Electronically signed by Pankaj Andrade MD      XR CHEST PORTABLE   Final Result      Extensive left lower lung and patchy right lower lung consolidation most compatible with multifocal pneumonia.      Normal cardiomediastinal silhouette.      Electronically signed by Pankaj Andrade MD      FL MODIFIED BARIUM SWALLOW W VIDEO    (Results Pending)       Assessment/Plan:     ##AMS 2/2 to pneumonia   Likely component of influenza A  Meets severe sepsis with organ dysfunction  Subsegmental pulmonary embolism  Hx of COPD  Progressively worsening of weakness, PO intake, fatigue, and AMS over the past few weeks. Daughter endorses she had some coughs as well. LA 3.4-> 12.7. CXR showed extensive left lower lung and patchy right lower lung consolidation most compatible with multifocal pneumonia. Received septic bolus. CTPE showing some mucus plugging in the bilateral lower lobes. Waxing and waning O2 requirements. With new onset cavitary lesions in the right lung. Suspect 2/2 to bacterial infection in the setting of positive influenza. Has completed course of antibiotics with significant improvement in O2 requirements  - On empiric antibiotics   - Meropenem   - Influenza positive  - Blood cultures, legionella, MRSA, pneumonia panel, strep pneumo antigen -- positive for staph epi on 1/2 cultures, likely contaminant. MRSA probe

## 2024-02-07 NOTE — PLAN OF CARE
Problem: Discharge Planning  Goal: Discharge to home or other facility with appropriate resources  Outcome: Progressing  Flowsheets (Taken 2/7/2024 1456)  Discharge to home or other facility with appropriate resources:   Identify barriers to discharge with patient and caregiver   Arrange for needed discharge resources and transportation as appropriate   Identify discharge learning needs (meds, wound care, etc)     Problem: Pain  Goal: Verbalizes/displays adequate comfort level or baseline comfort level  Outcome: Progressing  Flowsheets (Taken 2/7/2024 1456)  Verbalizes/displays adequate comfort level or baseline comfort level:   Encourage patient to monitor pain and request assistance   Assess pain using appropriate pain scale   Administer analgesics based on type and severity of pain and evaluate response     Problem: Safety - Adult  Goal: Free from fall injury  Outcome: Progressing  Flowsheets (Taken 2/7/2024 1456)  Free From Fall Injury: Instruct family/caregiver on patient safety     Problem: Neurosensory - Adult  Goal: Achieves stable or improved neurological status  Outcome: Progressing  Flowsheets (Taken 2/7/2024 1456)  Achieves stable or improved neurological status: Assess for and report changes in neurological status     Problem: Cardiovascular - Adult  Goal: Maintains optimal cardiac output and hemodynamic stability  Outcome: Progressing  Flowsheets (Taken 2/7/2024 1456)  Maintains optimal cardiac output and hemodynamic stability:   Monitor blood pressure and heart rate   Monitor urine output and notify Licensed Independent Practitioner for values outside of normal range     Problem: Respiratory - Adult  Goal: Achieves optimal ventilation and oxygenation  Outcome: Progressing  Flowsheets (Taken 2/7/2024 1456)  Achieves optimal ventilation and oxygenation:   Assess for changes in respiratory status   Assess for changes in mentation and behavior     Problem: Genitourinary - Adult  Goal: Absence of urinary

## 2024-02-07 NOTE — PROGRESS NOTES
Speech Language Pathology  Facility/Department:[facility] PCU  DYSPHAGIA Therapy Note    Name: Zuleima Mir  : 1940  MRN: [id number]                                                     Patient Diagnosis(es):   Patient Active Problem List    Diagnosis Date Noted    Acute hypoxic respiratory failure (HCC) 2024    Pulmonary nodules/lesions, multiple 2024    Encounter for palliative care 2024    Atrial fibrillation with RVR (HCC) 2024    Acute pulmonary embolism without acute cor pulmonale (HCC) 2024    Septicemia (HCC) 2024    Severe sepsis (HCC) 2024     Past Medical History:   Diagnosis Date    Allergic rhinitis     Emphysema (subcutaneous) (surgical) resulting from a procedure     Hyperlipidemia     Hypertension     IBS (irritable bowel syndrome)     Pancreatic cyst     Type II or unspecified type diabetes mellitus without mention of complication, not stated as uncontrolled     diet controlled    Wears hearing aid in both ears     Wears partial dentures     lower     Past Surgical History:   Procedure Laterality Date    BREAST SURGERY      biopsy    CHOLECYSTECTOMY      COLONOSCOPY      HYSTERECTOMY (CERVIX STATUS UNKNOWN)      UPPER GASTROINTESTINAL ENDOSCOPY N/A 2020    ESOPHAGOGASTRODUODENOSCOPY WITH ENDOSCOPIC ULTRASOUND AND MONITORED ANESTHESIA CARE performed by Fawad Cabrera MD at [facility] ENDOSCOPY    UPPER GASTROINTESTINAL ENDOSCOPY  2020    EGD BIOPSY performed by Fawad Cabrera MD at [facility] ENDOSCOPY     History of Present Illness  Per MD notes:  \"My assessment reveals an 83-year-old female lifelong smoker with significant past history of COPD not on oxygen, diabetes type 2, hypertension hyperlipidemia, on chronic benzos and opioids who was brought to the emergency room unresponsive, hypoxic and tachycardic in the 170s.  In emergency room she had a low-grade temperature of 37.8, initial blood pressure was 102/52, heart rate was 180

## 2024-02-08 VITALS
TEMPERATURE: 97.5 F | SYSTOLIC BLOOD PRESSURE: 143 MMHG | OXYGEN SATURATION: 95 % | HEIGHT: 62 IN | DIASTOLIC BLOOD PRESSURE: 57 MMHG | WEIGHT: 96.5 LBS | BODY MASS INDEX: 17.76 KG/M2 | RESPIRATION RATE: 18 BRPM | HEART RATE: 74 BPM

## 2024-02-08 LAB
ALBUMIN SERPL-MCNC: 2.1 G/DL (ref 3.4–5)
ALBUMIN SERPL-MCNC: 2.1 G/DL (ref 3.4–5)
ANION GAP SERPL CALCULATED.3IONS-SCNC: 5 MMOL/L (ref 3–16)
ANION GAP SERPL CALCULATED.3IONS-SCNC: 7 MMOL/L (ref 3–16)
BASOPHILS # BLD: 0 K/UL (ref 0–0.2)
BASOPHILS NFR BLD: 0.2 %
BUN SERPL-MCNC: 21 MG/DL (ref 7–20)
BUN SERPL-MCNC: 21 MG/DL (ref 7–20)
CALCIUM SERPL-MCNC: 7.6 MG/DL (ref 8.3–10.6)
CALCIUM SERPL-MCNC: 7.8 MG/DL (ref 8.3–10.6)
CHLORIDE SERPL-SCNC: 105 MMOL/L (ref 99–110)
CHLORIDE SERPL-SCNC: 107 MMOL/L (ref 99–110)
CO2 SERPL-SCNC: 27 MMOL/L (ref 21–32)
CO2 SERPL-SCNC: 28 MMOL/L (ref 21–32)
CREAT SERPL-MCNC: <0.5 MG/DL (ref 0.6–1.2)
CREAT SERPL-MCNC: <0.5 MG/DL (ref 0.6–1.2)
DEPRECATED RDW RBC AUTO: 12.8 % (ref 12.4–15.4)
EOSINOPHIL # BLD: 0 K/UL (ref 0–0.6)
EOSINOPHIL NFR BLD: 0.4 %
GFR SERPLBLD CREATININE-BSD FMLA CKD-EPI: >60 ML/MIN/{1.73_M2}
GFR SERPLBLD CREATININE-BSD FMLA CKD-EPI: >60 ML/MIN/{1.73_M2}
GLUCOSE SERPL-MCNC: 81 MG/DL (ref 70–99)
GLUCOSE SERPL-MCNC: 92 MG/DL (ref 70–99)
HCT VFR BLD AUTO: 28.1 % (ref 36–48)
HGB BLD-MCNC: 9.7 G/DL (ref 12–16)
LYMPHOCYTES # BLD: 0.6 K/UL (ref 1–5.1)
LYMPHOCYTES NFR BLD: 8.9 %
MAGNESIUM SERPL-MCNC: 2 MG/DL (ref 1.8–2.4)
MCH RBC QN AUTO: 31.6 PG (ref 26–34)
MCHC RBC AUTO-ENTMCNC: 34.6 G/DL (ref 31–36)
MCV RBC AUTO: 91.3 FL (ref 80–100)
MONOCYTES # BLD: 0.4 K/UL (ref 0–1.3)
MONOCYTES NFR BLD: 6.1 %
NEUTROPHILS # BLD: 6.1 K/UL (ref 1.7–7.7)
NEUTROPHILS NFR BLD: 84.4 %
PHOSPHATE SERPL-MCNC: 2.6 MG/DL (ref 2.5–4.9)
PHOSPHATE SERPL-MCNC: 3 MG/DL (ref 2.5–4.9)
PLATELET # BLD AUTO: 210 K/UL (ref 135–450)
PMV BLD AUTO: 9.5 FL (ref 5–10.5)
POTASSIUM SERPL-SCNC: 3.6 MMOL/L (ref 3.5–5.1)
POTASSIUM SERPL-SCNC: 3.7 MMOL/L (ref 3.5–5.1)
RBC # BLD AUTO: 3.08 M/UL (ref 4–5.2)
SODIUM SERPL-SCNC: 138 MMOL/L (ref 136–145)
SODIUM SERPL-SCNC: 141 MMOL/L (ref 136–145)
WBC # BLD AUTO: 7.2 K/UL (ref 4–11)

## 2024-02-08 PROCEDURE — 51798 US URINE CAPACITY MEASURE: CPT

## 2024-02-08 PROCEDURE — 97530 THERAPEUTIC ACTIVITIES: CPT

## 2024-02-08 PROCEDURE — 6360000002 HC RX W HCPCS: Performed by: INTERNAL MEDICINE

## 2024-02-08 PROCEDURE — 2580000003 HC RX 258

## 2024-02-08 PROCEDURE — 6360000002 HC RX W HCPCS

## 2024-02-08 PROCEDURE — 6370000000 HC RX 637 (ALT 250 FOR IP)

## 2024-02-08 PROCEDURE — 6370000000 HC RX 637 (ALT 250 FOR IP): Performed by: INTERNAL MEDICINE

## 2024-02-08 PROCEDURE — C9113 INJ PANTOPRAZOLE SODIUM, VIA: HCPCS

## 2024-02-08 PROCEDURE — 2700000000 HC OXYGEN THERAPY PER DAY

## 2024-02-08 PROCEDURE — 36415 COLL VENOUS BLD VENIPUNCTURE: CPT

## 2024-02-08 PROCEDURE — 83735 ASSAY OF MAGNESIUM: CPT

## 2024-02-08 PROCEDURE — 97535 SELF CARE MNGMENT TRAINING: CPT

## 2024-02-08 PROCEDURE — 92526 ORAL FUNCTION THERAPY: CPT

## 2024-02-08 PROCEDURE — 94640 AIRWAY INHALATION TREATMENT: CPT

## 2024-02-08 PROCEDURE — A4216 STERILE WATER/SALINE, 10 ML: HCPCS

## 2024-02-08 PROCEDURE — 94761 N-INVAS EAR/PLS OXIMETRY MLT: CPT

## 2024-02-08 PROCEDURE — 80069 RENAL FUNCTION PANEL: CPT

## 2024-02-08 PROCEDURE — 85025 COMPLETE CBC W/AUTO DIFF WBC: CPT

## 2024-02-08 RX ORDER — AMIODARONE HYDROCHLORIDE 200 MG/1
200 TABLET ORAL DAILY
Qty: 30 TABLET | Refills: 3 | Status: SHIPPED | OUTPATIENT
Start: 2024-02-09

## 2024-02-08 RX ORDER — POLYETHYLENE GLYCOL 3350 17 G/17G
17 POWDER, FOR SOLUTION ORAL DAILY PRN
Qty: 30 PACKET | Refills: 1 | Status: SHIPPED | OUTPATIENT
Start: 2024-02-08 | End: 2024-04-08

## 2024-02-08 RX ORDER — MULTIVITAMIN WITH IRON
1 TABLET ORAL DAILY
Qty: 30 TABLET | Refills: 3 | Status: SHIPPED | OUTPATIENT
Start: 2024-02-09

## 2024-02-08 RX ORDER — ONDANSETRON 4 MG/1
4 TABLET, ORALLY DISINTEGRATING ORAL EVERY 8 HOURS PRN
Qty: 60 TABLET | Refills: 1 | Status: SHIPPED | OUTPATIENT
Start: 2024-02-08

## 2024-02-08 RX ORDER — PREDNISONE 10 MG/1
10 TABLET ORAL DAILY
Qty: 3 TABLET | Refills: 0 | Status: SHIPPED | OUTPATIENT
Start: 2024-02-12 | End: 2024-02-15

## 2024-02-08 RX ORDER — PREDNISONE 5 MG/1
5 TABLET ORAL DAILY
Qty: 3 TABLET | Refills: 0 | Status: SHIPPED | OUTPATIENT
Start: 2024-02-15 | End: 2024-02-18

## 2024-02-08 RX ORDER — FOLIC ACID 1 MG/1
5 TABLET ORAL DAILY
Qty: 30 TABLET | Refills: 3 | Status: SHIPPED | OUTPATIENT
Start: 2024-02-09

## 2024-02-08 RX ORDER — HYDROXYZINE HYDROCHLORIDE 10 MG/1
10 TABLET, FILM COATED ORAL 3 TIMES DAILY PRN
Qty: 90 TABLET | Refills: 3 | Status: SHIPPED | OUTPATIENT
Start: 2024-02-08 | End: 2024-06-07

## 2024-02-08 RX ORDER — PREDNISONE 5 MG/1
15 TABLET ORAL DAILY
Qty: 9 TABLET | Refills: 0 | Status: SHIPPED | OUTPATIENT
Start: 2024-02-09 | End: 2024-02-12

## 2024-02-08 RX ORDER — PANTOPRAZOLE SODIUM 40 MG/1
40 TABLET, DELAYED RELEASE ORAL
Qty: 30 TABLET | Refills: 3 | Status: SHIPPED | OUTPATIENT
Start: 2024-02-08

## 2024-02-08 RX ADMIN — SODIUM CHLORIDE, PRESERVATIVE FREE 40 MG: 5 INJECTION INTRAVENOUS at 09:50

## 2024-02-08 RX ADMIN — IPRATROPIUM BROMIDE 0.5 MG: 0.5 SOLUTION RESPIRATORY (INHALATION) at 07:45

## 2024-02-08 RX ADMIN — APIXABAN 5 MG: 5 TABLET, FILM COATED ORAL at 09:05

## 2024-02-08 RX ADMIN — SODIUM CHLORIDE, PRESERVATIVE FREE 10 ML: 5 INJECTION INTRAVENOUS at 09:05

## 2024-02-08 RX ADMIN — FOLIC ACID 5 MG: 1 TABLET ORAL at 09:05

## 2024-02-08 RX ADMIN — LEVALBUTEROL HYDROCHLORIDE 0.63 MG: 0.63 SOLUTION RESPIRATORY (INHALATION) at 07:45

## 2024-02-08 RX ADMIN — LEVALBUTEROL HYDROCHLORIDE 0.63 MG: 0.63 SOLUTION RESPIRATORY (INHALATION) at 15:34

## 2024-02-08 RX ADMIN — Medication: at 09:05

## 2024-02-08 RX ADMIN — THERA TABS 1 TABLET: TAB at 09:05

## 2024-02-08 RX ADMIN — BUDESONIDE 250 MCG: 0.25 INHALANT RESPIRATORY (INHALATION) at 07:46

## 2024-02-08 RX ADMIN — THIAMINE HYDROCHLORIDE 100 MG: 100 INJECTION, SOLUTION INTRAMUSCULAR; INTRAVENOUS at 09:05

## 2024-02-08 RX ADMIN — AMIODARONE HYDROCHLORIDE 200 MG: 200 TABLET ORAL at 09:04

## 2024-02-08 RX ADMIN — IPRATROPIUM BROMIDE 0.5 MG: 0.5 SOLUTION RESPIRATORY (INHALATION) at 15:34

## 2024-02-08 RX ADMIN — IPRATROPIUM BROMIDE 0.5 MG: 0.5 SOLUTION RESPIRATORY (INHALATION) at 11:38

## 2024-02-08 RX ADMIN — LEVALBUTEROL HYDROCHLORIDE 0.63 MG: 0.63 SOLUTION RESPIRATORY (INHALATION) at 11:38

## 2024-02-08 RX ADMIN — METOPROLOL TARTRATE 25 MG: 25 TABLET, FILM COATED ORAL at 09:05

## 2024-02-08 RX ADMIN — PREDNISONE 20 MG: 20 TABLET ORAL at 09:05

## 2024-02-08 ASSESSMENT — PAIN SCALES - PAIN ASSESSMENT IN ADVANCED DEMENTIA (PAINAD)
CONSOLABILITY: 0
CONSOLABILITY: 0
BREATHING: 0
TOTALSCORE: 1
TOTALSCORE: 0
FACIALEXPRESSION: 0
BODYLANGUAGE: 0
NEGVOCALIZATION: 0
CONSOLABILITY: 0
NEGVOCALIZATION: 0
NEGVOCALIZATION: 0
FACIALEXPRESSION: 0
TOTALSCORE: 1
BREATHING: 1
BREATHING: 1
BODYLANGUAGE: 0
BODYLANGUAGE: 0
FACIALEXPRESSION: 0

## 2024-02-08 ASSESSMENT — PAIN SCALES - WONG BAKER: WONGBAKER_NUMERICALRESPONSE: 0

## 2024-02-08 ASSESSMENT — PAIN SCALES - GENERAL: PAINLEVEL_OUTOF10: 0

## 2024-02-08 NOTE — PROGRESS NOTES
Speech Language Pathology  Facility/Department:[facility] PC[facility]  DYSPHAGIA Therapy Note    Name: Zuleima Mir  : 1940  MRN: [id number]                                                     Patient Diagnosis(es):   Patient Active Problem List    Diagnosis Date Noted    Acute hypoxic respiratory failure (HCC) 2024    Pulmonary nodules/lesions, multiple 2024    Encounter for palliative care 2024    Atrial fibrillation with RVR (HCC) 2024    Acute pulmonary embolism without acute cor pulmonale (HCC) 2024    Septicemia (HCC) 2024    Severe sepsis (HCC) 2024     Past Medical History:   Diagnosis Date    Allergic rhinitis     Emphysema (subcutaneous) (surgical) resulting from a procedure     Hyperlipidemia     Hypertension     IBS (irritable bowel syndrome)     Pancreatic cyst     Type II or unspecified type diabetes mellitus without mention of complication, not stated as uncontrolled     diet controlled    Wears hearing aid in both ears     Wears partial dentures     lower     Past Surgical History:   Procedure Laterality Date    BREAST SURGERY      biopsy    CHOLECYSTECTOMY      COLONOSCOPY      HYSTERECTOMY (CERVIX STATUS UNKNOWN)      UPPER GASTROINTESTINAL ENDOSCOPY N/A 2020    ESOPHAGOGASTRODUODENOSCOPY WITH ENDOSCOPIC ULTRASOUND AND MONITORED ANESTHESIA CARE performed by Fawad Cabrera MD at [facility] ENDOSCOPY    UPPER GASTROINTESTINAL ENDOSCOPY  2020    EGD BIOPSY performed by Fawad Cabrera MD at [facility] ENDOSCOPY     History of Present Illness  Per MD notes:  \"My assessment reveals an 83-year-old female lifelong smoker with significant past history of COPD not on oxygen, diabetes type 2, hypertension hyperlipidemia, on chronic benzos and opioids who was brought to the emergency room unresponsive, hypoxic and tachycardic in the 170s.  In emergency room she had a low-grade temperature of 37.8, initial blood pressure was 102/52, heart rate was 180

## 2024-02-08 NOTE — CARE COORDINATION
Case Management Assessment            Discharge Note                    Date / Time of Note: 2/8/2024 3:44 PM                  Discharge Note Completed by: Monica Weston    Patient Name: Zuleima Mir   YOB: 1940  Diagnosis: Septicemia (HCC) [A41.9]  Influenza A [J10.1]  Atrial fibrillation with RVR (HCC) [I48.91]  Acute kidney injury (HCC) [N17.9]  Severe sepsis (HCC) [A41.9, R65.20]  Pneumonia of both lower lobes due to infectious organism [J18.9]  Acute hypoxic respiratory failure (HCC) [J96.01]   Date / Time: 1/28/2024  2:29 AM    Current PCP: Van Neal patient: No    Hospitalization in the last 30 days: No       Advance Directives:  Code Status: Limited  [place] DNR form completed and on chart: requested signature from MD    Financial:  Payor: PRO MEDICARE / Plan: [employer] ESSENTIAL/PLUS / Product Type: *No Product type* /      Pharmacy:    [facility]/pharmacy #[id number] - [place], [place] - [address] - P [phone] - F [phone]  [address] [facility] [address]  Phone: [phone] Fax: [phone]      Assistance purchasing medications?: Potential Assistance Purchasing Medications: No  Assistance provided by Case Management: None at this time    Does patient want to participate in local refill/ meds to beds program?: No    Meds To Beds General Rules:  1. Can ONLY be done Monday- Friday between 8:30am-5pm  2. Prescription(s) must be in pharmacy by 3pm to be filled same day  3.Copy of patient's insurance/ prescription drug card and patient face sheet must be sent along with the prescription(s)  4. Cost of Rx cannot be added to hospital bill. If financial assistance is needed, please contact unit  or ;  or  CANNOT provide pharmacy voucher for patients co-pays  5. Patients can then  the prescription on their way out of the hospital at discharge, or pharmacy can deliver to the bedside if staff is

## 2024-02-08 NOTE — CARE COORDINATION
CM attempted to call daughter regarding DCP- CM spoke with her yesterday, stated she was planning to tour the two accepting facilities- [facility] and [facility]- this morning to make decision for pt about facility placement. CM reached mailbox which was full. Will try again this afternoon. Pt will need precert once facility has been chosen.    1258:  CM attempted to call daughter again regarding DCP- went to ,  box full, unable to leave message.    1425:  CM called dgtr- states she is agreeable to DC to [facility]. Chel RENDON, to start precert.    1520:  Precert approved for SNF admission to [facility]- Auth # [id number] . Spoke with Rusty at [facility]- states they can accept pt today after 1800.    Thank you  Cat Enrico RN, BSN, [facility]   [phone]

## 2024-02-08 NOTE — PLAN OF CARE
Problem: Discharge Planning  Goal: Discharge to home or other facility with appropriate resources  2/7/2024 2007 by Yoly Roy RN  Outcome: Progressing  2/7/2024 1456 by Lois Holder RN  Outcome: Progressing  Flowsheets (Taken 2/7/2024 1456)  Discharge to home or other facility with appropriate resources:   Identify barriers to discharge with patient and caregiver   Arrange for needed discharge resources and transportation as appropriate   Identify discharge learning needs (meds, wound care, etc)     Problem: Pain  Goal: Verbalizes/displays adequate comfort level or baseline comfort level  2/7/2024 2007 by Yoly Roy RN  Outcome: Progressing  2/7/2024 1456 by Lois Holder RN  Outcome: Progressing  Flowsheets (Taken 2/7/2024 1456)  Verbalizes/displays adequate comfort level or baseline comfort level:   Encourage patient to monitor pain and request assistance   Assess pain using appropriate pain scale   Administer analgesics based on type and severity of pain and evaluate response     Problem: Safety - Adult  Goal: Free from fall injury  2/7/2024 2007 by Yoly Roy RN  Outcome: Progressing  2/7/2024 1456 by Lois Holder RN  Outcome: Progressing  Flowsheets (Taken 2/7/2024 1456)  Free From Fall Injury: Instruct family/caregiver on patient safety     Problem: Neurosensory - Adult  Goal: Achieves stable or improved neurological status  2/7/2024 2007 by Yoly Roy RN  Outcome: Progressing  2/7/2024 1456 by Lois Holder RN  Outcome: Progressing  Flowsheets (Taken 2/7/2024 1456)  Achieves stable or improved neurological status: Assess for and report changes in neurological status  Goal: Absence of seizures  Outcome: Progressing  Goal: Remains free of injury related to seizures activity  Outcome: Progressing  Goal: Achieves maximal functionality and self care  Outcome: Progressing     Problem: Cardiovascular - Adult  Goal: Maintains optimal cardiac output and hemodynamic

## 2024-02-08 NOTE — DISCHARGE INSTR - COC
Continuity of Care Form    Patient Name: Zuleima Mir   :  1940  MRN:  [id number]    Admit date:  2024  Discharge date:  2024    Code Status Order: Limited   Advance Directives:     Admitting Physician:  Olivia Medina MD  PCP: Van Neal    [facility]4452-01  Discharging Unit Phone Number: [phone]    Emergency Contact:   Extended Emergency Contact Information  Primary Emergency Contact: cici kim  Mobile Phone: [phone]  Relation: Child  Preferred language: English   needed? No    Past Surgical History:  Past Surgical History:   Procedure Laterality Date    BREAST SURGERY      biopsy    CHOLECYSTECTOMY      COLONOSCOPY      HYSTERECTOMY (CERVIX STATUS UNKNOWN)      UPPER GASTROINTESTINAL ENDOSCOPY N/A 2020    ESOPHAGOGASTRODUODENOSCOPY WITH ENDOSCOPIC ULTRASOUND AND MONITORED ANESTHESIA CARE performed by Fawad Cabrera MD at [facility] ENDOSCOPY    UPPER GASTROINTESTINAL ENDOSCOPY  2020    EGD BIOPSY performed by Fawad Cabrera MD at [facility] ENDOSCOPY       Immunization History:     There is no immunization history on file for this patient.    Active Problems:  Patient Active Problem List   Diagnosis Code    Severe sepsis ([facility]) A41.9, R65.20    Atrial fibrillation with RVR ([facility]) I48.91    Acute pulmonary embolism without acute cor pulmonale ([facility]) I26.99    Septicemia ([facility]) A41.9    Encounter for palliative care Z51.5    Acute hypoxic respiratory failure ([facility]) J96.01    Pulmonary nodules/lesions, multiple R91.8       Isolation/Infection:   Isolation            No Isolation          Patient Infection Status       Infection Onset Added Last Indicated Last Indicated By Review Planned Expiration Resolved Resolved By    None active    Resolved    Influenza 24 COVID-19 & Influenza Combo   24 Tamela Gurrola, RN                       Nurse Assessment:  Last Vital Signs: BP (!)

## 2024-02-08 NOTE — PROGRESS NOTES
Physical Therapy  Facility/Department: [facility] PC[facility]  Daily Treatment Note  NAME: Zuleima Mir  : 1940  MRN: [id number]    Date of Service: 2024    Discharge Recommendations:  Subacute/Skilled Nursing Facility   PT Equipment Recommendations  Other: Defer    Patient Diagnosis(es): The primary encounter diagnosis was Septicemia (HCC). Diagnoses of Pneumonia of both lower lobes due to infectious organism, Acute kidney injury (HCC), Atrial fibrillation with RVR (HCC), Acute hypoxic respiratory failure (HCC), and Influenza A were also pertinent to this visit.    Pt hx: Patient is an 84 y/o female admitted  with sepsis that appears to be related to influenza A, potentially with a superimposed bacterial pneumonia, with hypoxic respiratory failure requiring 10 L high flow nasal cannula, acute kidney injury, and new onset atrial fibrillation with rapid ventricular response. chest x-ray (+) for 1. Multifocal bilateral airspace disease most consistent with pneumonia. PMH significant for HLD, HTN, diabetes, emphysema.     Assessment   Assessment: Pt tolerated session fair with minimal verbalization and intermitent ability to follow commands. Total assist x2 for bed mobility and Max  A for maintaing sitting balance EOB with minimal initiation and requesting to lie down after just a few min EOB. pt did participate in washing face at EOB with max VCs and inc time, unable to engage in UE or LE exericses. pt very limited by cognition and fatigue. Pt unsafe to return home in current state and would benefit from further inpt PT. Continue PT per POC.  Activity Tolerance: Treatment limited secondary to decreased cognition;Patient limited by fatigue;Patient limited by endurance  Other: Defer     Plan    Physical Therapy Plan  General Plan:  (2-5)  Current Treatment Recommendations: Strengthening;Balance training;Functional mobility training;Transfer training;Endurance training;Patient/Caregiver education & training;Safety

## 2024-02-08 NOTE — PROGRESS NOTES
Occupational Therapy  Facility/Department: [facility] PCU  Daily Treatment Note  NAME: Zuleima Mir  : 1940  MRN: [id number]    Date of Service: 2024    Discharge Recommendations:  Subacute/Skilled Nursing Facility  OT Equipment Recommendations  Other: defer to next care facility      Patient Diagnosis(es): The primary encounter diagnosis was Septicemia (HCC). Diagnoses of Pneumonia of both lower lobes due to infectious organism, Acute kidney injury (HCC), Atrial fibrillation with RVR (HCC), Acute hypoxic respiratory failure (HCC), and Influenza A were also pertinent to this visit.     Assessment    Assessment: Pt with minimal verbalization and intermitent ability to follow commands. Total assist x2 for bed mobility and Max  A for maintaing sitting balance EOB. Pt able to bring hand to face to wash face but then unable to grasp spoon and bring food to mouth. Pt would benefit from inpt OT. Continue OT per POC.        Activity Tolerance: Treatment limited secondary to decreased cognition;Patient limited by fatigue;Patient limited by endurance  Discharge Recommendations: Subacute/Skilled Nursing Facility  Other: defer to next care facility      Plan   Occupational Therapy Plan  Times Per Week: 2-5  Current Treatment Recommendations: Strengthening;Coordination training;Self-Care / ADL;Safety education & training;Functional mobility training;Patient/Caregiver education & training;Endurance training          Subjective   Subjective  Subjective: Pt supine in bed and mildly resistant to movement but able to sit up EOB. Pt pushing therapist away intermitently then pushing to lay back down. No pain noted.Pt mostly non verbal  Orientation  Overall Orientation Status: Impaired  Orientation Level: Oriented to person;Disoriented to place;Disoriented to situation;Disoriented to time  Cognition  Overall Cognitive Status: Exceptions  Arousal/Alertness: Inconsistent responses to stimuli  Following Commands: Inconsistently follows  commands  Attention Span: Difficulty attending to directions  Memory: Decreased short term memory;Decreased recall of recent events  Safety Judgement: Decreased awareness of need for safety  Problem Solving: Decreased awareness of errors  Insights: Not aware of deficits  Initiation: Requires cues for all  Sequencing: Requires cues for all  Cognition Comment: ? baseline cogntive level        Objective         Bed Mobility Training  Bed Mobility Training: Yes  Overall Level of Assistance: Maximum assistance;Assist X2;Total assistance  Rolling: Assist X2;Maximum assistance;Total assistance (rolling side to side for bedding managment)  Supine to Sit: Assist X2;Total assistance  Sit to Supine: Assist X2;Total assistance  Balance  Sitting:  (Pt requiring Max  A to maintaing sitting balance with posterior and L lateral pushing to lay back down)     ADL  Feeding: Dependent/Total  Feeding Skilled Clinical Factors: pt mostly resistant to taking bites but able to open mouth and take two bites of pudding and one bite of grits  Grooming: Dependent/Total;Stand by assistance  Grooming Skilled Clinical Factors: Pt requiring Max A to maintaing sitting balance during ADL tasks washing face and application of chap stick  LE Dressing: Dependent/Total  LE Dressing Skilled Clinical Factors: donning socks with heavy cues for lifing LE.  Toileting: Dependent/Total  Toileting Skilled Clinical Factors: pure wick in place  Functional Mobility: Unable to assess(comment)        Safety Devices  Type of Devices: Left in bed;Bed alarm in place;Call light within reach;Nurse notified  Restraints  Restraints Initially in Place: No     Patient Education  Education Given To: Patient;Family  Education Provided: Role of Therapy;Plan of Care;ADL Adaptive Strategies;IADL Safety  Education Method: Demonstration;Verbal  Barriers to Learning: Cognition  Education Outcome: Continued education needed    Goals  Short Term Goals  Time Frame for Short Term Goals:

## 2024-02-08 NOTE — PLAN OF CARE
Problem: Safety - Medical Restraint  Goal: Remains free of injury from restraints (Restraint for Interference with Medical Device)  Description: INTERVENTIONS:  1. Determine that other, less restrictive measures have been tried or would not be effective before applying the restraint  2. Evaluate the patient's condition at the time of restraint application  3. Inform patient/family regarding the reason for restraint  4. Q2H: Monitor safety, psychosocial status, comfort, nutrition and hydration  2/7/2024 1941 by Lois Holder, RN  Outcome: Completed  Flowsheets (Taken 2/7/2024 1941)  Remains free of injury from restraints (restraint for interference with medical device):   Determine that other, less restrictive measures have been tried or would not be effective before applying the restraint   Evaluate the patient's condition at the time of restraint application   Every 2 hours: Monitor safety, psychosocial status, comfort, nutrition and hydration   Inform patient/family regarding the reason for restraint  2/7/2024 1456 by Lois Holder, RN  Outcome: Progressing  Flowsheets (Taken 2/7/2024 1456)  Remains free of injury from restraints (restraint for interference with medical device):   Evaluate the patient's condition at the time of restraint application   Every 2 hours: Monitor safety, psychosocial status, comfort, nutrition and hydration   Inform patient/family regarding the reason for restraint   Determine that other, less restrictive measures have been tried or would not be effective before applying the restraint

## 2024-02-08 NOTE — PROGRESS NOTES
Progress Note    Admit Date: 1/28/2024  Day: 11  Diet: ADULT DIET; Dysphagia - Pureed; Mildly Thick (Nectar)    CC: AMS, fatigue    Interval history:   NAEO. VSS. Stable on 1 L O2. Mentation remains improved. Tolerated MBS yesterday with clearance for nectar-like diet. Cor-odell and tube feeds discontinued. Labs have remained stable, will decrease to once daily now given lower risk of refeeding syndrome.    Medications:     Scheduled Meds:   amiodarone  200 mg Oral Daily    predniSONE  20 mg Per NG tube Daily    Followed by    [START ON 2/9/2024] predniSONE  15 mg Per NG tube Daily    Followed by    [START ON 2/12/2024] predniSONE  10 mg Per NG tube Daily    Followed by    [START ON 2/15/2024] predniSONE  5 mg Per NG tube Daily    metoprolol tartrate  25 mg Oral BID    thiamine  100 mg IntraVENous Daily    levalbuterol  0.63 mg Nebulization 4x Daily RT    [Held by provider] metoprolol  5 mg IntraVENous Q6H    apixaban  5 mg Oral BID    multivitamin  1 tablet Oral Daily    folic acid  5 mg Oral Daily    balsum peru-castor oil   Topical BID    budesonide  0.25 mg Nebulization BID RT    sodium chloride flush  5-40 mL IntraVENous 2 times per day    ipratropium  0.5 mg Nebulization 4x Daily RT    pantoprazole (PROTONIX) 40 mg in sodium chloride (PF) 0.9 % 10 mL injection  40 mg IntraVENous BID     Continuous Infusions:   sodium chloride 25 mL (02/05/24 0444)     PRN Meds:hydrOXYzine HCl, sodium chloride flush, sodium chloride, ondansetron **OR** ondansetron, polyethylene glycol, acetaminophen **OR** acetaminophen, [Held by provider] ALPRAZolam, perflutren lipid microspheres    Objective:   Vitals:   T-max:  Patient Vitals for the past 8 hrs:   BP Temp Temp src Pulse Resp SpO2 Weight   02/08/24 0745 -- -- -- 77 26 95 % --   02/08/24 0357 (!) 127/55 98.3 °F (36.8 °C) Bladder 77 22 97 % --   02/08/24 0155 -- -- -- -- -- -- 43.8 kg (96 lb 8 oz)         Intake/Output Summary (Last 24 hours) at 2/8/2024 0747  Last data filed at    Feeding tube is evident. Distal aspect of the catheter is coiled at   the gastroesophageal junction, and the tip appears to be in the distal   esophagus. Recommend repositioning.      XR ABDOMEN (KUB) (SINGLE AP VIEW)   Final Result   Tip of the feeding tube overlies the left upper abdomen.      XR CHEST PORTABLE   Final Result   Stable appearance of the chest with multifocal bilateral pulmonary   opacities.      XR CHEST PORTABLE   Final Result   1. Multifocal bilateral airspace disease most consistent with pneumonia.      XR CHEST PORTABLE   Final Result   1.  No significant change from prior.      VL Extremity Venous Bilateral   Final Result      XR CHEST PORTABLE   Final Result   1. Multifocal airspace disease with mid and lower lung field predominance,   worsening compared to prior study.      CT CHEST PULMONARY EMBOLISM W CONTRAST   Final Result   Addendum (preliminary) 1 of 1   Addendum:    There are nonocclusive filling defects within the lateral and posterior    segments   of the right lower lobe pulmonary artery, consistent with acute pulmonary   emboli. The RV/LV ratio measures approximately 1.1.            The study was initially interpreted by Dr Dean.  Addendum by Dr. Brady.      Final      1. No evidence of any pulmonary thromboembolus, aneurysm or dissection.       2. Mucous plugging and bronchial thickening and bronchiectasis noted most severe in the lower lobes, left greater than right but also visualized in the right inferior upper lobe      Electronically signed by Luis Dean, DO      CT ABDOMEN PELVIS W WO CONTRAST Additional Contrast? None   Final Result      1.  Motion degraded exam.   2.  No definite evidence of active GI bleeding.   3.  Extensive atherosclerosis of the abdominal aorta with infrarenal abdominal aortic aneurysm measuring up to 4.3 cm.   4.  Prior cholecystectomy. Dilated intra and extrahepatic bile ducts and prominent pancreatic duct. Correlate with enzyme

## 2024-02-09 NOTE — DISCHARGE SUMMARY
INTERNAL MEDICINE DEPARTMENT AT THE [facility]  DISCHARGE SUMMARY    Patient ID: Zuleima Mir                                             Discharge Date: 2/8/2024   Patient's PCP: Van Neal                                          Discharge Physician: Richard Tamayo MD  Admit Date: 1/28/2024   Admitting Physician: Olivia Medina MD    PROBLEMS DURING HOSPITALIZATION:  Present on Admission:   Severe sepsis (HCC)   Atrial fibrillation with RVR (HCC)   Acute pulmonary embolism without acute cor pulmonale (HCC)   Septicemia (HCC)   Encounter for palliative care   Acute hypoxic respiratory failure (HCC)   Pulmonary nodules/lesions, multiple      DISCHARGE DIAGNOSES:  AMS 2/2 to pneumonia  Influenza A  Severe sepsis with organ dysfunction  Subsegmental pulmonary embolism  New onset afib with RVR  Dark stools several occurrences  Incidentally found dilated bile ducts/prominent pancreatic duct  NYA, prerenal     HPI:  Zuleima Mir is a 83 y.o. female, presented with AMS. Patient has a PMHx of   IBS, HTN, HLD, allergic rhinitis, DM2.  Patient is too altered to converse however her daughter was in the room to provide information.  She mentions that over the past few weeks her mother started becoming more fatigued, weak, altered, had been eating less, and developed a productive cough.  She is unsure if it is a thick or thin sputum or its color.  She said that she had become so weak that her legs gave out and she was not able to walk.  She wanted to bring her to the hospital sooner however her mother denied.     At the ED, , /52.  She was found to have atrial fibrillation on monitor and her daughter denies her ever having atrial fibrillation nor it is not seen on her chart.  She was started on a Dilt gtt which improved her heart rate to ~140-160s and was given a bolus of 1.3L of LR which improved her BP as well.  Her labs were pertinent for creatinine of 2.0 which is elevated from her baseline of around  release tablet  Commonly known as: OXY-IR            STOP taking these medications      FLUoxetine 20 MG capsule  Commonly known as: PROZAC               Where to Get Your Medications        These medications were sent to [facility]/pharmacy #[id number] - [place], [place] - [address] - P [phone] - F [phone] [address], [facility] [address]      Phone: [phone]   amiodarone 200 MG tablet  apixaban 5 MG Tabs tablet  folic acid 1 MG tablet  hydrOXYzine HCl 10 MG tablet  metoprolol tartrate 25 MG tablet  multivitamin Tabs tablet  ondansetron 4 MG disintegrating tablet  pantoprazole 40 MG tablet  polyethylene glycol 17 g packet  predniSONE 10 MG tablet  predniSONE 5 MG tablet  predniSONE 5 MG tablet          Activity: activity as tolerated  Diet: Puree/nectar thick liquids  Wound Care: none needed    Time Spent on discharge is more than 30 minutes    Signed:  Richard Tamayo MD, PGY-1  2/8/2024

## 2024-02-09 NOTE — PROGRESS NOTES
Transport arrived to take pt to [facility]. IV removed, disconnected from telemetry, pt cleaned and venelex applied to left thigh wound. Belongings gathered and pt discharged.

## 2025-04-15 NOTE — CONSULTS
Nutrition Assessment for Enteral Feeding    RECOMMENDATION:    Nutrition Support:  Patient at HIGH RISK of Refeeding. Start daily MV + 100 mg Thiamin + 5mg Folate for 5-7 days (start 2/3 - end 2/10).   EN Day 1: Start Standard Formula with Fiber  Jevity 1.5 @ 10ml/hr x 24 hours  EN Day 2: If no signs of refeeding, advance by 10 ml every 8 hours until goal rate of 40ml/hr.   Water flush of 100ml every 4 hours.   Daily Labs: Phos, K+ and Mg to monitor for signs of refeeding.   Modular: ProSource once daily  Flush w/ 30ml before and after administration  Do not mix w/ TF formula  2.  PO diet: Continue FLD per SLP  3.  ONS: Decrease ONS from TID to BID    NUTRITION ASSESSMENT:   Nutritional summary & status: Consult for TF ordering and mgmt.  NG access obtained.  High risk RFS.  See order above.   Admission/PMH: Pneumonia/Sepsis // IBS, HTN, HLD, DM2, Afib    MALNUTRITION ASSESSMENT  Acute Illness  Malnutrition Status: Severe malnutrition  Findings of the 6 clinical characteristics of malnutrition:  Energy Intake:  Mild decrease in energy intake (Comment)  Weight Loss:  No significant weight loss     Body Fat Loss:  Moderate body fat loss Orbital, Triceps, Fat Overlying Ribs   Muscle Mass Loss:  Moderate muscle mass loss Temples (temporalis)  Fluid Accumulation:  No significant fluid accumulation     Strength:  Not Performed    CURRENT TUBE FEEDING (TF) ORDERS:  Goal TF & Flush Orders Provides: Jevity 1.5 at 40 ml/hr with one ProSource daily will provied 960 ml TV, 1540 kcal, 81 gm protein and 1034 ml free water. Recommend 100 ml free water flush Q4 hrs.      COMPARATIVE STANDARDS  Energy (kcal):  0086-0102 (30-35)     Protein (g):  78-87 (1.8-2)       Fluid (mL/day):  1500 ml    Nilda Jo, MS, RD, LD  [place]ato:  [phone]  Office:  [phone]       none

## (undated) DEVICE — SINGLE-USE BIOPSY FORCEPS: Brand: RADIAL JAW 4

## (undated) DEVICE — BITE BLK 60FR GRN ENDOSCP AD W STRP SLD DISPOSABLE

## (undated) DEVICE — ENDOSCOPY KIT: Brand: MEDLINE INDUSTRIES, INC.

## (undated) DEVICE — BITE BLOCK ENDOSCP AD 60 FR W/ ADJ STRP PLAS GRN BLOX